# Patient Record
Sex: MALE | Race: WHITE | NOT HISPANIC OR LATINO | Employment: FULL TIME | URBAN - METROPOLITAN AREA
[De-identification: names, ages, dates, MRNs, and addresses within clinical notes are randomized per-mention and may not be internally consistent; named-entity substitution may affect disease eponyms.]

---

## 2020-12-22 ENCOUNTER — HOSPITAL ENCOUNTER (EMERGENCY)
Facility: OTHER | Age: 35
Discharge: HOME | End: 2020-12-22
Attending: EMERGENCY MEDICINE
Payer: MEDICAID

## 2020-12-22 VITALS
SYSTOLIC BLOOD PRESSURE: 112 MMHG | HEIGHT: 71 IN | RESPIRATION RATE: 16 BRPM | WEIGHT: 185 LBS | DIASTOLIC BLOOD PRESSURE: 77 MMHG | HEART RATE: 109 BPM | TEMPERATURE: 98 F | BODY MASS INDEX: 25.9 KG/M2 | OXYGEN SATURATION: 96 %

## 2020-12-22 DIAGNOSIS — F10.920 ALCOHOLIC INTOXICATION WITHOUT COMPLICATION: Primary | ICD-10-CM

## 2020-12-22 LAB — GLUCOSE SERPL-MCNC: 206 MG/DL (ref 70–110)

## 2020-12-22 PROCEDURE — 99282 EMERGENCY DEPT VISIT SF MDM: CPT

## 2020-12-22 PROCEDURE — 82962 GLUCOSE BLOOD TEST: CPT

## 2020-12-23 LAB — POCT GLUCOSE: 206 MG/DL (ref 70–110)

## 2020-12-23 NOTE — ED PROVIDER NOTES
"I, Daisy Marshall, scribed for, and in the presence of, Misty La MD. I performed the scribed service and the documentation accurately describes the services I performed. I attest to the accuracy of the note.     CHIEF COMPLAINT  Chief Complaint   Patient presents with    Alcohol Intoxication     pt came to the ed tonight s/p trying to break into the wrong hotel. pt admits to ETOH tonight. pt chose hospital over group home. no complaints       HPI  Sampson Cook is a 35 y.o. male who presents due to alcohol intoxication. Patient admits to drinking "a lot" of bourbon tonight. States [facility] brought him in because he was doing "stupid stuff." Patient reports taking daily medications, but states he does not remember the names of them. He admits to smoking cigarettes, denies use of illicit drugs. He denies any known allergies. He denies any complaints at this time. This is the extent of the patient's complaints at this time.       PAST MEDICAL HISTORY  No past medical history on file.    CURRENT MEDICATIONS    No current facility-administered medications for this encounter.   No current outpatient medications on file.    ALLERGIES    Review of patient's allergies indicates:  Not on File    SURGICAL HISTORY    No past surgical history on file.    SOCIAL HISTORY    Social History     Socioeconomic History    Marital status: Single     Spouse name: Not on file    Number of children: Not on file    Years of education: Not on file    Highest education level: Not on file   Occupational History    Not on file   Social Needs    Financial resource strain: Not on file    Food insecurity     Worry: Not on file     Inability: Not on file    Transportation needs     Medical: Not on file     Non-medical: Not on file   Tobacco Use    Smoking status: Not on file   Substance and Sexual Activity    Alcohol use: Not on file    Drug use: Not on file    Sexual activity: Not on file   Lifestyle    Physical activity     Days per " "week: Not on file     Minutes per session: Not on file    Stress: Not on file   Relationships    Social connections     Talks on phone: Not on file     Gets together: Not on file     Attends Muslim service: Not on file     Active member of club or organization: Not on file     Attends meetings of clubs or organizations: Not on file     Relationship status: Not on file   Other Topics Concern    Not on file   Social History Narrative    Not on file       FAMILY HISTORY    No family history on file.    REVIEW OF SYSTEMS   Constitutional:  No fever or weakness.   Eyes:  No redness, pain, or discharge.   HENT:  No ear pain, no sudden onset headache, no throat pain.  Respiratory:  No wheezing, cough, or shortness of breath.   Cardiovascular:  No chest pain or palpitations.  GI:  No abdominal pain, nausea, vomiting, or diarrhea.   : No dysuria or discharge.  Musculoskeletal:  No injury; full range of motion.   Skin:  No rash, abscess, or laceration.  Psychiatric: No suicidal ideations, homicidal ideations, auditory or visual hallucinations  Neurologic:  No focal weakness or sensory changes.   All Systems otherwise negative except as noted in the Review of Systems and History of Present Illness.    PHYSICAL EXAM    VITAL SIGNS: /77   Pulse 109   Temp 98 °F (36.7 °C)   Resp 16   Ht 5' 11" (1.803 m)   Wt 83.9 kg (185 lb)   SpO2 96%   BMI 25.80 kg/m²    Constitutional:  No acute distress.  Well developed, well nourished, alert & oriented x 3, non-toxic appearance. Appears clinically intoxication. Slurred speech.   HENT:  Normocephalic, atraumatic.  Normal ears, nose, and throat. Dry mucousa.  Eyes:  PERRL, EOMI, conjunctiva normal.  Neck: Normal range of motion, no tenderness, supple.  Respiratory:  Nonlabored breathing with normal breath sounds; no respiratory distress.  Cardiovascular: Tachycardic rate, regular rhythm.  GI:  Soft, nontender, no rebound or guarding.  Musculoskeletal: Normal ROM, no " tenderness, injury, edema.  Integument:  Warm, dry skin without infection. Bruises on bilateral legs that appear to be old. No deformity noted. Legs warm and dry.  Neurologic:  Normal motor, sensation with no focal deficit.  Psychiatric:  Affect normal, Judgment normal, Mood normal. No SI, HI and not gravely disabled.    LABS  Pertinent labs reviewed. (See chart for details)   Labs Reviewed   POCT GLUCOSE MONITORING CONTINUOUS         EKG    No results found for this or any previous visit.      RADIOLOGY    No orders to display       PROCEDURES    Procedures    Medications - No data to display    ED COURSE & MEDICAL DECISION MAKING      Pertinent & Imaging studies reviewed. (See chart for details)    Differential Diagnosis: Intoxication without evidence of trauma. Appears dry, otherwise well appearing. Will continue IV fluids, observe until clinically sober and then discharge.      ED Course as of Dec 22 2205   Tue Dec 22, 2020   2049 Patient asking for food, appears intoxicated, but appears sober enough to be eating. Giving IV fluids now and will PO challenge    [MG]      ED Course User Index  [MG] Misty La MD         There are no discharge medications for this patient.      There are no discharge medications for this patient.        OVERALL IMPRESSION:     34 yo M who was brought to the ER instead of to long-term, for intoxication and thought to be breaking into a hotel.  He has no signs of trauma he was observed if clinically in given fluids for being dry.  He was tolerating p.o. up and walking, and was discharged in stable condition    FINAL DIAGNOSIS  1. Alcoholic intoxication without complication        DISPOSITION  Patient discharged in stable condition.     I discussed with patient and/or family/caretaker that this evaluation in the ED does not suggest any emergent or life threatening condition medical condition requiring admission or immediate intervention beyond what was provided in the ED.   Regardless, an unremarkable evaluation in the ED does not preclude the development or presence of a serious or life threatening condition. As such, patient was instructed to return immediately for any worsening or change in current symptoms.       Critical care time spent with this patient (not including billable procedures) was 0 minutes.    Misty La MD  Emergency Medicine  [facility]  12/22/2020 7:57 PM    I have reviewed the notes, assessments, and/or procedures performed by Daisy vázquez and agree with documentation of this patient    Please pardon typos or dictation errors, as this note was transcribed using [employer] direct dictation software.           Misty La MD  12/22/20 1029

## 2020-12-23 NOTE — ED NOTES
Pt presents to the ED via ems for intoxication. Pt admits to drinking alcohol tonight, and then tried to break in to a hotel, pt chose to come to the hospital rather than go to residential. Pt currently having a conversation with himself. GER

## 2021-12-08 ENCOUNTER — HOSPITAL ENCOUNTER (INPATIENT)
Facility: HOSPITAL | Age: 36
LOS: 4 days | Discharge: LEFT BEFORE DISCHARGE | DRG: 894 | End: 2021-12-12
Attending: EMERGENCY MEDICINE | Admitting: STUDENT IN AN ORGANIZED HEALTH CARE EDUCATION/TRAINING PROGRAM
Payer: MEDICAID

## 2021-12-08 DIAGNOSIS — Z91.199 MEDICAL NON-COMPLIANCE: ICD-10-CM

## 2021-12-08 DIAGNOSIS — F10.939 ALCOHOL WITHDRAWAL SYNDROME WITH COMPLICATION: ICD-10-CM

## 2021-12-08 DIAGNOSIS — R74.8 ELEVATED CPK: ICD-10-CM

## 2021-12-08 DIAGNOSIS — R07.9 CHEST PAIN AT REST: ICD-10-CM

## 2021-12-08 DIAGNOSIS — Z72.0 TOBACCO USE: ICD-10-CM

## 2021-12-08 DIAGNOSIS — R00.0 TACHYCARDIA: ICD-10-CM

## 2021-12-08 DIAGNOSIS — F10.20 ALCOHOL USE DISORDER, SEVERE, DEPENDENCE: Chronic | ICD-10-CM

## 2021-12-08 DIAGNOSIS — R07.9 CHEST PAIN: ICD-10-CM

## 2021-12-08 DIAGNOSIS — F10.939: ICD-10-CM

## 2021-12-08 DIAGNOSIS — F10.229 ALCOHOL DEPENDENCE WITH INTOXICATION WITH COMPLICATION: Primary | ICD-10-CM

## 2021-12-08 DIAGNOSIS — R07.9 CHEST PAIN, RULE OUT ACUTE MYOCARDIAL INFARCTION: ICD-10-CM

## 2021-12-08 DIAGNOSIS — E11.65 TYPE 2 DIABETES MELLITUS WITH HYPERGLYCEMIA, WITHOUT LONG-TERM CURRENT USE OF INSULIN: ICD-10-CM

## 2021-12-08 DIAGNOSIS — R79.89 TROPONIN I ABOVE REFERENCE RANGE: ICD-10-CM

## 2021-12-08 LAB
ALBUMIN SERPL BCP-MCNC: 3.7 G/DL (ref 3.5–5.2)
ALP SERPL-CCNC: 53 U/L (ref 55–135)
ALT SERPL W/O P-5'-P-CCNC: 31 U/L (ref 10–44)
AMPHET+METHAMPHET UR QL: NEGATIVE
ANION GAP SERPL CALC-SCNC: 20 MMOL/L (ref 8–16)
APAP SERPL-MCNC: <3 UG/ML (ref 10–20)
AST SERPL-CCNC: 42 U/L (ref 10–40)
B-OH-BUTYR BLD STRIP-SCNC: 0.5 MMOL/L (ref 0–0.5)
BACTERIA #/AREA URNS AUTO: NORMAL /HPF
BARBITURATES UR QL SCN>200 NG/ML: NEGATIVE
BASOPHILS # BLD AUTO: 0.13 K/UL (ref 0–0.2)
BASOPHILS NFR BLD: 2.5 % (ref 0–1.9)
BENZODIAZ UR QL SCN>200 NG/ML: NEGATIVE
BILIRUB SERPL-MCNC: 0.4 MG/DL (ref 0.1–1)
BILIRUB UR QL STRIP: NEGATIVE
BUN SERPL-MCNC: 14 MG/DL (ref 6–20)
BUN SERPL-MCNC: 15 MG/DL (ref 6–30)
BZE UR QL SCN: NEGATIVE
CALCIUM SERPL-MCNC: 9.1 MG/DL (ref 8.7–10.5)
CANNABINOIDS UR QL SCN: NEGATIVE
CHLORIDE SERPL-SCNC: 100 MMOL/L (ref 95–110)
CHLORIDE SERPL-SCNC: 104 MMOL/L (ref 95–110)
CK SERPL-CCNC: 391 U/L (ref 20–200)
CLARITY UR REFRACT.AUTO: CLEAR
CO2 SERPL-SCNC: 17 MMOL/L (ref 23–29)
COLOR UR AUTO: YELLOW
CREAT SERPL-MCNC: 0.9 MG/DL (ref 0.5–1.4)
CREAT SERPL-MCNC: 1.1 MG/DL (ref 0.5–1.4)
CREAT UR-MCNC: 93 MG/DL (ref 23–375)
CTP QC/QA: YES
DIFFERENTIAL METHOD: ABNORMAL
EOSINOPHIL # BLD AUTO: 0.1 K/UL (ref 0–0.5)
EOSINOPHIL NFR BLD: 2.1 % (ref 0–8)
ERYTHROCYTE [DISTWIDTH] IN BLOOD BY AUTOMATED COUNT: 13 % (ref 11.5–14.5)
EST. GFR  (AFRICAN AMERICAN): >60 ML/MIN/1.73 M^2
EST. GFR  (NON AFRICAN AMERICAN): >60 ML/MIN/1.73 M^2
ESTIMATED AVG GLUCOSE: 143 MG/DL (ref 68–131)
ETHANOL SERPL-MCNC: 303 MG/DL
GLUCOSE SERPL-MCNC: 277 MG/DL (ref 70–110)
GLUCOSE SERPL-MCNC: 278 MG/DL (ref 70–110)
GLUCOSE UR QL STRIP: ABNORMAL
HBA1C MFR BLD: 6.6 % (ref 4–5.6)
HCT VFR BLD AUTO: 47.6 % (ref 40–54)
HCT VFR BLD CALC: 50 %PCV (ref 36–54)
HGB BLD-MCNC: 16 G/DL (ref 14–18)
HGB UR QL STRIP: ABNORMAL
IMM GRANULOCYTES # BLD AUTO: 0.01 K/UL (ref 0–0.04)
IMM GRANULOCYTES NFR BLD AUTO: 0.2 % (ref 0–0.5)
KETONES UR QL STRIP: ABNORMAL
LEUKOCYTE ESTERASE UR QL STRIP: NEGATIVE
LIPASE SERPL-CCNC: 59 U/L (ref 4–60)
LYMPHOCYTES # BLD AUTO: 1.8 K/UL (ref 1–4.8)
LYMPHOCYTES NFR BLD: 35 % (ref 18–48)
MAGNESIUM SERPL-MCNC: 1.9 MG/DL (ref 1.6–2.6)
MCH RBC QN AUTO: 29.7 PG (ref 27–31)
MCHC RBC AUTO-ENTMCNC: 33.6 G/DL (ref 32–36)
MCV RBC AUTO: 89 FL (ref 82–98)
METHADONE UR QL SCN>300 NG/ML: NEGATIVE
MICROSCOPIC COMMENT: NORMAL
MONOCYTES # BLD AUTO: 0.4 K/UL (ref 0.3–1)
MONOCYTES NFR BLD: 7.6 % (ref 4–15)
NEUTROPHILS # BLD AUTO: 2.8 K/UL (ref 1.8–7.7)
NEUTROPHILS NFR BLD: 52.6 % (ref 38–73)
NITRITE UR QL STRIP: NEGATIVE
NRBC BLD-RTO: 0 /100 WBC
OPIATES UR QL SCN: NEGATIVE
PCP UR QL SCN>25 NG/ML: NEGATIVE
PH UR STRIP: 5 [PH] (ref 5–8)
PLATELET # BLD AUTO: 347 K/UL (ref 150–450)
PMV BLD AUTO: 9.9 FL (ref 9.2–12.9)
POC IONIZED CALCIUM: 0.98 MMOL/L (ref 1.06–1.42)
POC TCO2 (MEASURED): 22 MMOL/L (ref 23–29)
POTASSIUM BLD-SCNC: 4 MMOL/L (ref 3.5–5.1)
POTASSIUM SERPL-SCNC: 4.1 MMOL/L (ref 3.5–5.1)
PROT SERPL-MCNC: 7.2 G/DL (ref 6–8.4)
PROT UR QL STRIP: NEGATIVE
RBC # BLD AUTO: 5.38 M/UL (ref 4.6–6.2)
RBC #/AREA URNS AUTO: 2 /HPF (ref 0–4)
SAMPLE: ABNORMAL
SARS-COV-2 RDRP RESP QL NAA+PROBE: NEGATIVE
SODIUM BLD-SCNC: 139 MMOL/L (ref 136–145)
SODIUM SERPL-SCNC: 137 MMOL/L (ref 136–145)
SP GR UR STRIP: 1.02 (ref 1–1.03)
SQUAMOUS #/AREA URNS AUTO: 2 /HPF
TOXICOLOGY INFORMATION: NORMAL
TSH SERPL DL<=0.005 MIU/L-ACNC: 0.47 UIU/ML (ref 0.4–4)
URN SPEC COLLECT METH UR: ABNORMAL
WBC # BLD AUTO: 5.23 K/UL (ref 3.9–12.7)
WBC #/AREA URNS AUTO: 1 /HPF (ref 0–5)
YEAST UR QL AUTO: NORMAL

## 2021-12-08 PROCEDURE — 82550 ASSAY OF CK (CPK): CPT | Performed by: PHYSICIAN ASSISTANT

## 2021-12-08 PROCEDURE — 80053 COMPREHEN METABOLIC PANEL: CPT | Performed by: PHYSICIAN ASSISTANT

## 2021-12-08 PROCEDURE — 25000003 PHARM REV CODE 250: Performed by: PHYSICIAN ASSISTANT

## 2021-12-08 PROCEDURE — 93005 ELECTROCARDIOGRAM TRACING: CPT

## 2021-12-08 PROCEDURE — G0378 HOSPITAL OBSERVATION PER HR: HCPCS

## 2021-12-08 PROCEDURE — 99285 EMERGENCY DEPT VISIT HI MDM: CPT | Mod: 25

## 2021-12-08 PROCEDURE — 80143 DRUG ASSAY ACETAMINOPHEN: CPT | Performed by: PHYSICIAN ASSISTANT

## 2021-12-08 PROCEDURE — 93010 EKG 12-LEAD: ICD-10-PCS | Mod: ,,, | Performed by: INTERNAL MEDICINE

## 2021-12-08 PROCEDURE — 86803 HEPATITIS C AB TEST: CPT | Performed by: EMERGENCY MEDICINE

## 2021-12-08 PROCEDURE — 99285 EMERGENCY DEPT VISIT HI MDM: CPT | Mod: 25,CS,, | Performed by: PHYSICIAN ASSISTANT

## 2021-12-08 PROCEDURE — 99406 PR TOBACCO USE CESSATION INTERMEDIATE 3-10 MINUTES: ICD-10-PCS | Mod: ,,, | Performed by: PHYSICIAN ASSISTANT

## 2021-12-08 PROCEDURE — 87389 HIV-1 AG W/HIV-1&-2 AB AG IA: CPT | Performed by: EMERGENCY MEDICINE

## 2021-12-08 PROCEDURE — 12000002 HC ACUTE/MED SURGE SEMI-PRIVATE ROOM

## 2021-12-08 PROCEDURE — 99220 PR INITIAL OBSERVATION CARE,LEVL III: CPT | Mod: ,,, | Performed by: PHYSICIAN ASSISTANT

## 2021-12-08 PROCEDURE — U0002 COVID-19 LAB TEST NON-CDC: HCPCS | Performed by: PHYSICIAN ASSISTANT

## 2021-12-08 PROCEDURE — 83690 ASSAY OF LIPASE: CPT | Performed by: PHYSICIAN ASSISTANT

## 2021-12-08 PROCEDURE — 93010 ELECTROCARDIOGRAM REPORT: CPT | Mod: ,,, | Performed by: INTERNAL MEDICINE

## 2021-12-08 PROCEDURE — 99285 PR EMERGENCY DEPT VISIT,LEVEL V: ICD-10-PCS | Mod: 25,CS,, | Performed by: PHYSICIAN ASSISTANT

## 2021-12-08 PROCEDURE — 96360 HYDRATION IV INFUSION INIT: CPT

## 2021-12-08 PROCEDURE — S4991 NICOTINE PATCH NONLEGEND: HCPCS | Performed by: PHYSICIAN ASSISTANT

## 2021-12-08 PROCEDURE — 84443 ASSAY THYROID STIM HORMONE: CPT | Performed by: PHYSICIAN ASSISTANT

## 2021-12-08 PROCEDURE — 63600175 PHARM REV CODE 636 W HCPCS: Performed by: PHYSICIAN ASSISTANT

## 2021-12-08 PROCEDURE — 80307 DRUG TEST PRSMV CHEM ANLYZR: CPT | Performed by: PHYSICIAN ASSISTANT

## 2021-12-08 PROCEDURE — 81001 URINALYSIS AUTO W/SCOPE: CPT | Performed by: PHYSICIAN ASSISTANT

## 2021-12-08 PROCEDURE — 99220 PR INITIAL OBSERVATION CARE,LEVL III: ICD-10-PCS | Mod: ,,, | Performed by: PHYSICIAN ASSISTANT

## 2021-12-08 PROCEDURE — 82010 KETONE BODYS QUAN: CPT | Performed by: PHYSICIAN ASSISTANT

## 2021-12-08 PROCEDURE — 82077 ASSAY SPEC XCP UR&BREATH IA: CPT | Performed by: PHYSICIAN ASSISTANT

## 2021-12-08 PROCEDURE — 83036 HEMOGLOBIN GLYCOSYLATED A1C: CPT | Performed by: PHYSICIAN ASSISTANT

## 2021-12-08 PROCEDURE — 80047 BASIC METABLC PNL IONIZED CA: CPT

## 2021-12-08 PROCEDURE — 99406 BEHAV CHNG SMOKING 3-10 MIN: CPT | Mod: ,,, | Performed by: PHYSICIAN ASSISTANT

## 2021-12-08 PROCEDURE — 83735 ASSAY OF MAGNESIUM: CPT | Performed by: PHYSICIAN ASSISTANT

## 2021-12-08 PROCEDURE — 85025 COMPLETE CBC W/AUTO DIFF WBC: CPT | Performed by: PHYSICIAN ASSISTANT

## 2021-12-08 RX ORDER — IBUPROFEN 200 MG
1 TABLET ORAL
Status: DISCONTINUED | OUTPATIENT
Start: 2021-12-08 | End: 2021-12-08

## 2021-12-08 RX ORDER — IPRATROPIUM BROMIDE AND ALBUTEROL SULFATE 2.5; .5 MG/3ML; MG/3ML
3 SOLUTION RESPIRATORY (INHALATION) EVERY 4 HOURS PRN
Status: DISCONTINUED | OUTPATIENT
Start: 2021-12-08 | End: 2021-12-12 | Stop reason: HOSPADM

## 2021-12-08 RX ORDER — IBUPROFEN 200 MG
16 TABLET ORAL
Status: DISCONTINUED | OUTPATIENT
Start: 2021-12-08 | End: 2021-12-12 | Stop reason: HOSPADM

## 2021-12-08 RX ORDER — PROMETHAZINE HYDROCHLORIDE 25 MG/1
25 TABLET ORAL EVERY 6 HOURS PRN
Status: DISCONTINUED | OUTPATIENT
Start: 2021-12-08 | End: 2021-12-12 | Stop reason: HOSPADM

## 2021-12-08 RX ORDER — NITROGLYCERIN 0.4 MG/1
0.4 TABLET SUBLINGUAL EVERY 5 MIN PRN
Status: DISCONTINUED | OUTPATIENT
Start: 2021-12-09 | End: 2021-12-12 | Stop reason: HOSPADM

## 2021-12-08 RX ORDER — FOLIC ACID 1 MG/1
1000 TABLET ORAL DAILY
COMMUNITY
Start: 2021-10-30

## 2021-12-08 RX ORDER — B-COMPLEX WITH VITAMIN C
1 TABLET ORAL
Status: COMPLETED | OUTPATIENT
Start: 2021-12-08 | End: 2021-12-08

## 2021-12-08 RX ORDER — IBUPROFEN 200 MG
1 TABLET ORAL DAILY PRN
Status: DISCONTINUED | OUTPATIENT
Start: 2021-12-08 | End: 2021-12-08

## 2021-12-08 RX ORDER — METFORMIN HYDROCHLORIDE 500 MG/1
1000 TABLET ORAL 2 TIMES DAILY
COMMUNITY

## 2021-12-08 RX ORDER — ONDANSETRON 4 MG/1
4 TABLET, ORALLY DISINTEGRATING ORAL EVERY 6 HOURS PRN
Status: DISCONTINUED | OUTPATIENT
Start: 2021-12-08 | End: 2021-12-12 | Stop reason: HOSPADM

## 2021-12-08 RX ORDER — FOLIC ACID 1 MG/1
1 TABLET ORAL DAILY
Status: DISCONTINUED | OUTPATIENT
Start: 2021-12-09 | End: 2021-12-12 | Stop reason: HOSPADM

## 2021-12-08 RX ORDER — BISACODYL 10 MG
10 SUPPOSITORY, RECTAL RECTAL DAILY PRN
Status: DISCONTINUED | OUTPATIENT
Start: 2021-12-08 | End: 2021-12-12 | Stop reason: HOSPADM

## 2021-12-08 RX ORDER — IBUPROFEN 200 MG
24 TABLET ORAL
Status: DISCONTINUED | OUTPATIENT
Start: 2021-12-08 | End: 2021-12-12 | Stop reason: HOSPADM

## 2021-12-08 RX ORDER — ESCITALOPRAM OXALATE 10 MG/1
10 TABLET ORAL DAILY
COMMUNITY
Start: 2021-07-29 | End: 2022-02-04

## 2021-12-08 RX ORDER — INSULIN ASPART 100 [IU]/ML
0-5 INJECTION, SOLUTION INTRAVENOUS; SUBCUTANEOUS
Status: DISCONTINUED | OUTPATIENT
Start: 2021-12-08 | End: 2021-12-12 | Stop reason: HOSPADM

## 2021-12-08 RX ORDER — POLYETHYLENE GLYCOL 3350 17 G/17G
17 POWDER, FOR SOLUTION ORAL DAILY PRN
Status: DISCONTINUED | OUTPATIENT
Start: 2021-12-08 | End: 2021-12-12 | Stop reason: HOSPADM

## 2021-12-08 RX ORDER — ACETAMINOPHEN 325 MG/1
650 TABLET ORAL EVERY 4 HOURS PRN
Status: DISCONTINUED | OUTPATIENT
Start: 2021-12-08 | End: 2021-12-12 | Stop reason: HOSPADM

## 2021-12-08 RX ORDER — DIAZEPAM 5 MG/1
10 TABLET ORAL EVERY 8 HOURS
Status: DISCONTINUED | OUTPATIENT
Start: 2021-12-08 | End: 2021-12-09

## 2021-12-08 RX ORDER — THIAMINE HCL 100 MG
100 TABLET ORAL
Status: COMPLETED | OUTPATIENT
Start: 2021-12-08 | End: 2021-12-08

## 2021-12-08 RX ORDER — TALC
6 POWDER (GRAM) TOPICAL NIGHTLY PRN
Status: DISCONTINUED | OUTPATIENT
Start: 2021-12-08 | End: 2021-12-12 | Stop reason: HOSPADM

## 2021-12-08 RX ORDER — FLUTICASONE PROPIONATE 50 MCG
SPRAY, SUSPENSION (ML) NASAL
COMMUNITY
Start: 2021-10-07 | End: 2022-02-04

## 2021-12-08 RX ORDER — LORAZEPAM 1 MG/1
2 TABLET ORAL EVERY 4 HOURS PRN
Status: DISCONTINUED | OUTPATIENT
Start: 2021-12-08 | End: 2021-12-12 | Stop reason: HOSPADM

## 2021-12-08 RX ORDER — GLUCAGON 1 MG
1 KIT INJECTION
Status: DISCONTINUED | OUTPATIENT
Start: 2021-12-08 | End: 2021-12-12 | Stop reason: HOSPADM

## 2021-12-08 RX ORDER — LORAZEPAM 0.5 MG/1
0.5 TABLET ORAL
Status: COMPLETED | OUTPATIENT
Start: 2021-12-08 | End: 2021-12-08

## 2021-12-08 RX ORDER — SODIUM CHLORIDE 0.9 % (FLUSH) 0.9 %
10 SYRINGE (ML) INJECTION
Status: DISCONTINUED | OUTPATIENT
Start: 2021-12-08 | End: 2021-12-12 | Stop reason: HOSPADM

## 2021-12-08 RX ORDER — THIAMINE HCL 100 MG
100 TABLET ORAL DAILY
Status: DISCONTINUED | OUTPATIENT
Start: 2021-12-09 | End: 2021-12-12 | Stop reason: HOSPADM

## 2021-12-08 RX ORDER — CETIRIZINE HYDROCHLORIDE 10 MG/1
10 TABLET ORAL DAILY
COMMUNITY
Start: 2021-10-07 | End: 2022-02-04

## 2021-12-08 RX ORDER — FOLIC ACID 1 MG/1
1 TABLET ORAL
Status: COMPLETED | OUTPATIENT
Start: 2021-12-08 | End: 2021-12-08

## 2021-12-08 RX ORDER — THIAMINE HCL 50 MG
50 TABLET ORAL DAILY
COMMUNITY
Start: 2021-10-30 | End: 2022-10-30

## 2021-12-08 RX ADMIN — LORAZEPAM 0.5 MG: 0.5 TABLET ORAL at 10:12

## 2021-12-08 RX ADMIN — FOLIC ACID 1 MG: 1 TABLET ORAL at 10:12

## 2021-12-08 RX ADMIN — ACETAMINOPHEN 650 MG: 325 TABLET ORAL at 11:12

## 2021-12-08 RX ADMIN — Medication 1 TABLET: at 11:12

## 2021-12-08 RX ADMIN — NICOTINE 1 PATCH: 21 PATCH, EXTENDED RELEASE TRANSDERMAL at 06:12

## 2021-12-08 RX ADMIN — Medication 100 MG: at 06:12

## 2021-12-08 RX ADMIN — SODIUM CHLORIDE, SODIUM LACTATE, POTASSIUM CHLORIDE, AND CALCIUM CHLORIDE 1000 ML: .6; .31; .03; .02 INJECTION, SOLUTION INTRAVENOUS at 10:12

## 2021-12-08 RX ADMIN — SODIUM CHLORIDE 1000 ML: 0.9 INJECTION, SOLUTION INTRAVENOUS at 06:12

## 2021-12-08 RX ADMIN — NITROGLYCERIN 0.4 MG: 0.4 TABLET, ORALLY DISINTEGRATING SUBLINGUAL at 11:12

## 2021-12-08 RX ADMIN — DIAZEPAM 10 MG: 5 TABLET ORAL at 10:12

## 2021-12-09 PROBLEM — F10.939 ALCOHOL WITHDRAWAL SYNDROME WITH COMPLICATION: Status: ACTIVE | Noted: 2021-12-09

## 2021-12-09 PROBLEM — R07.9 CHEST PAIN, RULE OUT ACUTE MYOCARDIAL INFARCTION: Status: ACTIVE | Noted: 2021-12-09

## 2021-12-09 PROBLEM — F10.20 ALCOHOL USE DISORDER, SEVERE, DEPENDENCE: Chronic | Status: ACTIVE | Noted: 2021-12-08

## 2021-12-09 LAB
ALBUMIN SERPL BCP-MCNC: 3.5 G/DL (ref 3.5–5.2)
ALP SERPL-CCNC: 48 U/L (ref 55–135)
ALT SERPL W/O P-5'-P-CCNC: 27 U/L (ref 10–44)
ANION GAP SERPL CALC-SCNC: 13 MMOL/L (ref 8–16)
ASCENDING AORTA: 3.63 CM
AST SERPL-CCNC: 30 U/L (ref 10–40)
AV INDEX (PROSTH): 0.87
AV MEAN GRADIENT: 6 MMHG
AV PEAK GRADIENT: 11 MMHG
AV VALVE AREA: 3.02 CM2
AV VELOCITY RATIO: 0.84
BASOPHILS # BLD AUTO: 0.08 K/UL (ref 0–0.2)
BASOPHILS NFR BLD: 1.2 % (ref 0–1.9)
BILIRUB SERPL-MCNC: 1.5 MG/DL (ref 0.1–1)
BSA FOR ECHO PROCEDURE: 2.13 M2
BUN SERPL-MCNC: 10 MG/DL (ref 6–20)
CALCIUM SERPL-MCNC: 9.3 MG/DL (ref 8.7–10.5)
CHLORIDE SERPL-SCNC: 104 MMOL/L (ref 95–110)
CHOLEST SERPL-MCNC: 292 MG/DL (ref 120–199)
CHOLEST/HDLC SERPL: 4.2 {RATIO} (ref 2–5)
CO2 SERPL-SCNC: 23 MMOL/L (ref 23–29)
CREAT SERPL-MCNC: 0.7 MG/DL (ref 0.5–1.4)
CV ECHO LV RWT: 0.41 CM
DIFFERENTIAL METHOD: NORMAL
DOP CALC AO PEAK VEL: 1.64 M/S
DOP CALC AO VTI: 27.87 CM
DOP CALC LVOT AREA: 3.5 CM2
DOP CALC LVOT DIAMETER: 2.11 CM
DOP CALC LVOT PEAK VEL: 1.37 M/S
DOP CALC LVOT STROKE VOLUME: 84.3 CM3
DOP CALCLVOT PEAK VEL VTI: 24.12 CM
E WAVE DECELERATION TIME: 262.67 MSEC
E/A RATIO: 0.78
E/E' RATIO: 7.11 M/S
ECHO LV POSTERIOR WALL: 1.09 CM (ref 0.6–1.1)
EJECTION FRACTION: 60 %
EOSINOPHIL # BLD AUTO: 0.3 K/UL (ref 0–0.5)
EOSINOPHIL NFR BLD: 4.1 % (ref 0–8)
ERYTHROCYTE [DISTWIDTH] IN BLOOD BY AUTOMATED COUNT: 12.9 % (ref 11.5–14.5)
EST. GFR  (AFRICAN AMERICAN): >60 ML/MIN/1.73 M^2
EST. GFR  (NON AFRICAN AMERICAN): >60 ML/MIN/1.73 M^2
ESTIMATED AVG GLUCOSE: 146 MG/DL (ref 68–131)
FOLATE SERPL-MCNC: 18.4 NG/ML (ref 4–24)
FRACTIONAL SHORTENING: 40 % (ref 28–44)
GLUCOSE SERPL-MCNC: 101 MG/DL (ref 70–110)
HBA1C MFR BLD: 6.7 % (ref 4–5.6)
HCT VFR BLD AUTO: 44.3 % (ref 40–54)
HCV AB SERPL QL IA: NEGATIVE
HDLC SERPL-MCNC: 69 MG/DL (ref 40–75)
HDLC SERPL: 23.6 % (ref 20–50)
HGB BLD-MCNC: 14.9 G/DL (ref 14–18)
IMM GRANULOCYTES # BLD AUTO: 0.02 K/UL (ref 0–0.04)
IMM GRANULOCYTES NFR BLD AUTO: 0.3 % (ref 0–0.5)
INR PPP: 0.9 (ref 0.8–1.2)
INTERVENTRICULAR SEPTUM: 0.85 CM (ref 0.6–1.1)
LA MAJOR: 6.61 CM
LA MINOR: 6.36 CM
LA WIDTH: 3.52 CM
LDLC SERPL CALC-MCNC: 181.8 MG/DL (ref 63–159)
LEFT ATRIUM SIZE: 3.35 CM
LEFT ATRIUM VOLUME INDEX MOD: 25.9 ML/M2
LEFT ATRIUM VOLUME INDEX: 30.8 ML/M2
LEFT ATRIUM VOLUME MOD: 54.68 CM3
LEFT ATRIUM VOLUME: 64.98 CM3
LEFT INTERNAL DIMENSION IN SYSTOLE: 3.23 CM (ref 2.1–4)
LEFT VENTRICLE DIASTOLIC VOLUME INDEX: 65.66 ML/M2
LEFT VENTRICLE DIASTOLIC VOLUME: 138.54 ML
LEFT VENTRICLE MASS INDEX: 93 G/M2
LEFT VENTRICLE SYSTOLIC VOLUME INDEX: 19.8 ML/M2
LEFT VENTRICLE SYSTOLIC VOLUME: 41.86 ML
LEFT VENTRICULAR INTERNAL DIMENSION IN DIASTOLE: 5.35 CM (ref 3.5–6)
LEFT VENTRICULAR MASS: 195.53 G
LV LATERAL E/E' RATIO: 6.4 M/S
LV SEPTAL E/E' RATIO: 8 M/S
LYMPHOCYTES # BLD AUTO: 2.3 K/UL (ref 1–4.8)
LYMPHOCYTES NFR BLD: 35.4 % (ref 18–48)
MAGNESIUM SERPL-MCNC: 1.9 MG/DL (ref 1.6–2.6)
MCH RBC QN AUTO: 29.6 PG (ref 27–31)
MCHC RBC AUTO-ENTMCNC: 33.6 G/DL (ref 32–36)
MCV RBC AUTO: 88 FL (ref 82–98)
MONOCYTES # BLD AUTO: 0.6 K/UL (ref 0.3–1)
MONOCYTES NFR BLD: 9.3 % (ref 4–15)
MV PEAK A VEL: 0.82 M/S
MV PEAK E VEL: 0.64 M/S
MV STENOSIS PRESSURE HALF TIME: 76.18 MS
MV VALVE AREA P 1/2 METHOD: 2.89 CM2
NEUTROPHILS # BLD AUTO: 3.3 K/UL (ref 1.8–7.7)
NEUTROPHILS NFR BLD: 49.7 % (ref 38–73)
NONHDLC SERPL-MCNC: 223 MG/DL
NRBC BLD-RTO: 0 /100 WBC
PHOSPHATE SERPL-MCNC: 3.7 MG/DL (ref 2.7–4.5)
PLATELET # BLD AUTO: 265 K/UL (ref 150–450)
PMV BLD AUTO: 10 FL (ref 9.2–12.9)
POCT GLUCOSE: 119 MG/DL (ref 70–110)
POCT GLUCOSE: 129 MG/DL (ref 70–110)
POCT GLUCOSE: 168 MG/DL (ref 70–110)
POTASSIUM SERPL-SCNC: 3.8 MMOL/L (ref 3.5–5.1)
PROCALCITONIN SERPL IA-MCNC: 0.02 NG/ML
PROT SERPL-MCNC: 6.5 G/DL (ref 6–8.4)
PROTHROMBIN TIME: 10.4 SEC (ref 9–12.5)
PULM VEIN S/D RATIO: 1.79
PV PEAK D VEL: 0.43 M/S
PV PEAK S VEL: 0.77 M/S
RA MAJOR: 5.46 CM
RA PRESSURE: 3 MMHG
RA WIDTH: 3.43 CM
RBC # BLD AUTO: 5.04 M/UL (ref 4.6–6.2)
RIGHT VENTRICULAR END-DIASTOLIC DIMENSION: 2.39 CM
RV TISSUE DOPPLER FREE WALL SYSTOLIC VELOCITY 1 (APICAL 4 CHAMBER VIEW): 15.46 CM/S
SINUS: 3.59 CM
SODIUM SERPL-SCNC: 140 MMOL/L (ref 136–145)
STJ: 3.21 CM
TDI LATERAL: 0.1 M/S
TDI SEPTAL: 0.08 M/S
TDI: 0.09 M/S
TRICUSPID ANNULAR PLANE SYSTOLIC EXCURSION: 1.49 CM
TRIGL SERPL-MCNC: 206 MG/DL (ref 30–150)
TROPONIN I SERPL DL<=0.01 NG/ML-MCNC: 0.08 NG/ML (ref 0–0.03)
TROPONIN I SERPL DL<=0.01 NG/ML-MCNC: 0.08 NG/ML (ref 0–0.03)
TROPONIN I SERPL DL<=0.01 NG/ML-MCNC: 0.09 NG/ML (ref 0–0.03)
VIT B12 SERPL-MCNC: 516 PG/ML (ref 210–950)
WBC # BLD AUTO: 6.59 K/UL (ref 3.9–12.7)

## 2021-12-09 PROCEDURE — 84484 ASSAY OF TROPONIN QUANT: CPT | Performed by: PHYSICIAN ASSISTANT

## 2021-12-09 PROCEDURE — 83036 HEMOGLOBIN GLYCOSYLATED A1C: CPT | Performed by: PHYSICIAN ASSISTANT

## 2021-12-09 PROCEDURE — 25000242 PHARM REV CODE 250 ALT 637 W/ HCPCS: Performed by: PHYSICIAN ASSISTANT

## 2021-12-09 PROCEDURE — 93010 ELECTROCARDIOGRAM REPORT: CPT | Mod: ,,, | Performed by: INTERNAL MEDICINE

## 2021-12-09 PROCEDURE — 82746 ASSAY OF FOLIC ACID SERUM: CPT | Performed by: PHYSICIAN ASSISTANT

## 2021-12-09 PROCEDURE — 94760 N-INVAS EAR/PLS OXIMETRY 1: CPT

## 2021-12-09 PROCEDURE — 80053 COMPREHEN METABOLIC PANEL: CPT | Performed by: PHYSICIAN ASSISTANT

## 2021-12-09 PROCEDURE — 63600175 PHARM REV CODE 636 W HCPCS: Performed by: INTERNAL MEDICINE

## 2021-12-09 PROCEDURE — 85025 COMPLETE CBC W/AUTO DIFF WBC: CPT | Performed by: PHYSICIAN ASSISTANT

## 2021-12-09 PROCEDURE — 84100 ASSAY OF PHOSPHORUS: CPT | Performed by: PHYSICIAN ASSISTANT

## 2021-12-09 PROCEDURE — 93005 ELECTROCARDIOGRAM TRACING: CPT

## 2021-12-09 PROCEDURE — 99223 1ST HOSP IP/OBS HIGH 75: CPT | Mod: AF,HB,, | Performed by: PSYCHIATRY & NEUROLOGY

## 2021-12-09 PROCEDURE — 99223 PR INITIAL HOSPITAL CARE,LEVL III: ICD-10-PCS | Mod: AF,HB,, | Performed by: PSYCHIATRY & NEUROLOGY

## 2021-12-09 PROCEDURE — 85610 PROTHROMBIN TIME: CPT | Performed by: PHYSICIAN ASSISTANT

## 2021-12-09 PROCEDURE — 25000003 PHARM REV CODE 250: Performed by: PHYSICIAN ASSISTANT

## 2021-12-09 PROCEDURE — 93010 EKG 12-LEAD: ICD-10-PCS | Mod: ,,, | Performed by: INTERNAL MEDICINE

## 2021-12-09 PROCEDURE — 84145 PROCALCITONIN (PCT): CPT | Performed by: PHYSICIAN ASSISTANT

## 2021-12-09 PROCEDURE — 63600175 PHARM REV CODE 636 W HCPCS: Performed by: PHYSICIAN ASSISTANT

## 2021-12-09 PROCEDURE — 80061 LIPID PANEL: CPT | Performed by: PHYSICIAN ASSISTANT

## 2021-12-09 PROCEDURE — 36415 COLL VENOUS BLD VENIPUNCTURE: CPT | Performed by: PHYSICIAN ASSISTANT

## 2021-12-09 PROCEDURE — 99226 PR SUBSEQUENT OBSERVATION CARE,LEVEL III: CPT | Mod: ,,, | Performed by: STUDENT IN AN ORGANIZED HEALTH CARE EDUCATION/TRAINING PROGRAM

## 2021-12-09 PROCEDURE — 11000001 HC ACUTE MED/SURG PRIVATE ROOM

## 2021-12-09 PROCEDURE — 99226 PR SUBSEQUENT OBSERVATION CARE,LEVEL III: ICD-10-PCS | Mod: ,,, | Performed by: STUDENT IN AN ORGANIZED HEALTH CARE EDUCATION/TRAINING PROGRAM

## 2021-12-09 PROCEDURE — 84425 ASSAY OF VITAMIN B-1: CPT | Performed by: PHYSICIAN ASSISTANT

## 2021-12-09 PROCEDURE — 84484 ASSAY OF TROPONIN QUANT: CPT | Mod: 91 | Performed by: PHYSICIAN ASSISTANT

## 2021-12-09 PROCEDURE — 82607 VITAMIN B-12: CPT | Performed by: PHYSICIAN ASSISTANT

## 2021-12-09 PROCEDURE — 36415 COLL VENOUS BLD VENIPUNCTURE: CPT | Performed by: STUDENT IN AN ORGANIZED HEALTH CARE EDUCATION/TRAINING PROGRAM

## 2021-12-09 PROCEDURE — 83735 ASSAY OF MAGNESIUM: CPT | Performed by: PHYSICIAN ASSISTANT

## 2021-12-09 PROCEDURE — 25000003 PHARM REV CODE 250: Performed by: STUDENT IN AN ORGANIZED HEALTH CARE EDUCATION/TRAINING PROGRAM

## 2021-12-09 PROCEDURE — 84484 ASSAY OF TROPONIN QUANT: CPT | Mod: 91 | Performed by: STUDENT IN AN ORGANIZED HEALTH CARE EDUCATION/TRAINING PROGRAM

## 2021-12-09 RX ORDER — CLOPIDOGREL BISULFATE 75 MG/1
75 TABLET ORAL DAILY
Status: DISCONTINUED | OUTPATIENT
Start: 2021-12-09 | End: 2021-12-09

## 2021-12-09 RX ORDER — LORAZEPAM 2 MG/ML
2 INJECTION INTRAMUSCULAR EVERY 4 HOURS PRN
Status: DISCONTINUED | OUTPATIENT
Start: 2021-12-09 | End: 2021-12-12 | Stop reason: HOSPADM

## 2021-12-09 RX ORDER — ASPIRIN 325 MG
325 TABLET ORAL ONCE
Status: COMPLETED | OUTPATIENT
Start: 2021-12-09 | End: 2021-12-09

## 2021-12-09 RX ORDER — ASPIRIN 81 MG/1
81 TABLET ORAL DAILY
Status: DISCONTINUED | OUTPATIENT
Start: 2021-12-10 | End: 2021-12-12 | Stop reason: HOSPADM

## 2021-12-09 RX ORDER — ASPIRIN 325 MG
325 TABLET ORAL DAILY
Status: DISCONTINUED | OUTPATIENT
Start: 2021-12-09 | End: 2021-12-09

## 2021-12-09 RX ORDER — SODIUM CHLORIDE, SODIUM LACTATE, POTASSIUM CHLORIDE, CALCIUM CHLORIDE 600; 310; 30; 20 MG/100ML; MG/100ML; MG/100ML; MG/100ML
INJECTION, SOLUTION INTRAVENOUS CONTINUOUS
Status: ACTIVE | OUTPATIENT
Start: 2021-12-09 | End: 2021-12-09

## 2021-12-09 RX ORDER — DIAZEPAM 5 MG/1
10 TABLET ORAL EVERY 6 HOURS
Status: DISCONTINUED | OUTPATIENT
Start: 2021-12-09 | End: 2021-12-12 | Stop reason: HOSPADM

## 2021-12-09 RX ORDER — ATORVASTATIN CALCIUM 20 MG/1
40 TABLET, FILM COATED ORAL DAILY
Status: DISCONTINUED | OUTPATIENT
Start: 2021-12-09 | End: 2021-12-12 | Stop reason: HOSPADM

## 2021-12-09 RX ADMIN — DIAZEPAM 10 MG: 5 TABLET ORAL at 08:12

## 2021-12-09 RX ADMIN — FOLIC ACID 1 MG: 1 TABLET ORAL at 09:12

## 2021-12-09 RX ADMIN — DIAZEPAM 10 MG: 5 TABLET ORAL at 01:12

## 2021-12-09 RX ADMIN — ACETAMINOPHEN 650 MG: 325 TABLET ORAL at 11:12

## 2021-12-09 RX ADMIN — MELATONIN TAB 3 MG 6 MG: 3 TAB at 09:12

## 2021-12-09 RX ADMIN — MULTIPLE VITAMINS W/ MINERALS TAB 1 TABLET: TAB at 09:12

## 2021-12-09 RX ADMIN — LORAZEPAM 2 MG: 1 TABLET ORAL at 09:12

## 2021-12-09 RX ADMIN — MELATONIN TAB 3 MG 6 MG: 3 TAB at 01:12

## 2021-12-09 RX ADMIN — LORAZEPAM 2 MG: 1 TABLET ORAL at 01:12

## 2021-12-09 RX ADMIN — Medication 100 MG: at 09:12

## 2021-12-09 RX ADMIN — ATORVASTATIN CALCIUM 40 MG: 20 TABLET, FILM COATED ORAL at 01:12

## 2021-12-09 RX ADMIN — LORAZEPAM 2 MG: 2 INJECTION INTRAMUSCULAR; INTRAVENOUS at 11:12

## 2021-12-09 RX ADMIN — LORAZEPAM 2 MG: 2 INJECTION INTRAMUSCULAR; INTRAVENOUS at 03:12

## 2021-12-09 RX ADMIN — SODIUM CHLORIDE, SODIUM LACTATE, POTASSIUM CHLORIDE, AND CALCIUM CHLORIDE: .6; .31; .03; .02 INJECTION, SOLUTION INTRAVENOUS at 01:12

## 2021-12-09 RX ADMIN — ASPIRIN 325 MG ORAL TABLET 325 MG: 325 PILL ORAL at 01:12

## 2021-12-09 RX ADMIN — DIAZEPAM 10 MG: 5 TABLET ORAL at 06:12

## 2021-12-10 LAB
ALBUMIN SERPL BCP-MCNC: 3.5 G/DL (ref 3.5–5.2)
ALP SERPL-CCNC: 49 U/L (ref 55–135)
ALT SERPL W/O P-5'-P-CCNC: 25 U/L (ref 10–44)
ANION GAP SERPL CALC-SCNC: 14 MMOL/L (ref 8–16)
APTT BLDCRRT: 26 SEC (ref 21–32)
AST SERPL-CCNC: 28 U/L (ref 10–40)
BASOPHILS # BLD AUTO: 0.06 K/UL (ref 0–0.2)
BASOPHILS # BLD AUTO: 0.06 K/UL (ref 0–0.2)
BASOPHILS NFR BLD: 0.8 % (ref 0–1.9)
BASOPHILS NFR BLD: 0.9 % (ref 0–1.9)
BILIRUB SERPL-MCNC: 1 MG/DL (ref 0.1–1)
BUN SERPL-MCNC: 10 MG/DL (ref 6–20)
CALCIUM SERPL-MCNC: 9.3 MG/DL (ref 8.7–10.5)
CHLORIDE SERPL-SCNC: 102 MMOL/L (ref 95–110)
CO2 SERPL-SCNC: 20 MMOL/L (ref 23–29)
CREAT SERPL-MCNC: 0.7 MG/DL (ref 0.5–1.4)
DIFFERENTIAL METHOD: ABNORMAL
DIFFERENTIAL METHOD: NORMAL
EOSINOPHIL # BLD AUTO: 0.5 K/UL (ref 0–0.5)
EOSINOPHIL # BLD AUTO: 0.6 K/UL (ref 0–0.5)
EOSINOPHIL NFR BLD: 7.5 % (ref 0–8)
EOSINOPHIL NFR BLD: 7.7 % (ref 0–8)
ERYTHROCYTE [DISTWIDTH] IN BLOOD BY AUTOMATED COUNT: 12.2 % (ref 11.5–14.5)
ERYTHROCYTE [DISTWIDTH] IN BLOOD BY AUTOMATED COUNT: 12.3 % (ref 11.5–14.5)
EST. GFR  (AFRICAN AMERICAN): >60 ML/MIN/1.73 M^2
EST. GFR  (NON AFRICAN AMERICAN): >60 ML/MIN/1.73 M^2
GLUCOSE SERPL-MCNC: 100 MG/DL (ref 70–110)
HCT VFR BLD AUTO: 45.5 % (ref 40–54)
HCT VFR BLD AUTO: 47.1 % (ref 40–54)
HGB BLD-MCNC: 15.6 G/DL (ref 14–18)
HGB BLD-MCNC: 15.9 G/DL (ref 14–18)
IMM GRANULOCYTES # BLD AUTO: 0.01 K/UL (ref 0–0.04)
IMM GRANULOCYTES # BLD AUTO: 0.02 K/UL (ref 0–0.04)
IMM GRANULOCYTES NFR BLD AUTO: 0.2 % (ref 0–0.5)
IMM GRANULOCYTES NFR BLD AUTO: 0.3 % (ref 0–0.5)
INR PPP: 0.9 (ref 0.8–1.2)
LYMPHOCYTES # BLD AUTO: 1.6 K/UL (ref 1–4.8)
LYMPHOCYTES # BLD AUTO: 1.7 K/UL (ref 1–4.8)
LYMPHOCYTES NFR BLD: 20.5 % (ref 18–48)
LYMPHOCYTES NFR BLD: 25.7 % (ref 18–48)
MAGNESIUM SERPL-MCNC: 2.1 MG/DL (ref 1.6–2.6)
MCH RBC QN AUTO: 29.6 PG (ref 27–31)
MCH RBC QN AUTO: 29.6 PG (ref 27–31)
MCHC RBC AUTO-ENTMCNC: 33.8 G/DL (ref 32–36)
MCHC RBC AUTO-ENTMCNC: 34.3 G/DL (ref 32–36)
MCV RBC AUTO: 86 FL (ref 82–98)
MCV RBC AUTO: 88 FL (ref 82–98)
MONOCYTES # BLD AUTO: 0.6 K/UL (ref 0.3–1)
MONOCYTES # BLD AUTO: 0.7 K/UL (ref 0.3–1)
MONOCYTES NFR BLD: 10.3 % (ref 4–15)
MONOCYTES NFR BLD: 8.2 % (ref 4–15)
NEUTROPHILS # BLD AUTO: 3.6 K/UL (ref 1.8–7.7)
NEUTROPHILS # BLD AUTO: 4.9 K/UL (ref 1.8–7.7)
NEUTROPHILS NFR BLD: 55.4 % (ref 38–73)
NEUTROPHILS NFR BLD: 62.5 % (ref 38–73)
NRBC BLD-RTO: 0 /100 WBC
NRBC BLD-RTO: 0 /100 WBC
PHOSPHATE SERPL-MCNC: 3.5 MG/DL (ref 2.7–4.5)
PLATELET # BLD AUTO: 243 K/UL (ref 150–450)
PLATELET # BLD AUTO: 264 K/UL (ref 150–450)
PMV BLD AUTO: 10.6 FL (ref 9.2–12.9)
PMV BLD AUTO: 9.8 FL (ref 9.2–12.9)
POCT GLUCOSE: 117 MG/DL (ref 70–110)
POCT GLUCOSE: 129 MG/DL (ref 70–110)
POTASSIUM SERPL-SCNC: 3.6 MMOL/L (ref 3.5–5.1)
PROT SERPL-MCNC: 6.8 G/DL (ref 6–8.4)
PROTHROMBIN TIME: 10.4 SEC (ref 9–12.5)
RBC # BLD AUTO: 5.27 M/UL (ref 4.6–6.2)
RBC # BLD AUTO: 5.38 M/UL (ref 4.6–6.2)
SODIUM SERPL-SCNC: 136 MMOL/L (ref 136–145)
TROPONIN I SERPL DL<=0.01 NG/ML-MCNC: 0.09 NG/ML (ref 0–0.03)
TROPONIN I SERPL DL<=0.01 NG/ML-MCNC: 0.09 NG/ML (ref 0–0.03)
WBC # BLD AUTO: 6.51 K/UL (ref 3.9–12.7)
WBC # BLD AUTO: 7.76 K/UL (ref 3.9–12.7)

## 2021-12-10 PROCEDURE — 36415 COLL VENOUS BLD VENIPUNCTURE: CPT | Performed by: PHYSICIAN ASSISTANT

## 2021-12-10 PROCEDURE — 93010 ELECTROCARDIOGRAM REPORT: CPT | Mod: ,,, | Performed by: INTERNAL MEDICINE

## 2021-12-10 PROCEDURE — 85730 THROMBOPLASTIN TIME PARTIAL: CPT | Performed by: STUDENT IN AN ORGANIZED HEALTH CARE EDUCATION/TRAINING PROGRAM

## 2021-12-10 PROCEDURE — 93010 EKG 12-LEAD: ICD-10-PCS | Mod: ,,, | Performed by: INTERNAL MEDICINE

## 2021-12-10 PROCEDURE — 85025 COMPLETE CBC W/AUTO DIFF WBC: CPT | Mod: 91 | Performed by: STUDENT IN AN ORGANIZED HEALTH CARE EDUCATION/TRAINING PROGRAM

## 2021-12-10 PROCEDURE — 25000003 PHARM REV CODE 250: Performed by: PHYSICIAN ASSISTANT

## 2021-12-10 PROCEDURE — 93005 ELECTROCARDIOGRAM TRACING: CPT

## 2021-12-10 PROCEDURE — 25000242 PHARM REV CODE 250 ALT 637 W/ HCPCS: Performed by: PHYSICIAN ASSISTANT

## 2021-12-10 PROCEDURE — 99232 SBSQ HOSP IP/OBS MODERATE 35: CPT | Mod: AF,HB,, | Performed by: PSYCHIATRY & NEUROLOGY

## 2021-12-10 PROCEDURE — 85025 COMPLETE CBC W/AUTO DIFF WBC: CPT | Performed by: PHYSICIAN ASSISTANT

## 2021-12-10 PROCEDURE — 99226 PR SUBSEQUENT OBSERVATION CARE,LEVEL III: CPT | Mod: ,,, | Performed by: STUDENT IN AN ORGANIZED HEALTH CARE EDUCATION/TRAINING PROGRAM

## 2021-12-10 PROCEDURE — 99226 PR SUBSEQUENT OBSERVATION CARE,LEVEL III: ICD-10-PCS | Mod: ,,, | Performed by: STUDENT IN AN ORGANIZED HEALTH CARE EDUCATION/TRAINING PROGRAM

## 2021-12-10 PROCEDURE — 36415 COLL VENOUS BLD VENIPUNCTURE: CPT | Performed by: STUDENT IN AN ORGANIZED HEALTH CARE EDUCATION/TRAINING PROGRAM

## 2021-12-10 PROCEDURE — 80053 COMPREHEN METABOLIC PANEL: CPT | Performed by: PHYSICIAN ASSISTANT

## 2021-12-10 PROCEDURE — 11000001 HC ACUTE MED/SURG PRIVATE ROOM

## 2021-12-10 PROCEDURE — 85610 PROTHROMBIN TIME: CPT | Performed by: STUDENT IN AN ORGANIZED HEALTH CARE EDUCATION/TRAINING PROGRAM

## 2021-12-10 PROCEDURE — 63600175 PHARM REV CODE 636 W HCPCS: Performed by: INTERNAL MEDICINE

## 2021-12-10 PROCEDURE — 84484 ASSAY OF TROPONIN QUANT: CPT | Mod: 91 | Performed by: STUDENT IN AN ORGANIZED HEALTH CARE EDUCATION/TRAINING PROGRAM

## 2021-12-10 PROCEDURE — 83735 ASSAY OF MAGNESIUM: CPT | Performed by: PHYSICIAN ASSISTANT

## 2021-12-10 PROCEDURE — 94760 N-INVAS EAR/PLS OXIMETRY 1: CPT

## 2021-12-10 PROCEDURE — 25000003 PHARM REV CODE 250: Performed by: STUDENT IN AN ORGANIZED HEALTH CARE EDUCATION/TRAINING PROGRAM

## 2021-12-10 PROCEDURE — 84100 ASSAY OF PHOSPHORUS: CPT | Performed by: PHYSICIAN ASSISTANT

## 2021-12-10 PROCEDURE — 99232 PR SUBSEQUENT HOSPITAL CARE,LEVL II: ICD-10-PCS | Mod: AF,HB,, | Performed by: PSYCHIATRY & NEUROLOGY

## 2021-12-10 RX ORDER — CLOPIDOGREL 300 MG/1
600 TABLET, FILM COATED ORAL ONCE
Status: DISCONTINUED | OUTPATIENT
Start: 2021-12-10 | End: 2021-12-10

## 2021-12-10 RX ORDER — CLOPIDOGREL BISULFATE 75 MG/1
75 TABLET ORAL DAILY
Status: DISCONTINUED | OUTPATIENT
Start: 2021-12-11 | End: 2021-12-10

## 2021-12-10 RX ORDER — HEPARIN SODIUM,PORCINE/D5W 25000/250
0-40 INTRAVENOUS SOLUTION INTRAVENOUS CONTINUOUS
Status: DISCONTINUED | OUTPATIENT
Start: 2021-12-10 | End: 2021-12-10

## 2021-12-10 RX ORDER — HYDRALAZINE HYDROCHLORIDE 25 MG/1
25 TABLET, FILM COATED ORAL EVERY 8 HOURS
Status: DISCONTINUED | OUTPATIENT
Start: 2021-12-10 | End: 2021-12-12 | Stop reason: HOSPADM

## 2021-12-10 RX ORDER — NITROGLYCERIN 0.4 MG/1
0.4 TABLET SUBLINGUAL EVERY 5 MIN PRN
Status: CANCELLED | OUTPATIENT
Start: 2021-12-10

## 2021-12-10 RX ADMIN — ASPIRIN 81 MG: 81 TABLET, COATED ORAL at 08:12

## 2021-12-10 RX ADMIN — MULTIPLE VITAMINS W/ MINERALS TAB 1 TABLET: TAB at 08:12

## 2021-12-10 RX ADMIN — LORAZEPAM 2 MG: 1 TABLET ORAL at 08:12

## 2021-12-10 RX ADMIN — HYDRALAZINE HYDROCHLORIDE 25 MG: 25 TABLET ORAL at 09:12

## 2021-12-10 RX ADMIN — HYDRALAZINE HYDROCHLORIDE 25 MG: 25 TABLET ORAL at 01:12

## 2021-12-10 RX ADMIN — LORAZEPAM 2 MG: 1 TABLET ORAL at 03:12

## 2021-12-10 RX ADMIN — DIAZEPAM 10 MG: 5 TABLET ORAL at 11:12

## 2021-12-10 RX ADMIN — Medication 100 MG: at 08:12

## 2021-12-10 RX ADMIN — FOLIC ACID 1 MG: 1 TABLET ORAL at 08:12

## 2021-12-10 RX ADMIN — ATORVASTATIN CALCIUM 40 MG: 20 TABLET, FILM COATED ORAL at 08:12

## 2021-12-10 RX ADMIN — DIAZEPAM 10 MG: 5 TABLET ORAL at 01:12

## 2021-12-10 RX ADMIN — DIAZEPAM 10 MG: 5 TABLET ORAL at 05:12

## 2021-12-10 RX ADMIN — LORAZEPAM 2 MG: 2 INJECTION INTRAMUSCULAR; INTRAVENOUS at 11:12

## 2021-12-10 RX ADMIN — NITROGLYCERIN 0.4 MG: 0.4 TABLET, ORALLY DISINTEGRATING SUBLINGUAL at 08:12

## 2021-12-10 RX ADMIN — DIAZEPAM 10 MG: 5 TABLET ORAL at 06:12

## 2021-12-10 RX ADMIN — LORAZEPAM 2 MG: 1 TABLET ORAL at 04:12

## 2021-12-11 LAB
ALBUMIN SERPL BCP-MCNC: 3.6 G/DL (ref 3.5–5.2)
ALP SERPL-CCNC: 47 U/L (ref 55–135)
ALT SERPL W/O P-5'-P-CCNC: 35 U/L (ref 10–44)
ANION GAP SERPL CALC-SCNC: 11 MMOL/L (ref 8–16)
AST SERPL-CCNC: 39 U/L (ref 10–40)
BILIRUB SERPL-MCNC: 0.6 MG/DL (ref 0.1–1)
BUN SERPL-MCNC: 15 MG/DL (ref 6–20)
CALCIUM SERPL-MCNC: 9.2 MG/DL (ref 8.7–10.5)
CHLORIDE SERPL-SCNC: 101 MMOL/L (ref 95–110)
CO2 SERPL-SCNC: 20 MMOL/L (ref 23–29)
CREAT SERPL-MCNC: 0.8 MG/DL (ref 0.5–1.4)
EST. GFR  (AFRICAN AMERICAN): >60 ML/MIN/1.73 M^2
EST. GFR  (NON AFRICAN AMERICAN): >60 ML/MIN/1.73 M^2
GLUCOSE SERPL-MCNC: 307 MG/DL (ref 70–110)
POCT GLUCOSE: 135 MG/DL (ref 70–110)
POCT GLUCOSE: 147 MG/DL (ref 70–110)
POCT GLUCOSE: 154 MG/DL (ref 70–110)
POCT GLUCOSE: 196 MG/DL (ref 70–110)
POTASSIUM SERPL-SCNC: 3.9 MMOL/L (ref 3.5–5.1)
PROT SERPL-MCNC: 6.4 G/DL (ref 6–8.4)
SODIUM SERPL-SCNC: 132 MMOL/L (ref 136–145)

## 2021-12-11 PROCEDURE — 94760 N-INVAS EAR/PLS OXIMETRY 1: CPT

## 2021-12-11 PROCEDURE — 25000003 PHARM REV CODE 250: Performed by: STUDENT IN AN ORGANIZED HEALTH CARE EDUCATION/TRAINING PROGRAM

## 2021-12-11 PROCEDURE — 80053 COMPREHEN METABOLIC PANEL: CPT | Performed by: PHYSICIAN ASSISTANT

## 2021-12-11 PROCEDURE — 63600175 PHARM REV CODE 636 W HCPCS: Performed by: INTERNAL MEDICINE

## 2021-12-11 PROCEDURE — 99225 PR SUBSEQUENT OBSERVATION CARE,LEVEL II: CPT | Mod: ,,, | Performed by: STUDENT IN AN ORGANIZED HEALTH CARE EDUCATION/TRAINING PROGRAM

## 2021-12-11 PROCEDURE — 11000001 HC ACUTE MED/SURG PRIVATE ROOM

## 2021-12-11 PROCEDURE — 36415 COLL VENOUS BLD VENIPUNCTURE: CPT | Performed by: PHYSICIAN ASSISTANT

## 2021-12-11 PROCEDURE — 25000003 PHARM REV CODE 250: Performed by: PHYSICIAN ASSISTANT

## 2021-12-11 PROCEDURE — 99225 PR SUBSEQUENT OBSERVATION CARE,LEVEL II: ICD-10-PCS | Mod: ,,, | Performed by: STUDENT IN AN ORGANIZED HEALTH CARE EDUCATION/TRAINING PROGRAM

## 2021-12-11 RX ADMIN — LORAZEPAM 2 MG: 1 TABLET ORAL at 06:12

## 2021-12-11 RX ADMIN — HYDRALAZINE HYDROCHLORIDE 25 MG: 25 TABLET ORAL at 05:12

## 2021-12-11 RX ADMIN — LORAZEPAM 2 MG: 2 INJECTION INTRAMUSCULAR; INTRAVENOUS at 08:12

## 2021-12-11 RX ADMIN — MULTIPLE VITAMINS W/ MINERALS TAB 1 TABLET: TAB at 08:12

## 2021-12-11 RX ADMIN — DIAZEPAM 10 MG: 5 TABLET ORAL at 05:12

## 2021-12-11 RX ADMIN — HYDRALAZINE HYDROCHLORIDE 25 MG: 25 TABLET ORAL at 02:12

## 2021-12-11 RX ADMIN — ATORVASTATIN CALCIUM 40 MG: 20 TABLET, FILM COATED ORAL at 08:12

## 2021-12-11 RX ADMIN — DIAZEPAM 10 MG: 5 TABLET ORAL at 11:12

## 2021-12-11 RX ADMIN — FOLIC ACID 1 MG: 1 TABLET ORAL at 08:12

## 2021-12-11 RX ADMIN — LORAZEPAM 2 MG: 1 TABLET ORAL at 01:12

## 2021-12-11 RX ADMIN — DIAZEPAM 10 MG: 5 TABLET ORAL at 10:12

## 2021-12-11 RX ADMIN — LORAZEPAM 2 MG: 1 TABLET ORAL at 10:12

## 2021-12-11 RX ADMIN — ASPIRIN 81 MG: 81 TABLET, COATED ORAL at 08:12

## 2021-12-11 RX ADMIN — Medication 100 MG: at 08:12

## 2021-12-11 RX ADMIN — HYDRALAZINE HYDROCHLORIDE 25 MG: 25 TABLET ORAL at 10:12

## 2021-12-12 VITALS
BODY MASS INDEX: 27.22 KG/M2 | HEART RATE: 92 BPM | HEIGHT: 71 IN | WEIGHT: 194.44 LBS | RESPIRATION RATE: 16 BRPM | SYSTOLIC BLOOD PRESSURE: 136 MMHG | DIASTOLIC BLOOD PRESSURE: 101 MMHG | OXYGEN SATURATION: 98 % | TEMPERATURE: 98 F

## 2021-12-12 LAB — POCT GLUCOSE: 157 MG/DL (ref 70–110)

## 2021-12-12 PROCEDURE — 99239 HOSP IP/OBS DSCHRG MGMT >30: CPT | Mod: 95,,, | Performed by: INTERNAL MEDICINE

## 2021-12-12 PROCEDURE — 99239 PR HOSPITAL DISCHARGE DAY,>30 MIN: ICD-10-PCS | Mod: 95,,, | Performed by: INTERNAL MEDICINE

## 2021-12-12 PROCEDURE — 25000003 PHARM REV CODE 250: Performed by: STUDENT IN AN ORGANIZED HEALTH CARE EDUCATION/TRAINING PROGRAM

## 2021-12-12 PROCEDURE — 25000003 PHARM REV CODE 250: Performed by: PHYSICIAN ASSISTANT

## 2021-12-12 RX ADMIN — HYDRALAZINE HYDROCHLORIDE 25 MG: 25 TABLET ORAL at 05:12

## 2021-12-12 RX ADMIN — DIAZEPAM 10 MG: 5 TABLET ORAL at 05:12

## 2021-12-12 RX ADMIN — MELATONIN TAB 3 MG 6 MG: 3 TAB at 12:12

## 2021-12-14 LAB — HIV 1+2 AB+HIV1 P24 AG SERPL QL IA: NEGATIVE

## 2021-12-15 LAB — VIT B1 BLD-MCNC: 100 UG/L (ref 38–122)

## 2022-02-03 ENCOUNTER — HOSPITAL ENCOUNTER (INPATIENT)
Facility: HOSPITAL | Age: 37
LOS: 4 days | Discharge: HOME | DRG: 897 | End: 2022-02-07
Attending: EMERGENCY MEDICINE | Admitting: INTERNAL MEDICINE
Payer: MEDICAID

## 2022-02-03 DIAGNOSIS — F10.932 ALCOHOL WITHDRAWAL SYNDROME WITH PERCEPTUAL DISTURBANCE: Primary | ICD-10-CM

## 2022-02-03 DIAGNOSIS — R07.9 CHEST PAIN: ICD-10-CM

## 2022-02-03 LAB
ALBUMIN SERPL BCP-MCNC: 3.8 G/DL (ref 3.5–5.2)
ALP SERPL-CCNC: 40 U/L (ref 55–135)
ALT SERPL W/O P-5'-P-CCNC: 36 U/L (ref 10–44)
AMPHET+METHAMPHET UR QL: NEGATIVE
ANION GAP SERPL CALC-SCNC: 14 MMOL/L (ref 8–16)
APAP SERPL-MCNC: <3 UG/ML (ref 10–20)
AST SERPL-CCNC: 39 U/L (ref 10–40)
BACTERIA #/AREA URNS AUTO: NORMAL /HPF
BARBITURATES UR QL SCN>200 NG/ML: NEGATIVE
BENZODIAZ UR QL SCN>200 NG/ML: NEGATIVE
BILIRUB SERPL-MCNC: 0.3 MG/DL (ref 0.1–1)
BILIRUB UR QL STRIP: NEGATIVE
BUN SERPL-MCNC: 10 MG/DL (ref 6–20)
BZE UR QL SCN: NEGATIVE
CALCIUM SERPL-MCNC: 8.9 MG/DL (ref 8.7–10.5)
CANNABINOIDS UR QL SCN: NEGATIVE
CHLORIDE SERPL-SCNC: 100 MMOL/L (ref 95–110)
CLARITY UR REFRACT.AUTO: CLEAR
CO2 SERPL-SCNC: 25 MMOL/L (ref 23–29)
COLOR UR AUTO: YELLOW
CREAT SERPL-MCNC: 0.8 MG/DL (ref 0.5–1.4)
CREAT UR-MCNC: 55 MG/DL (ref 23–375)
CTP QC/QA: YES
EST. GFR  (AFRICAN AMERICAN): >60 ML/MIN/1.73 M^2
EST. GFR  (NON AFRICAN AMERICAN): >60 ML/MIN/1.73 M^2
ETHANOL SERPL-MCNC: 263 MG/DL
GLUCOSE SERPL-MCNC: 283 MG/DL (ref 70–110)
GLUCOSE UR QL STRIP: ABNORMAL
HGB UR QL STRIP: NEGATIVE
KETONES UR QL STRIP: ABNORMAL
LEUKOCYTE ESTERASE UR QL STRIP: NEGATIVE
METHADONE UR QL SCN>300 NG/ML: NEGATIVE
MICROSCOPIC COMMENT: NORMAL
NITRITE UR QL STRIP: NEGATIVE
OPIATES UR QL SCN: NEGATIVE
PCP UR QL SCN>25 NG/ML: NEGATIVE
PH UR STRIP: 5 [PH] (ref 5–8)
POTASSIUM SERPL-SCNC: 3.8 MMOL/L (ref 3.5–5.1)
PROT SERPL-MCNC: 6.7 G/DL (ref 6–8.4)
PROT UR QL STRIP: NEGATIVE
RBC #/AREA URNS AUTO: 1 /HPF (ref 0–4)
SARS-COV-2 RDRP RESP QL NAA+PROBE: NEGATIVE
SODIUM SERPL-SCNC: 139 MMOL/L (ref 136–145)
SP GR UR STRIP: >=1.03 (ref 1–1.03)
SQUAMOUS #/AREA URNS AUTO: 1 /HPF
TOXICOLOGY INFORMATION: NORMAL
URN SPEC COLLECT METH UR: ABNORMAL
WBC #/AREA URNS AUTO: 1 /HPF (ref 0–5)
YEAST UR QL AUTO: NORMAL

## 2022-02-03 PROCEDURE — 99291 CRITICAL CARE FIRST HOUR: CPT | Mod: CS,,, | Performed by: EMERGENCY MEDICINE

## 2022-02-03 PROCEDURE — S4991 NICOTINE PATCH NONLEGEND: HCPCS

## 2022-02-03 PROCEDURE — 63600175 PHARM REV CODE 636 W HCPCS

## 2022-02-03 PROCEDURE — 81001 URINALYSIS AUTO W/SCOPE: CPT | Performed by: EMERGENCY MEDICINE

## 2022-02-03 PROCEDURE — U0002 COVID-19 LAB TEST NON-CDC: HCPCS | Performed by: EMERGENCY MEDICINE

## 2022-02-03 PROCEDURE — 25000003 PHARM REV CODE 250

## 2022-02-03 PROCEDURE — 80053 COMPREHEN METABOLIC PANEL: CPT | Performed by: EMERGENCY MEDICINE

## 2022-02-03 PROCEDURE — 96374 THER/PROPH/DIAG INJ IV PUSH: CPT

## 2022-02-03 PROCEDURE — 80143 DRUG ASSAY ACETAMINOPHEN: CPT | Performed by: EMERGENCY MEDICINE

## 2022-02-03 PROCEDURE — 12000002 HC ACUTE/MED SURGE SEMI-PRIVATE ROOM

## 2022-02-03 PROCEDURE — 99291 PR CRITICAL CARE, E/M 30-74 MINUTES: ICD-10-PCS | Mod: CS,,, | Performed by: EMERGENCY MEDICINE

## 2022-02-03 PROCEDURE — 82077 ASSAY SPEC XCP UR&BREATH IA: CPT | Performed by: EMERGENCY MEDICINE

## 2022-02-03 PROCEDURE — 99285 EMERGENCY DEPT VISIT HI MDM: CPT | Mod: 25

## 2022-02-03 PROCEDURE — 80307 DRUG TEST PRSMV CHEM ANLYZR: CPT | Performed by: EMERGENCY MEDICINE

## 2022-02-03 RX ORDER — DIAZEPAM 10 MG/2ML
10 INJECTION INTRAMUSCULAR
Status: COMPLETED | OUTPATIENT
Start: 2022-02-04 | End: 2022-02-04

## 2022-02-03 RX ORDER — FOLIC ACID 1 MG/1
1 TABLET ORAL ONCE
Status: COMPLETED | OUTPATIENT
Start: 2022-02-04 | End: 2022-02-04

## 2022-02-03 RX ORDER — DIAZEPAM 10 MG/2ML
10 INJECTION INTRAMUSCULAR
Status: COMPLETED | OUTPATIENT
Start: 2022-02-03 | End: 2022-02-03

## 2022-02-03 RX ORDER — IBUPROFEN 200 MG
1 TABLET ORAL
Status: COMPLETED | OUTPATIENT
Start: 2022-02-03 | End: 2022-02-04

## 2022-02-03 RX ORDER — THIAMINE HCL 100 MG
100 TABLET ORAL ONCE
Status: COMPLETED | OUTPATIENT
Start: 2022-02-04 | End: 2022-02-03

## 2022-02-03 RX ADMIN — DIAZEPAM 10 MG: 5 INJECTION, SOLUTION INTRAMUSCULAR; INTRAVENOUS at 10:02

## 2022-02-03 RX ADMIN — NICOTINE 1 PATCH: 21 PATCH TRANSDERMAL at 11:02

## 2022-02-03 RX ADMIN — Medication 100 MG: at 11:02

## 2022-02-03 NOTE — Clinical Note
Diagnosis: Alcohol withdrawal syndrome with perceptual disturbance [[id number]]   Future Attending Provider: JUNE GOMEZ [[id number]]   Admitting Provider:: JUNE GOMEZ [[id number]]

## 2022-02-04 PROBLEM — E11.65 TYPE 2 DIABETES MELLITUS WITH HYPERGLYCEMIA, WITHOUT LONG-TERM CURRENT USE OF INSULIN: Status: ACTIVE | Noted: 2022-02-04

## 2022-02-04 LAB
ALBUMIN SERPL BCP-MCNC: 3.6 G/DL (ref 3.5–5.2)
ALP SERPL-CCNC: 37 U/L (ref 55–135)
ALT SERPL W/O P-5'-P-CCNC: 34 U/L (ref 10–44)
ANION GAP SERPL CALC-SCNC: 8 MMOL/L (ref 8–16)
AST SERPL-CCNC: 37 U/L (ref 10–40)
BASOPHILS # BLD AUTO: 0.05 K/UL (ref 0–0.2)
BASOPHILS # BLD AUTO: 0.06 K/UL (ref 0–0.2)
BASOPHILS NFR BLD: 0.8 % (ref 0–1.9)
BASOPHILS NFR BLD: 1.1 % (ref 0–1.9)
BILIRUB SERPL-MCNC: 1.2 MG/DL (ref 0.1–1)
BUN SERPL-MCNC: 8 MG/DL (ref 6–20)
CALCIUM SERPL-MCNC: 8.9 MG/DL (ref 8.7–10.5)
CHLORIDE SERPL-SCNC: 101 MMOL/L (ref 95–110)
CO2 SERPL-SCNC: 27 MMOL/L (ref 23–29)
CREAT SERPL-MCNC: 0.6 MG/DL (ref 0.5–1.4)
DIFFERENTIAL METHOD: ABNORMAL
DIFFERENTIAL METHOD: ABNORMAL
EOSINOPHIL # BLD AUTO: 0.1 K/UL (ref 0–0.5)
EOSINOPHIL # BLD AUTO: 0.1 K/UL (ref 0–0.5)
EOSINOPHIL NFR BLD: 1.3 % (ref 0–8)
EOSINOPHIL NFR BLD: 2.4 % (ref 0–8)
ERYTHROCYTE [DISTWIDTH] IN BLOOD BY AUTOMATED COUNT: 12.9 % (ref 11.5–14.5)
ERYTHROCYTE [DISTWIDTH] IN BLOOD BY AUTOMATED COUNT: 16.8 % (ref 11.5–14.5)
EST. GFR  (AFRICAN AMERICAN): >60 ML/MIN/1.73 M^2
EST. GFR  (NON AFRICAN AMERICAN): >60 ML/MIN/1.73 M^2
ESTIMATED AVG GLUCOSE: 157 MG/DL (ref 68–131)
GLUCOSE SERPL-MCNC: 158 MG/DL (ref 70–110)
GLUCOSE SERPL-MCNC: 175 MG/DL (ref 70–110)
HBA1C MFR BLD: 7.1 % (ref 4–5.6)
HCT VFR BLD AUTO: 39.2 % (ref 40–54)
HCT VFR BLD AUTO: 41.4 % (ref 40–54)
HGB BLD-MCNC: 13.4 G/DL (ref 14–18)
HGB BLD-MCNC: 14 G/DL (ref 14–18)
IMM GRANULOCYTES # BLD AUTO: 0.01 K/UL (ref 0–0.04)
IMM GRANULOCYTES # BLD AUTO: 0.02 K/UL (ref 0–0.04)
IMM GRANULOCYTES NFR BLD AUTO: 0.2 % (ref 0–0.5)
IMM GRANULOCYTES NFR BLD AUTO: 0.3 % (ref 0–0.5)
LYMPHOCYTES # BLD AUTO: 1.5 K/UL (ref 1–4.8)
LYMPHOCYTES # BLD AUTO: 2.8 K/UL (ref 1–4.8)
LYMPHOCYTES NFR BLD: 23.6 % (ref 18–48)
LYMPHOCYTES NFR BLD: 50.5 % (ref 18–48)
MAGNESIUM SERPL-MCNC: 1.7 MG/DL (ref 1.6–2.6)
MCH RBC QN AUTO: 28.5 PG (ref 27–31)
MCH RBC QN AUTO: 29.2 PG (ref 27–31)
MCHC RBC AUTO-ENTMCNC: 33.8 G/DL (ref 32–36)
MCHC RBC AUTO-ENTMCNC: 34.2 G/DL (ref 32–36)
MCV RBC AUTO: 83 FL (ref 82–98)
MCV RBC AUTO: 86 FL (ref 82–98)
MONOCYTES # BLD AUTO: 0.4 K/UL (ref 0.3–1)
MONOCYTES # BLD AUTO: 0.5 K/UL (ref 0.3–1)
MONOCYTES NFR BLD: 7.1 % (ref 4–15)
MONOCYTES NFR BLD: 7.4 % (ref 4–15)
NEUTROPHILS # BLD AUTO: 2.1 K/UL (ref 1.8–7.7)
NEUTROPHILS # BLD AUTO: 4.3 K/UL (ref 1.8–7.7)
NEUTROPHILS NFR BLD: 38.4 % (ref 38–73)
NEUTROPHILS NFR BLD: 66.9 % (ref 38–73)
NRBC BLD-RTO: 0 /100 WBC
NRBC BLD-RTO: 0 /100 WBC
PHOSPHATE SERPL-MCNC: 3.1 MG/DL (ref 2.7–4.5)
PLATELET # BLD AUTO: 131 K/UL (ref 150–450)
PLATELET # BLD AUTO: 98 K/UL (ref 150–450)
PMV BLD AUTO: 10.4 FL (ref 9.2–12.9)
PMV BLD AUTO: 13.6 FL (ref 9.2–12.9)
POCT GLUCOSE: 141 MG/DL (ref 70–110)
POCT GLUCOSE: 163 MG/DL (ref 70–110)
POCT GLUCOSE: 175 MG/DL (ref 70–110)
POCT GLUCOSE: 259 MG/DL (ref 70–110)
POTASSIUM SERPL-SCNC: 3.7 MMOL/L (ref 3.5–5.1)
PROT SERPL-MCNC: 6.1 G/DL (ref 6–8.4)
RBC # BLD AUTO: 4.71 M/UL (ref 4.6–6.2)
RBC # BLD AUTO: 4.79 M/UL (ref 4.6–6.2)
SODIUM SERPL-SCNC: 136 MMOL/L (ref 136–145)
WBC # BLD AUTO: 5.51 K/UL (ref 3.9–12.7)
WBC # BLD AUTO: 6.36 K/UL (ref 3.9–12.7)

## 2022-02-04 PROCEDURE — 82962 GLUCOSE BLOOD TEST: CPT

## 2022-02-04 PROCEDURE — 85025 COMPLETE CBC W/AUTO DIFF WBC: CPT | Mod: 91 | Performed by: INTERNAL MEDICINE

## 2022-02-04 PROCEDURE — 25000003 PHARM REV CODE 250: Performed by: HOSPITALIST

## 2022-02-04 PROCEDURE — 63600175 PHARM REV CODE 636 W HCPCS

## 2022-02-04 PROCEDURE — 83735 ASSAY OF MAGNESIUM: CPT | Performed by: INTERNAL MEDICINE

## 2022-02-04 PROCEDURE — 25000003 PHARM REV CODE 250

## 2022-02-04 PROCEDURE — 11000001 HC ACUTE MED/SURG PRIVATE ROOM

## 2022-02-04 PROCEDURE — 96376 TX/PRO/DX INJ SAME DRUG ADON: CPT

## 2022-02-04 PROCEDURE — 83036 HEMOGLOBIN GLYCOSYLATED A1C: CPT | Performed by: INTERNAL MEDICINE

## 2022-02-04 PROCEDURE — 80053 COMPREHEN METABOLIC PANEL: CPT | Performed by: INTERNAL MEDICINE

## 2022-02-04 PROCEDURE — 84100 ASSAY OF PHOSPHORUS: CPT | Performed by: INTERNAL MEDICINE

## 2022-02-04 PROCEDURE — C9399 UNCLASSIFIED DRUGS OR BIOLOG: HCPCS | Performed by: HOSPITALIST

## 2022-02-04 PROCEDURE — 25000003 PHARM REV CODE 250: Performed by: INTERNAL MEDICINE

## 2022-02-04 PROCEDURE — 99220 PR INITIAL OBSERVATION CARE,LEVL III: ICD-10-PCS | Mod: ,,, | Performed by: HOSPITALIST

## 2022-02-04 PROCEDURE — 99220 PR INITIAL OBSERVATION CARE,LEVL III: CPT | Mod: ,,, | Performed by: HOSPITALIST

## 2022-02-04 PROCEDURE — 63600175 PHARM REV CODE 636 W HCPCS: Performed by: HOSPITALIST

## 2022-02-04 PROCEDURE — 96372 THER/PROPH/DIAG INJ SC/IM: CPT | Performed by: HOSPITALIST

## 2022-02-04 PROCEDURE — 85025 COMPLETE CBC W/AUTO DIFF WBC: CPT

## 2022-02-04 RX ORDER — CETIRIZINE HYDROCHLORIDE 10 MG/1
10 TABLET ORAL DAILY
Status: DISCONTINUED | OUTPATIENT
Start: 2022-02-04 | End: 2022-02-07 | Stop reason: HOSPADM

## 2022-02-04 RX ORDER — LORAZEPAM 2 MG/ML
1 INJECTION INTRAMUSCULAR EVERY 4 HOURS PRN
Status: DISCONTINUED | OUTPATIENT
Start: 2022-02-04 | End: 2022-02-07 | Stop reason: HOSPADM

## 2022-02-04 RX ORDER — SODIUM CHLORIDE 0.9 % (FLUSH) 0.9 %
10 SYRINGE (ML) INJECTION EVERY 12 HOURS PRN
Status: DISCONTINUED | OUTPATIENT
Start: 2022-02-04 | End: 2022-02-07 | Stop reason: HOSPADM

## 2022-02-04 RX ORDER — IBUPROFEN 200 MG
16 TABLET ORAL
Status: DISCONTINUED | OUTPATIENT
Start: 2022-02-04 | End: 2022-02-07 | Stop reason: HOSPADM

## 2022-02-04 RX ORDER — TALC
6 POWDER (GRAM) TOPICAL NIGHTLY PRN
Status: DISCONTINUED | OUTPATIENT
Start: 2022-02-04 | End: 2022-02-07 | Stop reason: HOSPADM

## 2022-02-04 RX ORDER — ESCITALOPRAM OXALATE 10 MG/1
10 TABLET ORAL DAILY
Status: DISCONTINUED | OUTPATIENT
Start: 2022-02-04 | End: 2022-02-07 | Stop reason: HOSPADM

## 2022-02-04 RX ORDER — NALOXONE HCL 0.4 MG/ML
0.02 VIAL (ML) INJECTION
Status: DISCONTINUED | OUTPATIENT
Start: 2022-02-04 | End: 2022-02-07 | Stop reason: HOSPADM

## 2022-02-04 RX ORDER — INSULIN ASPART 100 [IU]/ML
0-5 INJECTION, SOLUTION INTRAVENOUS; SUBCUTANEOUS
Status: DISCONTINUED | OUTPATIENT
Start: 2022-02-04 | End: 2022-02-07 | Stop reason: HOSPADM

## 2022-02-04 RX ORDER — ACETAMINOPHEN 325 MG/1
650 TABLET ORAL EVERY 6 HOURS PRN
Status: DISCONTINUED | OUTPATIENT
Start: 2022-02-04 | End: 2022-02-07 | Stop reason: HOSPADM

## 2022-02-04 RX ORDER — IBUPROFEN 200 MG
600 TABLET ORAL EVERY 6 HOURS PRN
COMMUNITY
End: 2022-03-28 | Stop reason: CLARIF

## 2022-02-04 RX ORDER — LORAZEPAM 2 MG/ML
1 INJECTION INTRAMUSCULAR EVERY 4 HOURS PRN
Status: DISCONTINUED | OUTPATIENT
Start: 2022-02-04 | End: 2022-02-04

## 2022-02-04 RX ORDER — GLUCAGON 1 MG
1 KIT INJECTION
Status: DISCONTINUED | OUTPATIENT
Start: 2022-02-04 | End: 2022-02-07 | Stop reason: HOSPADM

## 2022-02-04 RX ORDER — DIAZEPAM 5 MG/1
10 TABLET ORAL 3 TIMES DAILY
Status: DISCONTINUED | OUTPATIENT
Start: 2022-02-04 | End: 2022-02-06

## 2022-02-04 RX ORDER — LORAZEPAM 2 MG/ML
2 INJECTION INTRAMUSCULAR EVERY 4 HOURS PRN
Status: DISCONTINUED | OUTPATIENT
Start: 2022-02-04 | End: 2022-02-07 | Stop reason: HOSPADM

## 2022-02-04 RX ORDER — THIAMINE HCL 100 MG
100 TABLET ORAL DAILY
Status: DISCONTINUED | OUTPATIENT
Start: 2022-02-04 | End: 2022-02-07 | Stop reason: HOSPADM

## 2022-02-04 RX ORDER — ONDANSETRON 2 MG/ML
4 INJECTION INTRAMUSCULAR; INTRAVENOUS EVERY 8 HOURS PRN
Status: DISCONTINUED | OUTPATIENT
Start: 2022-02-04 | End: 2022-02-07 | Stop reason: HOSPADM

## 2022-02-04 RX ORDER — FOLIC ACID 1 MG/1
1 TABLET ORAL DAILY
Status: DISCONTINUED | OUTPATIENT
Start: 2022-02-04 | End: 2022-02-07 | Stop reason: HOSPADM

## 2022-02-04 RX ORDER — MAG HYDROX/ALUMINUM HYD/SIMETH 200-200-20
30 SUSPENSION, ORAL (FINAL DOSE FORM) ORAL 4 TIMES DAILY PRN
Status: DISCONTINUED | OUTPATIENT
Start: 2022-02-04 | End: 2022-02-07 | Stop reason: HOSPADM

## 2022-02-04 RX ORDER — FLUTICASONE PROPIONATE 50 MCG
1 SPRAY, SUSPENSION (ML) NASAL DAILY
Status: DISCONTINUED | OUTPATIENT
Start: 2022-02-04 | End: 2022-02-07 | Stop reason: HOSPADM

## 2022-02-04 RX ORDER — IBUPROFEN 200 MG
24 TABLET ORAL
Status: DISCONTINUED | OUTPATIENT
Start: 2022-02-04 | End: 2022-02-07 | Stop reason: HOSPADM

## 2022-02-04 RX ADMIN — ACETAMINOPHEN 650 MG: 325 TABLET ORAL at 03:02

## 2022-02-04 RX ADMIN — INSULIN DETEMIR 15 UNITS: 100 INJECTION, SOLUTION SUBCUTANEOUS at 09:02

## 2022-02-04 RX ADMIN — ESCITALOPRAM OXALATE 10 MG: 5 TABLET, FILM COATED ORAL at 09:02

## 2022-02-04 RX ADMIN — DIAZEPAM 10 MG: 10 INJECTION, SOLUTION INTRAMUSCULAR; INTRAVENOUS at 12:02

## 2022-02-04 RX ADMIN — INSULIN ASPART 3 UNITS: 100 INJECTION, SOLUTION INTRAVENOUS; SUBCUTANEOUS at 05:02

## 2022-02-04 RX ADMIN — Medication 6 MG: at 08:02

## 2022-02-04 RX ADMIN — ACETAMINOPHEN 650 MG: 325 TABLET ORAL at 08:02

## 2022-02-04 RX ADMIN — CETIRIZINE HYDROCHLORIDE 10 MG: 10 TABLET, FILM COATED ORAL at 09:02

## 2022-02-04 RX ADMIN — DIAZEPAM 10 MG: 5 TABLET ORAL at 09:02

## 2022-02-04 RX ADMIN — DIAZEPAM 10 MG: 5 TABLET ORAL at 03:02

## 2022-02-04 RX ADMIN — DIAZEPAM 10 MG: 5 TABLET ORAL at 08:02

## 2022-02-04 RX ADMIN — SODIUM CHLORIDE 1000 ML: 0.9 INJECTION, SOLUTION INTRAVENOUS at 03:02

## 2022-02-04 RX ADMIN — THERA TABS 1 TABLET: TAB at 03:02

## 2022-02-04 RX ADMIN — FOLIC ACID 1 MG: 1 TABLET ORAL at 09:02

## 2022-02-04 RX ADMIN — LORAZEPAM 1 MG: 2 INJECTION INTRAMUSCULAR; INTRAVENOUS at 07:02

## 2022-02-04 RX ADMIN — LORAZEPAM 1 MG: 2 INJECTION INTRAMUSCULAR; INTRAVENOUS at 03:02

## 2022-02-04 RX ADMIN — FOLIC ACID 1 MG: 1 TABLET ORAL at 12:02

## 2022-02-04 RX ADMIN — INSULIN DETEMIR 15 UNITS: 100 INJECTION, SOLUTION SUBCUTANEOUS at 03:02

## 2022-02-04 RX ADMIN — THERA TABS 1 TABLET: TAB at 12:02

## 2022-02-04 RX ADMIN — Medication 100 MG: at 09:02

## 2022-02-04 RX ADMIN — ONDANSETRON 4 MG: 2 INJECTION INTRAMUSCULAR; INTRAVENOUS at 03:02

## 2022-02-04 NOTE — ASSESSMENT & PLAN NOTE
-glucose elevated to 280s  -not in DKA  -last A1C 6.7 in December   -hold metformin   -NS 1L bolus for hyperglycemia   -will start on levemir 15 HS and LSSI   -adjust insulin based on CBG and PO intake

## 2022-02-04 NOTE — H&P
"[facility] - Emergency Dept  [facility] Medicine  History & Physical    Patient Name: Sampson Cook  MRN: [id number]  Patient Class: OP- Observation  Admission Date: 2/3/2022  Attending Physician: James Marmolejo MD   Primary Care Provider: LUZ Noyola         Patient information was obtained from patient and ER records.     Subjective:     Principal Problem:Alcohol withdrawal syndrome with complication    Chief Complaint:   Chief Complaint   Patient presents with    Alcohol Intoxication     Here for detox, +ETOH, last drink within the hours, has had "2 bottle and a couple beers" denies SI/ HI, also states paranoid hallucinating hearing voices, states "i'm very drunk"        HPI: Mr. Cook is a 36 year old male with PMHx of T2DM, tobabacco use, and alcohol abuse who presented to ED requesting alcohol detox. Patient states he has a long history of alcoholism and drinks about 2 fifth of hard liquor daily. He reports waking up in the morning needing to drink to avoid tremors. Patient reports his last drink about 3 pm yesterday when he decided to quit and seek detox. His roommate drove him to hospital. While in ED patient developed tremors and tachycardia consistent with his prior episodes of alcohol withdrawal. Patient notes he recently got fired from his job as  due to drinking problem and he is tired of drinking. He was admitted to hospital in December for withdrawal and eloped 2 days later due to anxiety per patient. He failed rehab in the past. Patient denies seizure, SI/HI. He endorses smoking cigarettes 1 PPD, but denies IVDU or other illicit drug use.     ED course: patient with tremors and tachycardic HR ~120. He received valium 10 mg IV x. 2.       Past Medical History:   Diagnosis Date    Alcoholism     Diabetes mellitus     Tobacco use        No past surgical history on file.    Review of patient's allergies indicates:  No Known Allergies    No current facility-administered " medications on file prior to encounter.     Current Outpatient Medications on File Prior to Encounter   Medication Sig    cetirizine (ZYRTEC) 10 MG tablet Take 10 mg by mouth once daily.    EScitalopram oxalate (LEXAPRO) 10 MG tablet Take 10 mg by mouth once daily.    fluticasone propionate (FLONASE) 50 mcg/actuation nasal spray by Each Nostril route.    folic acid (FOLVITE) 1 MG tablet Take 1,000 mcg by mouth once daily.    metformin HCl (METFORMIN ORAL) Take by mouth.    thiamine (VITAMIN B-1) 50 MG tablet Take 50 mg by mouth.     Family History    None       Tobacco Use    Smoking status: Current Every Day Smoker    Smokeless tobacco: Never Used   Substance and Sexual Activity    Alcohol use: Yes    Drug use: Not Currently    Sexual activity: Not on file     Review of Systems   Constitutional: Negative for activity change, appetite change, chills, diaphoresis, fatigue, fever and unexpected weight change.   HENT: Negative for congestion, dental problem, drooling, ear discharge, ear pain, facial swelling, hearing loss, mouth sores, nosebleeds, postnasal drip, rhinorrhea, sinus pressure, sneezing, sore throat, tinnitus, trouble swallowing and voice change.    Eyes: Negative for photophobia, pain, discharge, redness, itching and visual disturbance.   Respiratory: Negative for apnea, cough, choking, chest tightness, shortness of breath, wheezing and stridor.    Cardiovascular: Negative for chest pain, palpitations and leg swelling.   Gastrointestinal: Negative for abdominal distention, abdominal pain, anal bleeding, blood in stool, constipation, diarrhea, nausea, rectal pain and vomiting.   Endocrine: Negative for cold intolerance, heat intolerance, polydipsia, polyphagia and polyuria.   Genitourinary: Negative for decreased urine volume, difficulty urinating, dysuria, enuresis, flank pain, frequency, genital sores, hematuria, penile discharge, penile pain, penile swelling, scrotal swelling, testicular pain  and urgency.   Musculoskeletal: Negative for arthralgias, back pain, gait problem, joint swelling, myalgias, neck pain and neck stiffness.   Skin: Negative for color change, pallor, rash and wound.   Allergic/Immunologic: Negative for environmental allergies, food allergies and immunocompromised state.   Neurological: Positive for tremors. Negative for dizziness, seizures, syncope, facial asymmetry, speech difficulty, weakness, light-headedness, numbness and headaches.   Hematological: Negative for adenopathy. Does not bruise/bleed easily.   Psychiatric/Behavioral: Negative for agitation, behavioral problems, confusion, decreased concentration, dysphoric mood, hallucinations, self-injury, sleep disturbance and suicidal ideas. The patient is nervous/anxious. The patient is not hyperactive.      Objective:     Vital Signs (Most Recent):  Temp: 98.3 °F (36.8 °C) (02/03/22 1831)  Pulse: (!) 112 (02/03/22 2143)  Resp: 18 (02/03/22 1831)  BP: (!) 141/85 (02/03/22 2143)  SpO2: 96 % (02/03/22 2143) Vital Signs (24h Range):  Temp:  [98.3 °F (36.8 °C)] 98.3 °F (36.8 °C)  Pulse:  [112-123] 112  Resp:  [18] 18  SpO2:  [95 %-96 %] 96 %  BP: (132-141)/(85-90) 141/85        There is no height or weight on file to calculate BMI.    Physical Exam  Constitutional:       General: He is not in acute distress.     Appearance: He is well-developed and well-nourished. He is not diaphoretic.   HENT:      Head: Normocephalic and atraumatic.      Nose: Nose normal.      Mouth/Throat:      Mouth: Oropharynx is clear and moist.      Pharynx: No oropharyngeal exudate.   Eyes:      General: No scleral icterus.     Extraocular Movements: EOM normal.      Conjunctiva/sclera: Conjunctivae normal.      Pupils: Pupils are equal, round, and reactive to light.   Neck:      Thyroid: No thyromegaly.      Vascular: No JVD.      Trachea: No tracheal deviation.   Cardiovascular:      Rate and Rhythm: Normal rate and regular rhythm.      Pulses: Intact  distal pulses.      Heart sounds: Normal heart sounds. No murmur heard.      Pulmonary:      Effort: Pulmonary effort is normal. No respiratory distress.      Breath sounds: Normal breath sounds. No wheezing or rales.   Chest:      Chest wall: No tenderness.   Abdominal:      General: Bowel sounds are normal. There is no distension.      Palpations: Abdomen is soft. There is no mass.      Tenderness: There is no abdominal tenderness. There is no guarding or rebound.   Musculoskeletal:         General: No tenderness or edema. Normal range of motion.      Cervical back: Normal range of motion and neck supple.   Lymphadenopathy:      Cervical: No cervical adenopathy.   Skin:     General: Skin is warm and dry.      Findings: No erythema or rash.   Neurological:      Mental Status: He is alert and oriented to person, place, and time.      Cranial Nerves: No cranial nerve deficit.      Motor: No abnormal muscle tone.      Coordination: Coordination normal.      Deep Tendon Reflexes: Reflexes are normal and symmetric. Reflexes normal.      Comments: Mild tremors of upper extremities    Psychiatric:         Mood and Affect: Mood and affect normal.         Thought Content: Thought content normal.         Judgment: Judgment normal.           CRANIAL NERVES     CN III, IV, VI   Pupils are equal, round, and reactive to light.  Extraocular motions are normal.        Significant Labs:   Recent Results (from the past 24 hour(s))   Urinalysis, Reflex to Urine Culture Urine, Clean Catch    Collection Time: 02/03/22 10:03 PM    Specimen: Urine   Result Value Ref Range    Specimen UA Urine, Clean Catch     Color, UA Yellow Yellow, Straw, Noelle    Appearance, UA Clear Clear    pH, UA 5.0 5.0 - 8.0    Specific Gravity, UA >=1.030 (A) 1.005 - 1.030    Protein, UA Negative Negative    Glucose, UA 3+ (A) Negative    Ketones, UA 1+ (A) Negative    Bilirubin (UA) Negative Negative    Occult Blood UA Negative Negative    Nitrite, UA Negative  Negative    Leukocytes, UA Negative Negative   Drug screen panel, emergency    Collection Time: 02/03/22 10:03 PM   Result Value Ref Range    Benzodiazepines Negative Negative    Methadone metabolites Negative Negative    Cocaine (Metab.) Negative Negative    Opiate Scrn, Ur Negative Negative    Barbiturate Screen, Ur Negative Negative    Amphetamine Screen, Ur Negative Negative    THC Negative Negative    Phencyclidine Negative Negative    Creatinine, Urine 55.0 23.0 - 375.0 mg/dL    Toxicology Information SEE COMMENT    Urinalysis Microscopic    Collection Time: 02/03/22 10:03 PM   Result Value Ref Range    RBC, UA 1 0 - 4 /hpf    WBC, UA 1 0 - 5 /hpf    Bacteria None None-Occ /hpf    Yeast, UA None None    Squam Epithel, UA 1 /hpf    Microscopic Comment SEE COMMENT    Ethanol    Collection Time: 02/03/22 10:27 PM   Result Value Ref Range    Alcohol, Serum 263 (H) <10 mg/dL   Acetaminophen level    Collection Time: 02/03/22 10:27 PM   Result Value Ref Range    Acetaminophen (Tylenol), Serum <3.0 (L) 10.0 - 20.0 ug/mL   Comprehensive metabolic panel    Collection Time: 02/03/22 10:27 PM   Result Value Ref Range    Sodium 139 136 - 145 mmol/L    Potassium 3.8 3.5 - 5.1 mmol/L    Chloride 100 95 - 110 mmol/L    CO2 25 23 - 29 mmol/L    Glucose 283 (H) 70 - 110 mg/dL    BUN 10 6 - 20 mg/dL    Creatinine 0.8 0.5 - 1.4 mg/dL    Calcium 8.9 8.7 - 10.5 mg/dL    Total Protein 6.7 6.0 - 8.4 g/dL    Albumin 3.8 3.5 - 5.2 g/dL    Total Bilirubin 0.3 0.1 - 1.0 mg/dL    Alkaline Phosphatase 40 (L) 55 - 135 U/L    AST 39 10 - 40 U/L    ALT 36 10 - 44 U/L    Anion Gap 14 8 - 16 mmol/L    eGFR if African American >60.0 >60 mL/min/1.73 m^2    eGFR if non African American >60.0 >60 mL/min/1.73 m^2   POCT COVID-19 Rapid Screening    Collection Time: 02/03/22 10:42 PM   Result Value Ref Range    POC Rapid COVID Negative Negative     Acceptable Yes    CBC auto differential    Collection Time: 02/04/22 12:12 AM   Result  Value Ref Range    WBC 5.51 3.90 - 12.70 K/uL    RBC 4.79 4.60 - 6.20 M/uL    Hemoglobin 14.0 14.0 - 18.0 g/dL    Hematocrit 41.4 40.0 - 54.0 %    MCV 86 82 - 98 fL    MCH 29.2 27.0 - 31.0 pg    MCHC 33.8 32.0 - 36.0 g/dL    RDW 16.8 (H) 11.5 - 14.5 %    Platelets 131 (L) 150 - 450 K/uL    MPV 13.6 (H) 9.2 - 12.9 fL    Immature Granulocytes 0.2 0.0 - 0.5 %    Gran # (ANC) 2.1 1.8 - 7.7 K/uL    Immature Grans (Abs) 0.01 0.00 - 0.04 K/uL    Lymph # 2.8 1.0 - 4.8 K/uL    Mono # 0.4 0.3 - 1.0 K/uL    Eos # 0.1 0.0 - 0.5 K/uL    Baso # 0.06 0.00 - 0.20 K/uL    nRBC 0 0 /100 WBC    Gran % 38.4 38.0 - 73.0 %    Lymph % 50.5 (H) 18.0 - 48.0 %    Mono % 7.4 4.0 - 15.0 %    Eosinophil % 2.4 0.0 - 8.0 %    Basophil % 1.1 0.0 - 1.9 %    Differential Method Automated          Significant Imaging: I have reviewed all pertinent imaging results/findings within the past 24 hours.    Assessment/Plan:     * Alcohol withdrawal syndrome with complication  -presented with tremors and anxiety consistent with alcohol withdrawal   -recurrent as patient was admitted to hospital in December and eloped prior completion of detox   -drinks 2 fifth of whisky daily and last drinks earlier today   -ETOH level elevated 263 and UDS negative   -denies withdrawal seizure   -received valium 10 mg IV x 2 in ED for tachycardia and tremors related to ETOH withdrawal   -will start on valium 10 mg PO tid with taper as patient is high risk for withdrawal   -ativan IV 1 mg prn CIWA > 8  -thiamine and folate daily         Alcohol use disorder, severe, dependence  -long history of alcoholism   -seen by addiction psychiatry during last admission   -counseled complete cessation   -outpatient detox if patient is ready       Tobacco use  -counseled about health benefit of cessation       Type 2 diabetes mellitus, without long-term current use of insulin  -glucose elevated to 280s  -not in DKA  -last A1C 6.7 in December   -hold metformin   -NS 1L bolus for  hyperglycemia   -will start on levemir 15 HS and LSSI   -adjust insulin based on CBG and PO intake       VTE Risk Mitigation (From admission, onward)         Ordered     IP VTE LOW RISK PATIENT  Once         02/04/22 0152     Place sequential compression device  Until discontinued         02/04/22 0152                   Edinson Nolen DO  Department of Hospital Medicine   [facility] - Emergency Dept

## 2022-02-04 NOTE — ED NOTES
"The patient is sitting in a chair attired in [facility] provided scrubs w/ fair grooming & hygiene. His affect & mood is congruent, pleasant & engaging. He denies S/HI, A/VH. He states, " I'm a bad alcoholic. He becomes tearful as he speak of his alcoholism. He denies blackouts stating, " not recently." He denies withdrawal seizures. He is able to hold his arms outstretched w/ fingers parted w/o any noted tremors. He is maintained on DVC for safety. He is provided w/ a pitcher of ice & water & 3 servings of OJ, a Turkey sandwich & 3 packs of Zachery crackers The patient is tolerating his snacks well.  "

## 2022-02-04 NOTE — ED NOTES
Pt changed out into provided hospital clothing, belongings stored, and pt is in a good cooperative mood.

## 2022-02-04 NOTE — ED NOTES
Provided w/ 2 servings of Corn Flakes w/ milk, tolerating well. DVC maintained. Offering no complaints.

## 2022-02-04 NOTE — ASSESSMENT & PLAN NOTE
-presented with tremors and anxiety consistent with alcohol withdrawal   -recurrent as patient was admitted to hospital in December and eloped prior completion of detox   -drinks 2 fifth of whisky daily and last drinks earlier today   -ETOH level elevated 263 and UDS negative   -denies withdrawal seizure   -received valium 10 mg IV x 2 in ED for tachycardia and tremors related to ETOH withdrawal   -will start on valium 10 mg PO tid with taper as patient is high risk for withdrawal   -ativan IV 1 mg prn CIWA > 8  -thiamine and folate daily

## 2022-02-04 NOTE — PHARMACY MED REC
"Admission Medication History     The home medication history was taken by Allison Reis.    You may go to "Admission" then "Reconcile Home Medications" tabs to review and/or act upon these items.      The home medication list has been updated by the Pharmacy department.    Please read ALL comments highlighted in yellow.    Please address this information as you see fit.     Feel free to contact us if you have any questions or require assistance.      The medications listed below were removed from the home medication list. Please reorder if appropriate:  Patient reports no longer taking the following medication(s):   CETIRIZINE 10 MG   ESCITALOPRAM 10 MG   FLUTICASONE NASAL SPRAY      Medications listed below were obtained from: Patient/family  Current Outpatient Medications on File Prior to Encounter   Medication Sig    folic acid (FOLVITE) 1 MG tablet Take 1,000 mcg by mouth once daily.    ibuprofen (ADVIL,MOTRIN) 200 MG tablet Take 600 mg by mouth every 6 (six) hours as needed for Pain.    metFORMIN (GLUCOPHAGE) 500 MG tablet Take 1,000 mg by mouth 2 (two) times a day.    thiamine (VITAMIN B-1) 50 MG tablet Take 50 mg by mouth once daily.               Allison Reis  EXT [id number]                    .          "

## 2022-02-04 NOTE — SUBJECTIVE & OBJECTIVE
Past Medical History:   Diagnosis Date    Alcoholism     Diabetes mellitus     Tobacco use        No past surgical history on file.    Review of patient's allergies indicates:  No Known Allergies    No current facility-administered medications on file prior to encounter.     Current Outpatient Medications on File Prior to Encounter   Medication Sig    cetirizine (ZYRTEC) 10 MG tablet Take 10 mg by mouth once daily.    EScitalopram oxalate (LEXAPRO) 10 MG tablet Take 10 mg by mouth once daily.    fluticasone propionate (FLONASE) 50 mcg/actuation nasal spray by Each Nostril route.    folic acid (FOLVITE) 1 MG tablet Take 1,000 mcg by mouth once daily.    metformin HCl (METFORMIN ORAL) Take by mouth.    thiamine (VITAMIN B-1) 50 MG tablet Take 50 mg by mouth.     Family History    None       Tobacco Use    Smoking status: Current Every Day Smoker    Smokeless tobacco: Never Used   Substance and Sexual Activity    Alcohol use: Yes    Drug use: Not Currently    Sexual activity: Not on file     Review of Systems   Constitutional: Negative for activity change, appetite change, chills, diaphoresis, fatigue, fever and unexpected weight change.   HENT: Negative for congestion, dental problem, drooling, ear discharge, ear pain, facial swelling, hearing loss, mouth sores, nosebleeds, postnasal drip, rhinorrhea, sinus pressure, sneezing, sore throat, tinnitus, trouble swallowing and voice change.    Eyes: Negative for photophobia, pain, discharge, redness, itching and visual disturbance.   Respiratory: Negative for apnea, cough, choking, chest tightness, shortness of breath, wheezing and stridor.    Cardiovascular: Negative for chest pain, palpitations and leg swelling.   Gastrointestinal: Negative for abdominal distention, abdominal pain, anal bleeding, blood in stool, constipation, diarrhea, nausea, rectal pain and vomiting.   Endocrine: Negative for cold intolerance, heat intolerance, polydipsia, polyphagia and  polyuria.   Genitourinary: Negative for decreased urine volume, difficulty urinating, dysuria, enuresis, flank pain, frequency, genital sores, hematuria, penile discharge, penile pain, penile swelling, scrotal swelling, testicular pain and urgency.   Musculoskeletal: Negative for arthralgias, back pain, gait problem, joint swelling, myalgias, neck pain and neck stiffness.   Skin: Negative for color change, pallor, rash and wound.   Allergic/Immunologic: Negative for environmental allergies, food allergies and immunocompromised state.   Neurological: Positive for tremors. Negative for dizziness, seizures, syncope, facial asymmetry, speech difficulty, weakness, light-headedness, numbness and headaches.   Hematological: Negative for adenopathy. Does not bruise/bleed easily.   Psychiatric/Behavioral: Negative for agitation, behavioral problems, confusion, decreased concentration, dysphoric mood, hallucinations, self-injury, sleep disturbance and suicidal ideas. The patient is nervous/anxious. The patient is not hyperactive.      Objective:     Vital Signs (Most Recent):  Temp: 98.3 °F (36.8 °C) (02/03/22 1831)  Pulse: (!) 112 (02/03/22 2143)  Resp: 18 (02/03/22 1831)  BP: (!) 141/85 (02/03/22 2143)  SpO2: 96 % (02/03/22 2143) Vital Signs (24h Range):  Temp:  [98.3 °F (36.8 °C)] 98.3 °F (36.8 °C)  Pulse:  [112-123] 112  Resp:  [18] 18  SpO2:  [95 %-96 %] 96 %  BP: (132-141)/(85-90) 141/85        There is no height or weight on file to calculate BMI.    Physical Exam  Constitutional:       General: He is not in acute distress.     Appearance: He is well-developed and well-nourished. He is not diaphoretic.   HENT:      Head: Normocephalic and atraumatic.      Nose: Nose normal.      Mouth/Throat:      Mouth: Oropharynx is clear and moist.      Pharynx: No oropharyngeal exudate.   Eyes:      General: No scleral icterus.     Extraocular Movements: EOM normal.      Conjunctiva/sclera: Conjunctivae normal.      Pupils: Pupils  are equal, round, and reactive to light.   Neck:      Thyroid: No thyromegaly.      Vascular: No JVD.      Trachea: No tracheal deviation.   Cardiovascular:      Rate and Rhythm: Normal rate and regular rhythm.      Pulses: Intact distal pulses.      Heart sounds: Normal heart sounds. No murmur heard.      Pulmonary:      Effort: Pulmonary effort is normal. No respiratory distress.      Breath sounds: Normal breath sounds. No wheezing or rales.   Chest:      Chest wall: No tenderness.   Abdominal:      General: Bowel sounds are normal. There is no distension.      Palpations: Abdomen is soft. There is no mass.      Tenderness: There is no abdominal tenderness. There is no guarding or rebound.   Musculoskeletal:         General: No tenderness or edema. Normal range of motion.      Cervical back: Normal range of motion and neck supple.   Lymphadenopathy:      Cervical: No cervical adenopathy.   Skin:     General: Skin is warm and dry.      Findings: No erythema or rash.   Neurological:      Mental Status: He is alert and oriented to person, place, and time.      Cranial Nerves: No cranial nerve deficit.      Motor: No abnormal muscle tone.      Coordination: Coordination normal.      Deep Tendon Reflexes: Reflexes are normal and symmetric. Reflexes normal.      Comments: Mild tremors of upper extremities    Psychiatric:         Mood and Affect: Mood and affect normal.         Thought Content: Thought content normal.         Judgment: Judgment normal.           CRANIAL NERVES     CN III, IV, VI   Pupils are equal, round, and reactive to light.  Extraocular motions are normal.        Significant Labs:   Recent Results (from the past 24 hour(s))   Urinalysis, Reflex to Urine Culture Urine, Clean Catch    Collection Time: 02/03/22 10:03 PM    Specimen: Urine   Result Value Ref Range    Specimen UA Urine, Clean Catch     Color, UA Yellow Yellow, Straw, Noelle    Appearance, UA Clear Clear    pH, UA 5.0 5.0 - 8.0    Specific  Gravity, UA >=1.030 (A) 1.005 - 1.030    Protein, UA Negative Negative    Glucose, UA 3+ (A) Negative    Ketones, UA 1+ (A) Negative    Bilirubin (UA) Negative Negative    Occult Blood UA Negative Negative    Nitrite, UA Negative Negative    Leukocytes, UA Negative Negative   Drug screen panel, emergency    Collection Time: 02/03/22 10:03 PM   Result Value Ref Range    Benzodiazepines Negative Negative    Methadone metabolites Negative Negative    Cocaine (Metab.) Negative Negative    Opiate Scrn, Ur Negative Negative    Barbiturate Screen, Ur Negative Negative    Amphetamine Screen, Ur Negative Negative    THC Negative Negative    Phencyclidine Negative Negative    Creatinine, Urine 55.0 23.0 - 375.0 mg/dL    Toxicology Information SEE COMMENT    Urinalysis Microscopic    Collection Time: 02/03/22 10:03 PM   Result Value Ref Range    RBC, UA 1 0 - 4 /hpf    WBC, UA 1 0 - 5 /hpf    Bacteria None None-Occ /hpf    Yeast, UA None None    Squam Epithel, UA 1 /hpf    Microscopic Comment SEE COMMENT    Ethanol    Collection Time: 02/03/22 10:27 PM   Result Value Ref Range    Alcohol, Serum 263 (H) <10 mg/dL   Acetaminophen level    Collection Time: 02/03/22 10:27 PM   Result Value Ref Range    Acetaminophen (Tylenol), Serum <3.0 (L) 10.0 - 20.0 ug/mL   Comprehensive metabolic panel    Collection Time: 02/03/22 10:27 PM   Result Value Ref Range    Sodium 139 136 - 145 mmol/L    Potassium 3.8 3.5 - 5.1 mmol/L    Chloride 100 95 - 110 mmol/L    CO2 25 23 - 29 mmol/L    Glucose 283 (H) 70 - 110 mg/dL    BUN 10 6 - 20 mg/dL    Creatinine 0.8 0.5 - 1.4 mg/dL    Calcium 8.9 8.7 - 10.5 mg/dL    Total Protein 6.7 6.0 - 8.4 g/dL    Albumin 3.8 3.5 - 5.2 g/dL    Total Bilirubin 0.3 0.1 - 1.0 mg/dL    Alkaline Phosphatase 40 (L) 55 - 135 U/L    AST 39 10 - 40 U/L    ALT 36 10 - 44 U/L    Anion Gap 14 8 - 16 mmol/L    eGFR if African American >60.0 >60 mL/min/1.73 m^2    eGFR if non African American >60.0 >60 mL/min/1.73 m^2   POCT  COVID-19 Rapid Screening    Collection Time: 02/03/22 10:42 PM   Result Value Ref Range    POC Rapid COVID Negative Negative     Acceptable Yes    CBC auto differential    Collection Time: 02/04/22 12:12 AM   Result Value Ref Range    WBC 5.51 3.90 - 12.70 K/uL    RBC 4.79 4.60 - 6.20 M/uL    Hemoglobin 14.0 14.0 - 18.0 g/dL    Hematocrit 41.4 40.0 - 54.0 %    MCV 86 82 - 98 fL    MCH 29.2 27.0 - 31.0 pg    MCHC 33.8 32.0 - 36.0 g/dL    RDW 16.8 (H) 11.5 - 14.5 %    Platelets 131 (L) 150 - 450 K/uL    MPV 13.6 (H) 9.2 - 12.9 fL    Immature Granulocytes 0.2 0.0 - 0.5 %    Gran # (ANC) 2.1 1.8 - 7.7 K/uL    Immature Grans (Abs) 0.01 0.00 - 0.04 K/uL    Lymph # 2.8 1.0 - 4.8 K/uL    Mono # 0.4 0.3 - 1.0 K/uL    Eos # 0.1 0.0 - 0.5 K/uL    Baso # 0.06 0.00 - 0.20 K/uL    nRBC 0 0 /100 WBC    Gran % 38.4 38.0 - 73.0 %    Lymph % 50.5 (H) 18.0 - 48.0 %    Mono % 7.4 4.0 - 15.0 %    Eosinophil % 2.4 0.0 - 8.0 %    Basophil % 1.1 0.0 - 1.9 %    Differential Method Automated          Significant Imaging: I have reviewed all pertinent imaging results/findings within the past 24 hours.

## 2022-02-04 NOTE — ASSESSMENT & PLAN NOTE
-long history of alcoholism   -seen by addiction psychiatry during last admission   -counseled complete cessation   -outpatient detox if patient is ready

## 2022-02-04 NOTE — ED NOTES
Saline lock #20 g to (R) hand. The patient is anxious in regards to blood work, requires mild - moderate encouragement, supportive milieu maintained. Site cleaned & placement secured. DVC maintained.

## 2022-02-04 NOTE — ED PROVIDER NOTES
"Encounter Date: 2/3/2022       History     Chief Complaint   Patient presents with    Alcohol Intoxication     Here for detox, +ETOH, last drink within the hours, has had "2 bottle and a couple beers" denies SI/ HI, also states paranoid hallucinating hearing voices, states "i'm very drunk"     35 y/o M PMHx of T2DM, tobabacco use, and alcohol abuse, previously admitted to hospital medicine for alcohol withdrawal and eloped from hospital 12/12/2021. Patient states he has history of tremors if without alcohol, but states he has never had a withdrawal seizure. Motivated to quit drinking, but has unsuccessfully tried to self-taper off alcohol. Patient states he drinks up to "2 fifths" (approx 1.5 L) of whiskey daily on average. Unable to recall last drink today, but believes it was some time this afternoon. States he begins to have bad withdrawal symptoms within 6-8 hours in the past. Currently able to tolerate PO intake (water and sandwich) without issue. Denies SI/HI nausea/vomiting, seizures, constipation/diarrhea, abdominal pain, chest pain, shortness of breath, agitation, diaphoresis. Patient on presentation is mild-moderately tremulous, hypertensive, tachycardic, but not diaphoretic.         Review of patient's allergies indicates:  No Known Allergies  Past Medical History:   Diagnosis Date    Alcoholism     Diabetes mellitus     Tobacco use      No past surgical history on file.  No family history on file.  Social History     Tobacco Use    Smoking status: Current Every Day Smoker    Smokeless tobacco: Never Used   Substance Use Topics    Alcohol use: Yes    Drug use: Not Currently     Review of Systems   Constitutional: Negative for chills, diaphoresis and fever.   HENT: Negative for sneezing, sore throat and trouble swallowing.    Respiratory: Negative for cough and shortness of breath.    Cardiovascular: Negative for chest pain and palpitations.   Gastrointestinal: Negative for constipation, diarrhea, " nausea and vomiting.   Genitourinary: Negative for dysuria and urgency.   Musculoskeletal: Negative for arthralgias and myalgias.   Skin: Negative for rash and wound.   Neurological: Positive for tremors. Negative for seizures, syncope, speech difficulty, weakness and headaches.   Psychiatric/Behavioral: Positive for hallucinations. Negative for agitation, confusion and suicidal ideas.       Physical Exam     Initial Vitals [02/03/22 1831]   BP Pulse Resp Temp SpO2   (!) 132/90 (!) 123 18 98.3 °F (36.8 °C) 95 %      MAP       --         Physical Exam    Constitutional: He appears well-developed. He is not diaphoretic. No distress.   HENT:   Head: Normocephalic and atraumatic.   Eyes: Conjunctivae and EOM are normal.   Neck: No JVD present.   Normal range of motion.  Cardiovascular: Regular rhythm, normal heart sounds and intact distal pulses. Tachycardia present.  Exam reveals no gallop and no friction rub.    Pulmonary/Chest: Breath sounds normal. No respiratory distress. He has no wheezes. He has no rales.   Abdominal: Abdomen is soft. Bowel sounds are normal. He exhibits distension. He exhibits no mass. There is abdominal tenderness (LLQ/LUQ TTP, mild). There is no rebound and no guarding.   Musculoskeletal:         General: Normal range of motion.      Cervical back: Normal range of motion.     Neurological: He is alert and oriented to person, place, and time. No cranial nerve deficit or sensory deficit.   Tongue fasciculations, bilateral hand tremors   Skin: Skin is warm and dry. Capillary refill takes less than 2 seconds.   Psychiatric: He has a normal mood and affect. His behavior is normal. Judgment and thought content normal.         ED Course   Procedures  Labs Reviewed   URINALYSIS, REFLEX TO URINE CULTURE - Abnormal; Notable for the following components:       Result Value    Specific Gravity, UA >=1.030 (*)     Glucose, UA 3+ (*)     Ketones, UA 1+ (*)     All other components within normal limits     Narrative:     Specimen Source->Urine   DRUG SCREEN PANEL, URINE EMERGENCY    Narrative:     Specimen Source->Urine   URINALYSIS MICROSCOPIC    Narrative:     Specimen Source->Urine   ALCOHOL,MEDICAL (ETHANOL)   ACETAMINOPHEN LEVEL   COMPREHENSIVE METABOLIC PANEL   CBC W/ AUTO DIFFERENTIAL   SARS-COV-2 RDRP GENE    Narrative:     This test utilizes isothermal nucleic acid amplification   technology to detect the SARS-CoV-2 RdRp nucleic acid segment.   The analytical sensitivity (limit of detection) is 125 genome   equivalents/mL.   A POSITIVE result implies infection with the SARS-CoV-2 virus;   the patient is presumed to be contagious.     A NEGATIVE result means that SARS-CoV-2 nucleic acids are not   present above the limit of detection. A NEGATIVE result should be   treated as presumptive. It does not rule out the possibility of   COVID-19 and should not be the sole basis for treatment decisions.   If COVID-19 is strongly suspected based on clinical and exposure   history, re-testing using an alternate molecular assay should be   considered.   This test is only for use under the Food and Drug   Administration s Emergency Use Authorization (EUA).   Commercial kits are provided by [employer].   Performance characteristics of the EUA have been independently   verified by [facility] Department of   Pathology and Laboratory Medicine.   _________________________________________________________________   The authorized Fact Sheet for Healthcare Providers and the authorized Fact   Sheet for Patients of the ID NOW COVID-19 are available on the FDA   website:     https://www.fda.gov/media/389270/download  https://www.fda.gov/media/727342/download                Imaging Results    None          Medications   multivitamin tablet (has no administration in time range)   folic acid tablet 1 mg (has no administration in time range)   thiamine tablet 100 mg (has no administration in time range)   diazePAM  injection 10 mg (10 mg Intravenous Given 2/3/22 2230)     Medical Decision Making:   Initial Assessment:   37 y/o M with PMHx polysubstance abuse (marijuana, cocaine) and alcohol dependence disorder presenting to [facility] ED acutely intoxicated from alcohol, experiencing tremors, tachycardic, visual/auditory hallucinations. Given previous history of alcohol use, withdrawal, and previous admissions with similar presentation, likely recurrence of alcohol withdrawal. PO repletion of fluids and nutrition, multivitamin, thiamine, folate supplementation provided. IV diazepam 10 given PRN as patient began to progress with increasing agitation, anxiety, tremors, diaphoresis while in ED.    Dispo admission to hospital medicine for obs.  Differential Diagnosis:   Acute alcohol withdrawal, DT, hepatic encephalopathy, uremic encephalopathy, chronic pancreatitis  Clinical Tests:   Lab Tests: Ordered  ED Management:  CBC, CMP, ETOH level, Tylenol level, drug screen  PO repletion (fluids/food)  Multivitamin, folate, thiamine repletion  Diazepam 10 IV x 2             ED Course as of 02/03/22 2357   Thu Feb 03, 2022   2345 CBC auto differential [ZC]      ED Course User Index  [ZC] Neelima Younger MD             Clinical Impression:   Final diagnoses:  [F10.232] Alcohol withdrawal syndrome with perceptual disturbance (Primary)                 Neelima Younger MD  Resident  02/04/22 0059

## 2022-02-04 NOTE — HPI
Mr. Cook is a 36 year old male with PMHx of T2DM, tobabacco use, and alcohol abuse who presented to ED requesting alcohol detox. Patient states he has a long history of alcoholism and drinks about 2 fifth of hard liquor daily. He reports waking up in the morning needing to drink to avoid tremors. Patient reports his last drink about 3 pm yesterday when he decided to quit and seek detox. His roommate drove him to hospital. While in ED patient developed tremors and tachycardia consistent with his prior episodes of alcohol withdrawal. Patient notes he recently got fired from his job as  due to drinking problem and he is tired of drinking. He was admitted to hospital in December for withdrawal and eloped 2 days later due to anxiety per patient. He failed rehab in the past. Patient denies seizure, SI/HI. He endorses smoking cigarettes 1 PPD, but denies IVDU or other illicit drug use.     ED course: patient with tremors and tachycardic HR ~120. He received valium 10 mg IV x. 2.

## 2022-02-04 NOTE — ED NOTES
"The patient is anxious, he is anticipating "shaking in about an hour, or 30 or 45 mins." He is also concerned that if he's discharged, his roommate will either be asleep or won't be home & he doesn't have his key. He was cooperative w/ the COVID swab procedure & providing a urine sample. He is provided a supportive environment. DVC is maintained.  "

## 2022-02-05 LAB
POCT GLUCOSE: 176 MG/DL (ref 70–110)
POCT GLUCOSE: 221 MG/DL (ref 70–110)
POCT GLUCOSE: 222 MG/DL (ref 70–110)

## 2022-02-05 PROCEDURE — 63600175 PHARM REV CODE 636 W HCPCS: Performed by: INTERNAL MEDICINE

## 2022-02-05 PROCEDURE — C9399 UNCLASSIFIED DRUGS OR BIOLOG: HCPCS | Performed by: HOSPITALIST

## 2022-02-05 PROCEDURE — 11000001 HC ACUTE MED/SURG PRIVATE ROOM

## 2022-02-05 PROCEDURE — 99233 SBSQ HOSP IP/OBS HIGH 50: CPT | Mod: ,,, | Performed by: INTERNAL MEDICINE

## 2022-02-05 PROCEDURE — 25000003 PHARM REV CODE 250: Performed by: INTERNAL MEDICINE

## 2022-02-05 PROCEDURE — 99233 PR SUBSEQUENT HOSPITAL CARE,LEVL III: ICD-10-PCS | Mod: ,,, | Performed by: INTERNAL MEDICINE

## 2022-02-05 PROCEDURE — 63600175 PHARM REV CODE 636 W HCPCS: Performed by: HOSPITALIST

## 2022-02-05 PROCEDURE — 25000003 PHARM REV CODE 250: Performed by: HOSPITALIST

## 2022-02-05 RX ADMIN — FOLIC ACID 1 MG: 1 TABLET ORAL at 08:02

## 2022-02-05 RX ADMIN — DIAZEPAM 10 MG: 5 TABLET ORAL at 03:02

## 2022-02-05 RX ADMIN — LORAZEPAM 1 MG: 2 INJECTION INTRAMUSCULAR; INTRAVENOUS at 01:02

## 2022-02-05 RX ADMIN — DIAZEPAM 10 MG: 5 TABLET ORAL at 09:02

## 2022-02-05 RX ADMIN — INSULIN DETEMIR 15 UNITS: 100 INJECTION, SOLUTION SUBCUTANEOUS at 09:02

## 2022-02-05 RX ADMIN — DIAZEPAM 10 MG: 5 TABLET ORAL at 08:02

## 2022-02-05 RX ADMIN — Medication 100 MG: at 08:02

## 2022-02-05 RX ADMIN — ESCITALOPRAM OXALATE 10 MG: 5 TABLET, FILM COATED ORAL at 08:02

## 2022-02-05 RX ADMIN — THERA TABS 1 TABLET: TAB at 08:02

## 2022-02-05 RX ADMIN — INSULIN ASPART 1 UNITS: 100 INJECTION, SOLUTION INTRAVENOUS; SUBCUTANEOUS at 09:02

## 2022-02-05 RX ADMIN — Medication 6 MG: at 09:02

## 2022-02-05 NOTE — PLAN OF CARE
Problem: Adult Inpatient Plan of Care  Goal: Plan of Care Review  Outcome: Ongoing, Progressing  Flowsheets (Taken 2/5/2022 1653)  Plan of Care Reviewed With: patient  Goal: Absence of Hospital-Acquired Illness or Injury  Intervention: Identify and Manage Fall Risk  Flowsheets (Taken 2/5/2022 1653)  Safety Promotion/Fall Prevention:   assistive device/personal item within reach   side rails raised x 2   Fall Risk reviewed with patient/family   medications reviewed   lighting adjusted   Patient in bed resting quietly, easily aroused to verbal stimuli, mild tremors noted when patient hold arms out, CIWA is 6, no signs of distress noted, voice no concerns at present.

## 2022-02-05 NOTE — PLAN OF CARE
Problem: Adult Inpatient Plan of Care  Goal: Plan of Care Review  Outcome: Ongoing, Progressing  Goal: Patient-Specific Goal (Individualized)  Outcome: Ongoing, Progressing  Goal: Absence of Hospital-Acquired Illness or Injury  Outcome: Ongoing, Progressing  Goal: Optimal Comfort and Wellbeing  Outcome: Ongoing, Progressing     Problem: Diabetes Comorbidity  Goal: Blood Glucose Level Within Targeted Range  2/5/2022 0628 by Stanislaw Mcgraw RN  Outcome: Ongoing, Progressing  2/5/2022 0627 by Stanislaw Mcgraw RN  Outcome: Ongoing, Progressing     Problem: Alcohol Withdrawal  Goal: Alcohol Withdrawal Symptom Control  Outcome: Ongoing, Progressing     Problem: Substance Misuse (Alcohol Withdrawal)  Goal: Readiness for Change Identified  Outcome: Ongoing, Progressing

## 2022-02-05 NOTE — NURSING
Received patient from ed, patient aox4, no distress noted. Safety measures implemented, call light and personal items within reach. Patient educated to call for assistance, patient verbalized understanding

## 2022-02-05 NOTE — ASSESSMENT & PLAN NOTE
-presented with tremors and anxiety consistent with alcohol withdrawal   -recurrent as patient was admitted to hospital in December and eloped prior completion of detox   -drinks 2 fifth of whisky daily and last drinks earlier on admission date  -ETOH level elevated 263 and UDS negative   -denies withdrawal seizure   -received valium 10 mg IV x 2 in ED for tachycardia and tremors related to ETOH withdrawal   -will start on valium 10 mg PO tid with taper as patient is high risk for withdrawal   -ativan IV 1 mg prn CIWA > 8-15 and 2mg IV for >15  -thiamine and folate daily

## 2022-02-05 NOTE — SUBJECTIVE & OBJECTIVE
Interval History: No acute events overnight. HDS and afebrile. No new complaints this AM. Feels like he is improving gradually. Tremors also better. Remains on valium taper , plan to start taper tomorrow. Had a long conversation about his drinking habits and what he can do to improve. He doesn't seem depressed but no so this he has been bored and this has been his habit for the past 15 years.     Review of Systems   Constitutional: Negative for activity change, appetite change, chills, diaphoresis, fatigue, fever and unexpected weight change.   HENT: Negative for congestion, dental problem, drooling, ear discharge, ear pain, facial swelling, hearing loss, mouth sores, nosebleeds, postnasal drip, rhinorrhea, sinus pressure, sneezing, sore throat, tinnitus, trouble swallowing and voice change.    Eyes: Negative for photophobia, pain, discharge, redness, itching and visual disturbance.   Respiratory: Negative for apnea, cough, choking, chest tightness, shortness of breath, wheezing and stridor.    Cardiovascular: Negative for chest pain, palpitations and leg swelling.   Gastrointestinal: Negative for abdominal distention, abdominal pain, anal bleeding, blood in stool, constipation, diarrhea, nausea, rectal pain and vomiting.   Endocrine: Negative for cold intolerance, heat intolerance, polydipsia, polyphagia and polyuria.   Genitourinary: Negative for decreased urine volume, difficulty urinating, dysuria, enuresis, flank pain, frequency, genital sores, hematuria, penile discharge, penile pain, penile swelling, scrotal swelling, testicular pain and urgency.   Musculoskeletal: Negative for arthralgias, back pain, gait problem, joint swelling, myalgias, neck pain and neck stiffness.   Skin: Negative for color change, pallor, rash and wound.   Allergic/Immunologic: Negative for environmental allergies, food allergies and immunocompromised state.   Neurological: Positive for tremors. Negative for dizziness, seizures,  syncope, facial asymmetry, speech difficulty, weakness, light-headedness, numbness and headaches.   Hematological: Negative for adenopathy. Does not bruise/bleed easily.   Psychiatric/Behavioral: Negative for agitation, behavioral problems, confusion, decreased concentration, dysphoric mood, hallucinations, self-injury, sleep disturbance and suicidal ideas. The patient is nervous/anxious. The patient is not hyperactive.        Objective:     Vital Signs (Most Recent):  Temp: 97 °F (36.1 °C) (02/05/22 0742)  Pulse: 95 (02/05/22 0742)  Resp: 16 (02/05/22 0742)  BP: (!) 154/91 (02/05/22 0742)  SpO2: (!) 91 % (02/05/22 0742) Vital Signs (24h Range):  Temp:  [96.1 °F (35.6 °C)-98.8 °F (37.1 °C)] 97 °F (36.1 °C)  Pulse:  [] 95  Resp:  [16-18] 16  SpO2:  [91 %-99 %] 91 %  BP: (141-167)/() 154/91     Weight: 86.2 kg (190 lb)  Body mass index is 26.5 kg/m².    Intake/Output Summary (Last 24 hours) at 2/5/2022 1219  Last data filed at 2/5/2022 0700  Gross per 24 hour   Intake 800 ml   Output 700 ml   Net 100 ml      Physical Exam  Constitutional:       General: He is not in acute distress.     Appearance: He is well-developed and well-nourished. He is not diaphoretic.   HENT:      Head: Normocephalic and atraumatic.      Nose: Nose normal.      Mouth/Throat:      Mouth: Oropharynx is clear and moist.      Pharynx: No oropharyngeal exudate.   Eyes:      General: No scleral icterus.     Extraocular Movements: EOM normal.      Conjunctiva/sclera: Conjunctivae normal.      Pupils: Pupils are equal, round, and reactive to light.   Neck:      Thyroid: No thyromegaly.      Vascular: No JVD.      Trachea: No tracheal deviation.   Cardiovascular:      Rate and Rhythm: Normal rate and regular rhythm.      Pulses: Intact distal pulses.      Heart sounds: Normal heart sounds. No murmur heard.  Pulmonary:      Effort: Pulmonary effort is normal. No respiratory distress.      Breath sounds: Normal breath sounds. No wheezing or  rales.   Chest:      Chest wall: No tenderness.   Abdominal:      General: Bowel sounds are normal. There is no distension.      Palpations: Abdomen is soft. There is no mass.      Tenderness: There is no abdominal tenderness. There is no guarding or rebound.   Musculoskeletal:         General: No tenderness or edema. Normal range of motion.      Cervical back: Normal range of motion and neck supple.   Lymphadenopathy:      Cervical: No cervical adenopathy.   Skin:     General: Skin is warm and dry.      Findings: No erythema or rash.   Neurological:      Mental Status: He is alert and oriented to person, place, and time.      Cranial Nerves: No cranial nerve deficit.      Motor: No abnormal muscle tone.      Coordination: Coordination normal.      Deep Tendon Reflexes: Reflexes are normal and symmetric. Reflexes normal.      Comments: Mild tremors of upper extremities (improving)    Psychiatric:         Mood and Affect: Mood and affect normal.         Thought Content: Thought content normal.         Judgment: Judgment normal.          Significant Labs: All pertinent labs within the past 24 hours have been reviewed.    Significant Imaging: I have reviewed all pertinent imaging results/findings within the past 24 hours.

## 2022-02-05 NOTE — HOSPITAL COURSE
Patient admitted for alcohol intoxication and withdrawal. Patient was started on CIWA with PRN and scheduled benzo. Currently on valium taper. Finished taper and he is overall improving. Feels much better now and is stable / ready for discharge. PCP in one week. Alcohol cessation highly endorsed.

## 2022-02-05 NOTE — PROGRESS NOTES
[facility] Medicine  Progress Note    Patient Name: Sampson Cook  MRN: [id number]  Patient Class: IP- Inpatient   Admission Date: 2/3/2022  Length of Stay: 1 days  Attending Physician: James Marmolejo MD  Primary Care Provider: LUZ Noyola        Subjective:     Principal Problem:Alcohol withdrawal syndrome with complication        HPI:  Mr. Cook is a 36 year old male with PMHx of T2DM, tobabacco use, and alcohol abuse who presented to ED requesting alcohol detox. Patient states he has a long history of alcoholism and drinks about 2 fifth of hard liquor daily. He reports waking up in the morning needing to drink to avoid tremors. Patient reports his last drink about 3 pm yesterday when he decided to quit and seek detox. His roommate drove him to hospital. While in ED patient developed tremors and tachycardia consistent with his prior episodes of alcohol withdrawal. Patient notes he recently got fired from his job as  due to drinking problem and he is tired of drinking. He was admitted to hospital in December for withdrawal and eloped 2 days later due to anxiety per patient. He failed rehab in the past. Patient denies seizure, SI/HI. He endorses smoking cigarettes 1 PPD, but denies IVDU or other illicit drug use.     ED course: patient with tremors and tachycardic HR ~120. He received valium 10 mg IV x. 2.       Overview/Hospital Course:  Patient admitted for alcohol intoxication and withdrawal. Patient was started on CIWA with PRN and scheduled benzo. Currently on valium taper. Overall stable.      Interval History: No acute events overnight. HDS and afebrile. No new complaints this AM. Feels like he is improving gradually. Tremors also better. Remains on valium taper , plan to start taper tomorrow. Had a long conversation about his drinking habits and what he can do to improve. He doesn't seem depressed but no so this he has been bored and this has been his habit for  the past 15 years.     Review of Systems   Constitutional: Negative for activity change, appetite change, chills, diaphoresis, fatigue, fever and unexpected weight change.   HENT: Negative for congestion, dental problem, drooling, ear discharge, ear pain, facial swelling, hearing loss, mouth sores, nosebleeds, postnasal drip, rhinorrhea, sinus pressure, sneezing, sore throat, tinnitus, trouble swallowing and voice change.    Eyes: Negative for photophobia, pain, discharge, redness, itching and visual disturbance.   Respiratory: Negative for apnea, cough, choking, chest tightness, shortness of breath, wheezing and stridor.    Cardiovascular: Negative for chest pain, palpitations and leg swelling.   Gastrointestinal: Negative for abdominal distention, abdominal pain, anal bleeding, blood in stool, constipation, diarrhea, nausea, rectal pain and vomiting.   Endocrine: Negative for cold intolerance, heat intolerance, polydipsia, polyphagia and polyuria.   Genitourinary: Negative for decreased urine volume, difficulty urinating, dysuria, enuresis, flank pain, frequency, genital sores, hematuria, penile discharge, penile pain, penile swelling, scrotal swelling, testicular pain and urgency.   Musculoskeletal: Negative for arthralgias, back pain, gait problem, joint swelling, myalgias, neck pain and neck stiffness.   Skin: Negative for color change, pallor, rash and wound.   Allergic/Immunologic: Negative for environmental allergies, food allergies and immunocompromised state.   Neurological: Positive for tremors. Negative for dizziness, seizures, syncope, facial asymmetry, speech difficulty, weakness, light-headedness, numbness and headaches.   Hematological: Negative for adenopathy. Does not bruise/bleed easily.   Psychiatric/Behavioral: Negative for agitation, behavioral problems, confusion, decreased concentration, dysphoric mood, hallucinations, self-injury, sleep disturbance and suicidal ideas. The patient is  nervous/anxious. The patient is not hyperactive.        Objective:     Vital Signs (Most Recent):  Temp: 97 °F (36.1 °C) (02/05/22 0742)  Pulse: 95 (02/05/22 0742)  Resp: 16 (02/05/22 0742)  BP: (!) 154/91 (02/05/22 0742)  SpO2: (!) 91 % (02/05/22 0742) Vital Signs (24h Range):  Temp:  [96.1 °F (35.6 °C)-98.8 °F (37.1 °C)] 97 °F (36.1 °C)  Pulse:  [] 95  Resp:  [16-18] 16  SpO2:  [91 %-99 %] 91 %  BP: (141-167)/() 154/91     Weight: 86.2 kg (190 lb)  Body mass index is 26.5 kg/m².    Intake/Output Summary (Last 24 hours) at 2/5/2022 1219  Last data filed at 2/5/2022 0700  Gross per 24 hour   Intake 800 ml   Output 700 ml   Net 100 ml      Physical Exam  Constitutional:       General: He is not in acute distress.     Appearance: He is well-developed and well-nourished. He is not diaphoretic.   HENT:      Head: Normocephalic and atraumatic.      Nose: Nose normal.      Mouth/Throat:      Mouth: Oropharynx is clear and moist.      Pharynx: No oropharyngeal exudate.   Eyes:      General: No scleral icterus.     Extraocular Movements: EOM normal.      Conjunctiva/sclera: Conjunctivae normal.      Pupils: Pupils are equal, round, and reactive to light.   Neck:      Thyroid: No thyromegaly.      Vascular: No JVD.      Trachea: No tracheal deviation.   Cardiovascular:      Rate and Rhythm: Normal rate and regular rhythm.      Pulses: Intact distal pulses.      Heart sounds: Normal heart sounds. No murmur heard.  Pulmonary:      Effort: Pulmonary effort is normal. No respiratory distress.      Breath sounds: Normal breath sounds. No wheezing or rales.   Chest:      Chest wall: No tenderness.   Abdominal:      General: Bowel sounds are normal. There is no distension.      Palpations: Abdomen is soft. There is no mass.      Tenderness: There is no abdominal tenderness. There is no guarding or rebound.   Musculoskeletal:         General: No tenderness or edema. Normal range of motion.      Cervical back: Normal  range of motion and neck supple.   Lymphadenopathy:      Cervical: No cervical adenopathy.   Skin:     General: Skin is warm and dry.      Findings: No erythema or rash.   Neurological:      Mental Status: He is alert and oriented to person, place, and time.      Cranial Nerves: No cranial nerve deficit.      Motor: No abnormal muscle tone.      Coordination: Coordination normal.      Deep Tendon Reflexes: Reflexes are normal and symmetric. Reflexes normal.      Comments: Mild tremors of upper extremities (improving)    Psychiatric:         Mood and Affect: Mood and affect normal.         Thought Content: Thought content normal.         Judgment: Judgment normal.          Significant Labs: All pertinent labs within the past 24 hours have been reviewed.    Significant Imaging: I have reviewed all pertinent imaging results/findings within the past 24 hours.      Assessment/Plan:      * Alcohol withdrawal syndrome with complication  -presented with tremors and anxiety consistent with alcohol withdrawal   -recurrent as patient was admitted to hospital in December and eloped prior completion of detox   -drinks 2 fifth of whisky daily and last drinks earlier on admission date  -ETOH level elevated 263 and UDS negative   -denies withdrawal seizure   -received valium 10 mg IV x 2 in ED for tachycardia and tremors related to ETOH withdrawal   -will start on valium 10 mg PO tid with taper as patient is high risk for withdrawal   -ativan IV 1 mg prn CIWA > 8-15 and 2mg IV for >15  -thiamine and folate daily         Type 2 diabetes mellitus with hyperglycemia, without long-term current use of insulin  - appears to be on metformin at home   - needing insulin inpatient , continue for now  - accuchecks, LDSSI and hypoglycemia protocol  - Ha1c 7.1      Tobacco use  -counseled about health benefit of cessation       Alcohol use disorder, severe, dependence  -long history of alcoholism   -seen by addiction psychiatry during last  admission   -counseled complete cessation   -outpatient detox if patient is ready         VTE Risk Mitigation (From admission, onward)         Ordered     IP VTE LOW RISK PATIENT  Once         02/04/22 0152     Place sequential compression device  Until discontinued         02/04/22 0152                Discharge Planning   MARCIE: 2/7/2022     Code Status: Full Code   Is the patient medically ready for discharge?: No    Reason for patient still in hospital (select all that apply): Patient trending condition             James Marmolejo MD  Department of Hospital Medicine   [facility]

## 2022-02-05 NOTE — ASSESSMENT & PLAN NOTE
- appears to be on metformin at home   - needing insulin inpatient , continue for now  - accuchecks, LDSSI and hypoglycemia protocol  - Ha1c 7.1

## 2022-02-06 LAB
ALBUMIN SERPL BCP-MCNC: 3.7 G/DL (ref 3.5–5.2)
ALP SERPL-CCNC: 44 U/L (ref 55–135)
ALT SERPL W/O P-5'-P-CCNC: 49 U/L (ref 10–44)
ANION GAP SERPL CALC-SCNC: 12 MMOL/L (ref 8–16)
AST SERPL-CCNC: 43 U/L (ref 10–40)
BASOPHILS # BLD AUTO: 0.04 K/UL (ref 0–0.2)
BASOPHILS NFR BLD: 0.7 % (ref 0–1.9)
BILIRUB SERPL-MCNC: 0.7 MG/DL (ref 0.1–1)
BUN SERPL-MCNC: 18 MG/DL (ref 6–20)
CALCIUM SERPL-MCNC: 9.5 MG/DL (ref 8.7–10.5)
CHLORIDE SERPL-SCNC: 102 MMOL/L (ref 95–110)
CO2 SERPL-SCNC: 21 MMOL/L (ref 23–29)
CREAT SERPL-MCNC: 0.7 MG/DL (ref 0.5–1.4)
DIFFERENTIAL METHOD: ABNORMAL
EOSINOPHIL # BLD AUTO: 0.4 K/UL (ref 0–0.5)
EOSINOPHIL NFR BLD: 6.3 % (ref 0–8)
ERYTHROCYTE [DISTWIDTH] IN BLOOD BY AUTOMATED COUNT: 13.1 % (ref 11.5–14.5)
EST. GFR  (AFRICAN AMERICAN): >60 ML/MIN/1.73 M^2
EST. GFR  (NON AFRICAN AMERICAN): >60 ML/MIN/1.73 M^2
GLUCOSE SERPL-MCNC: 143 MG/DL (ref 70–110)
HCT VFR BLD AUTO: 45.6 % (ref 40–54)
HGB BLD-MCNC: 15.5 G/DL (ref 14–18)
IMM GRANULOCYTES # BLD AUTO: 0.03 K/UL (ref 0–0.04)
IMM GRANULOCYTES NFR BLD AUTO: 0.5 % (ref 0–0.5)
LYMPHOCYTES # BLD AUTO: 1.9 K/UL (ref 1–4.8)
LYMPHOCYTES NFR BLD: 31.2 % (ref 18–48)
MAGNESIUM SERPL-MCNC: 2.1 MG/DL (ref 1.6–2.6)
MCH RBC QN AUTO: 28.9 PG (ref 27–31)
MCHC RBC AUTO-ENTMCNC: 34 G/DL (ref 32–36)
MCV RBC AUTO: 85 FL (ref 82–98)
MONOCYTES # BLD AUTO: 0.7 K/UL (ref 0.3–1)
MONOCYTES NFR BLD: 10.7 % (ref 4–15)
NEUTROPHILS # BLD AUTO: 3.1 K/UL (ref 1.8–7.7)
NEUTROPHILS NFR BLD: 50.6 % (ref 38–73)
NRBC BLD-RTO: 0 /100 WBC
PHOSPHATE SERPL-MCNC: 4.6 MG/DL (ref 2.7–4.5)
PLATELET # BLD AUTO: 118 K/UL (ref 150–450)
PMV BLD AUTO: 10.4 FL (ref 9.2–12.9)
POCT GLUCOSE: 149 MG/DL (ref 70–110)
POCT GLUCOSE: 150 MG/DL (ref 70–110)
POCT GLUCOSE: 192 MG/DL (ref 70–110)
POCT GLUCOSE: 193 MG/DL (ref 70–110)
POTASSIUM SERPL-SCNC: 3.6 MMOL/L (ref 3.5–5.1)
PROT SERPL-MCNC: 6.7 G/DL (ref 6–8.4)
RBC # BLD AUTO: 5.36 M/UL (ref 4.6–6.2)
SODIUM SERPL-SCNC: 135 MMOL/L (ref 136–145)
WBC # BLD AUTO: 6.15 K/UL (ref 3.9–12.7)

## 2022-02-06 PROCEDURE — 25000003 PHARM REV CODE 250: Performed by: INTERNAL MEDICINE

## 2022-02-06 PROCEDURE — 25000003 PHARM REV CODE 250: Performed by: HOSPITALIST

## 2022-02-06 PROCEDURE — 83735 ASSAY OF MAGNESIUM: CPT | Performed by: INTERNAL MEDICINE

## 2022-02-06 PROCEDURE — 99233 PR SUBSEQUENT HOSPITAL CARE,LEVL III: ICD-10-PCS | Mod: ,,, | Performed by: INTERNAL MEDICINE

## 2022-02-06 PROCEDURE — 85025 COMPLETE CBC W/AUTO DIFF WBC: CPT | Performed by: INTERNAL MEDICINE

## 2022-02-06 PROCEDURE — 84100 ASSAY OF PHOSPHORUS: CPT | Performed by: INTERNAL MEDICINE

## 2022-02-06 PROCEDURE — 36415 COLL VENOUS BLD VENIPUNCTURE: CPT | Performed by: INTERNAL MEDICINE

## 2022-02-06 PROCEDURE — 11000001 HC ACUTE MED/SURG PRIVATE ROOM

## 2022-02-06 PROCEDURE — 99233 SBSQ HOSP IP/OBS HIGH 50: CPT | Mod: ,,, | Performed by: INTERNAL MEDICINE

## 2022-02-06 PROCEDURE — 80053 COMPREHEN METABOLIC PANEL: CPT | Performed by: INTERNAL MEDICINE

## 2022-02-06 RX ORDER — LISINOPRIL 10 MG/1
10 TABLET ORAL DAILY
Status: DISCONTINUED | OUTPATIENT
Start: 2022-02-06 | End: 2022-02-07 | Stop reason: HOSPADM

## 2022-02-06 RX ORDER — DIAZEPAM 5 MG/1
5 TABLET ORAL 3 TIMES DAILY
Status: DISCONTINUED | OUTPATIENT
Start: 2022-02-06 | End: 2022-02-07

## 2022-02-06 RX ADMIN — DIAZEPAM 10 MG: 5 TABLET ORAL at 08:02

## 2022-02-06 RX ADMIN — Medication 6 MG: at 09:02

## 2022-02-06 RX ADMIN — CETIRIZINE HYDROCHLORIDE 10 MG: 10 TABLET, FILM COATED ORAL at 08:02

## 2022-02-06 RX ADMIN — Medication 100 MG: at 08:02

## 2022-02-06 RX ADMIN — FOLIC ACID 1 MG: 1 TABLET ORAL at 08:02

## 2022-02-06 RX ADMIN — ESCITALOPRAM OXALATE 10 MG: 5 TABLET, FILM COATED ORAL at 08:02

## 2022-02-06 RX ADMIN — INSULIN DETEMIR 15 UNITS: 100 INJECTION, SOLUTION SUBCUTANEOUS at 09:02

## 2022-02-06 RX ADMIN — DIAZEPAM 5 MG: 5 TABLET ORAL at 03:02

## 2022-02-06 RX ADMIN — DIAZEPAM 5 MG: 5 TABLET ORAL at 08:02

## 2022-02-06 RX ADMIN — THERA TABS 1 TABLET: TAB at 08:02

## 2022-02-06 NOTE — SUBJECTIVE & OBJECTIVE
Interval History: No acute events overnight. HDS and afebrile. No new complaints this AM. Feels like he is improving gradually. Tremors improving. Remains on valium taper. Normal appetite now , eating well. Normal BM.    Review of Systems:   Constitutional: Negative for activity change, appetite change, chills, diaphoresis, fatigue, fever and unexpected weight change.   HENT: Negative for congestion, dental problem, drooling, ear discharge, ear pain, facial swelling, hearing loss, mouth sores, nosebleeds, postnasal drip, rhinorrhea, sinus pressure, sneezing, sore throat, tinnitus, trouble swallowing and voice change.    Eyes: Negative for photophobia, pain, discharge, redness, itching and visual disturbance.   Respiratory: Negative for apnea, cough, choking, chest tightness, shortness of breath, wheezing and stridor.    Cardiovascular: Negative for chest pain, palpitations and leg swelling.   Gastrointestinal: Negative for abdominal distention, abdominal pain, anal bleeding, blood in stool, constipation, diarrhea, nausea, rectal pain and vomiting.   Endocrine: Negative for cold intolerance, heat intolerance, polydipsia, polyphagia and polyuria.   Genitourinary: Negative for decreased urine volume, difficulty urinating, dysuria, enuresis, flank pain, frequency, genital sores, hematuria, penile discharge, penile pain, penile swelling, scrotal swelling, testicular pain and urgency.   Musculoskeletal: Negative for arthralgias, back pain, gait problem, joint swelling, myalgias, neck pain and neck stiffness.   Skin: Negative for color change, pallor, rash and wound.   Allergic/Immunologic: Negative for environmental allergies, food allergies and immunocompromised state.   Neurological: Positive for tremors. Negative for dizziness, seizures, syncope, facial asymmetry, speech difficulty, weakness, light-headedness, numbness and headaches.   Hematological: Negative for adenopathy. Does not bruise/bleed easily.    Psychiatric/Behavioral: Negative for agitation, behavioral problems, confusion, decreased concentration, dysphoric mood, hallucinations, self-injury, sleep disturbance and suicidal ideas. The patient is nervous/anxious. The patient is not hyperactive.        Objective:     Vital Signs (Most Recent):  Temp: 97.3 °F (36.3 °C) (02/06/22 1143)  Pulse: 86 (02/06/22 1143)  Resp: 20 (02/06/22 1143)  BP: (!) 138/96 (02/06/22 1143)  SpO2: 96 % (02/06/22 1143) Vital Signs (24h Range):  Temp:  [96.3 °F (35.7 °C)-98.2 °F (36.8 °C)] 97.3 °F (36.3 °C)  Pulse:  [] 86  Resp:  [15-20] 20  SpO2:  [94 %-97 %] 96 %  BP: (137-139)/() 138/96     Weight: 86.2 kg (190 lb)  Body mass index is 26.5 kg/m².  No intake or output data in the 24 hours ending 02/06/22 1159   Physical Exam  Constitutional:       General: He is not in acute distress.     Appearance: He is well-developed and well-nourished. He is not diaphoretic.   HENT:      Head: Normocephalic and atraumatic.      Nose: Nose normal.      Mouth/Throat:      Mouth: Oropharynx is clear and moist.      Pharynx: No oropharyngeal exudate.   Eyes:      General: No scleral icterus.     Extraocular Movements: EOM normal.      Conjunctiva/sclera: Conjunctivae normal.      Pupils: Pupils are equal, round, and reactive to light.   Neck:      Thyroid: No thyromegaly.      Vascular: No JVD.      Trachea: No tracheal deviation.   Cardiovascular:      Rate and Rhythm: Normal rate and regular rhythm.      Pulses: Intact distal pulses.      Heart sounds: Normal heart sounds. No murmur heard.  Pulmonary:      Effort: Pulmonary effort is normal. No respiratory distress.      Breath sounds: Normal breath sounds. No wheezing or rales.   Chest:      Chest wall: No tenderness.   Abdominal:      General: Bowel sounds are normal. There is no distension.      Palpations: Abdomen is soft. There is no mass.      Tenderness: There is no abdominal tenderness. There is no guarding or rebound.    Musculoskeletal:         General: No tenderness or edema. Normal range of motion.      Cervical back: Normal range of motion and neck supple.   Lymphadenopathy:      Cervical: No cervical adenopathy.   Skin:     General: Skin is warm and dry.      Findings: No erythema or rash.   Neurological:      Mental Status: He is alert and oriented to person, place, and time.      Cranial Nerves: No cranial nerve deficit.      Motor: No abnormal muscle tone.      Coordination: Coordination normal.      Deep Tendon Reflexes: Reflexes are normal and symmetric. Reflexes normal.      Comments: Improving tremors of upper extremities (mild)    Psychiatric:         Mood and Affect: Mood and affect normal.         Thought Content: Thought content normal.         Judgment: Judgment normal.          Significant Labs: All pertinent labs within the past 24 hours have been reviewed.    Significant Imaging: I have reviewed all pertinent imaging results/findings within the past 24 hours.

## 2022-02-06 NOTE — PROGRESS NOTES
[facility] Medicine  Progress Note    Patient Name: Sampson Cook  MRN: [id number]  Patient Class: IP- Inpatient   Admission Date: 2/3/2022  Length of Stay: 2 days  Attending Physician: James Marmolejo MD  Primary Care Provider: LUZ Noyola        Subjective:     Principal Problem:Alcohol withdrawal syndrome with complication        HPI:  Mr. Cook is a 36 year old male with PMHx of T2DM, tobabacco use, and alcohol abuse who presented to ED requesting alcohol detox. Patient states he has a long history of alcoholism and drinks about 2 fifth of hard liquor daily. He reports waking up in the morning needing to drink to avoid tremors. Patient reports his last drink about 3 pm yesterday when he decided to quit and seek detox. His roommate drove him to hospital. While in ED patient developed tremors and tachycardia consistent with his prior episodes of alcohol withdrawal. Patient notes he recently got fired from his job as  due to drinking problem and he is tired of drinking. He was admitted to hospital in December for withdrawal and eloped 2 days later due to anxiety per patient. He failed rehab in the past. Patient denies seizure, SI/HI. He endorses smoking cigarettes 1 PPD, but denies IVDU or other illicit drug use.     ED course: patient with tremors and tachycardic HR ~120. He received valium 10 mg IV x. 2.       Overview/Hospital Course:  Patient admitted for alcohol intoxication and withdrawal. Patient was started on CIWA with PRN and scheduled benzo. Currently on valium taper. Overall improving.      Interval History: No acute events overnight. HDS and afebrile. No new complaints this AM. Feels like he is improving gradually. Tremors improving. Remains on valium taper. Normal appetite now , eating well. Normal BM.    Review of Systems:   Constitutional: Negative for activity change, appetite change, chills, diaphoresis, fatigue, fever and unexpected weight change.    HENT: Negative for congestion, dental problem, drooling, ear discharge, ear pain, facial swelling, hearing loss, mouth sores, nosebleeds, postnasal drip, rhinorrhea, sinus pressure, sneezing, sore throat, tinnitus, trouble swallowing and voice change.    Eyes: Negative for photophobia, pain, discharge, redness, itching and visual disturbance.   Respiratory: Negative for apnea, cough, choking, chest tightness, shortness of breath, wheezing and stridor.    Cardiovascular: Negative for chest pain, palpitations and leg swelling.   Gastrointestinal: Negative for abdominal distention, abdominal pain, anal bleeding, blood in stool, constipation, diarrhea, nausea, rectal pain and vomiting.   Endocrine: Negative for cold intolerance, heat intolerance, polydipsia, polyphagia and polyuria.   Genitourinary: Negative for decreased urine volume, difficulty urinating, dysuria, enuresis, flank pain, frequency, genital sores, hematuria, penile discharge, penile pain, penile swelling, scrotal swelling, testicular pain and urgency.   Musculoskeletal: Negative for arthralgias, back pain, gait problem, joint swelling, myalgias, neck pain and neck stiffness.   Skin: Negative for color change, pallor, rash and wound.   Allergic/Immunologic: Negative for environmental allergies, food allergies and immunocompromised state.   Neurological: Positive for tremors. Negative for dizziness, seizures, syncope, facial asymmetry, speech difficulty, weakness, light-headedness, numbness and headaches.   Hematological: Negative for adenopathy. Does not bruise/bleed easily.   Psychiatric/Behavioral: Negative for agitation, behavioral problems, confusion, decreased concentration, dysphoric mood, hallucinations, self-injury, sleep disturbance and suicidal ideas. The patient is nervous/anxious. The patient is not hyperactive.        Objective:     Vital Signs (Most Recent):  Temp: 97.3 °F (36.3 °C) (02/06/22 1143)  Pulse: 86 (02/06/22 1143)  Resp: 20  (02/06/22 1143)  BP: (!) 138/96 (02/06/22 1143)  SpO2: 96 % (02/06/22 1143) Vital Signs (24h Range):  Temp:  [96.3 °F (35.7 °C)-98.2 °F (36.8 °C)] 97.3 °F (36.3 °C)  Pulse:  [] 86  Resp:  [15-20] 20  SpO2:  [94 %-97 %] 96 %  BP: (137-139)/() 138/96     Weight: 86.2 kg (190 lb)  Body mass index is 26.5 kg/m².  No intake or output data in the 24 hours ending 02/06/22 1159   Physical Exam  Constitutional:       General: He is not in acute distress.     Appearance: He is well-developed and well-nourished. He is not diaphoretic.   HENT:      Head: Normocephalic and atraumatic.      Nose: Nose normal.      Mouth/Throat:      Mouth: Oropharynx is clear and moist.      Pharynx: No oropharyngeal exudate.   Eyes:      General: No scleral icterus.     Extraocular Movements: EOM normal.      Conjunctiva/sclera: Conjunctivae normal.      Pupils: Pupils are equal, round, and reactive to light.   Neck:      Thyroid: No thyromegaly.      Vascular: No JVD.      Trachea: No tracheal deviation.   Cardiovascular:      Rate and Rhythm: Normal rate and regular rhythm.      Pulses: Intact distal pulses.      Heart sounds: Normal heart sounds. No murmur heard.  Pulmonary:      Effort: Pulmonary effort is normal. No respiratory distress.      Breath sounds: Normal breath sounds. No wheezing or rales.   Chest:      Chest wall: No tenderness.   Abdominal:      General: Bowel sounds are normal. There is no distension.      Palpations: Abdomen is soft. There is no mass.      Tenderness: There is no abdominal tenderness. There is no guarding or rebound.   Musculoskeletal:         General: No tenderness or edema. Normal range of motion.      Cervical back: Normal range of motion and neck supple.   Lymphadenopathy:      Cervical: No cervical adenopathy.   Skin:     General: Skin is warm and dry.      Findings: No erythema or rash.   Neurological:      Mental Status: He is alert and oriented to person, place, and time.      Cranial  Nerves: No cranial nerve deficit.      Motor: No abnormal muscle tone.      Coordination: Coordination normal.      Deep Tendon Reflexes: Reflexes are normal and symmetric. Reflexes normal.      Comments: Improving tremors of upper extremities (mild)    Psychiatric:         Mood and Affect: Mood and affect normal.         Thought Content: Thought content normal.         Judgment: Judgment normal.          Significant Labs: All pertinent labs within the past 24 hours have been reviewed.    Significant Imaging: I have reviewed all pertinent imaging results/findings within the past 24 hours.        Assessment/Plan:      * Alcohol withdrawal syndrome with complication  -presented with tremors and anxiety consistent with alcohol withdrawal   -recurrent as patient was admitted to hospital in December and eloped prior completion of detox   -drinks 2 fifth of whisky daily and last drinks earlier on admission date  -ETOH level elevated 263 and UDS negative   -denies withdrawal seizure   -received valium 10 mg IV x 2 in ED for tachycardia and tremors related to ETOH withdrawal   -will start on valium 10 mg PO tid with taper as patient is high risk for withdrawal , now on 5 mg TID  -ativan IV 1 mg prn CIWA > 8-15 and 2mg IV for >15  -thiamine and folate daily         Type 2 diabetes mellitus with hyperglycemia, without long-term current use of insulin  - appears to be on metformin at home   - needing insulin inpatient , continue for now  - accuchecks, LDSSI and hypoglycemia protocol  - Ha1c 7.1      Tobacco use  -counseled about health benefit of cessation       Alcohol use disorder, severe, dependence  -long history of alcoholism   -seen by addiction psychiatry during last admission   -counseled complete cessation   -outpatient detox if patient is ready         VTE Risk Mitigation (From admission, onward)         Ordered     IP VTE LOW RISK PATIENT  Once         02/04/22 0152     Place sequential compression device  Until  discontinued         02/04/22 0152                Discharge Planning   MARCIE: 2/7/2022     Code Status: Full Code   Is the patient medically ready for discharge?: No    Reason for patient still in hospital (select all that apply): Patient unstable           James Marmolejo MD  Department of Hospital Medicine   [facility]

## 2022-02-06 NOTE — ASSESSMENT & PLAN NOTE
-presented with tremors and anxiety consistent with alcohol withdrawal   -recurrent as patient was admitted to hospital in December and eloped prior completion of detox   -drinks 2 fifth of whisky daily and last drinks earlier on admission date  -ETOH level elevated 263 and UDS negative   -denies withdrawal seizure   -received valium 10 mg IV x 2 in ED for tachycardia and tremors related to ETOH withdrawal   -will start on valium 10 mg PO tid with taper as patient is high risk for withdrawal , now on 5 mg TID  -ativan IV 1 mg prn CIWA > 8-15 and 2mg IV for >15  -thiamine and folate daily

## 2022-02-07 VITALS
HEIGHT: 71 IN | RESPIRATION RATE: 16 BRPM | HEART RATE: 105 BPM | OXYGEN SATURATION: 98 % | WEIGHT: 190 LBS | BODY MASS INDEX: 26.6 KG/M2 | DIASTOLIC BLOOD PRESSURE: 99 MMHG | SYSTOLIC BLOOD PRESSURE: 131 MMHG | TEMPERATURE: 97 F

## 2022-02-07 LAB
ALBUMIN SERPL BCP-MCNC: 3.5 G/DL (ref 3.5–5.2)
ALP SERPL-CCNC: 40 U/L (ref 55–135)
ALT SERPL W/O P-5'-P-CCNC: 59 U/L (ref 10–44)
ANION GAP SERPL CALC-SCNC: 11 MMOL/L (ref 8–16)
AST SERPL-CCNC: 42 U/L (ref 10–40)
BASOPHILS # BLD AUTO: 0.05 K/UL (ref 0–0.2)
BASOPHILS NFR BLD: 0.7 % (ref 0–1.9)
BILIRUB SERPL-MCNC: 0.5 MG/DL (ref 0.1–1)
BUN SERPL-MCNC: 20 MG/DL (ref 6–20)
CALCIUM SERPL-MCNC: 9.4 MG/DL (ref 8.7–10.5)
CHLORIDE SERPL-SCNC: 104 MMOL/L (ref 95–110)
CO2 SERPL-SCNC: 22 MMOL/L (ref 23–29)
CREAT SERPL-MCNC: 0.7 MG/DL (ref 0.5–1.4)
DIFFERENTIAL METHOD: ABNORMAL
EOSINOPHIL # BLD AUTO: 0.4 K/UL (ref 0–0.5)
EOSINOPHIL NFR BLD: 5.6 % (ref 0–8)
ERYTHROCYTE [DISTWIDTH] IN BLOOD BY AUTOMATED COUNT: 13.2 % (ref 11.5–14.5)
EST. GFR  (AFRICAN AMERICAN): >60 ML/MIN/1.73 M^2
EST. GFR  (NON AFRICAN AMERICAN): >60 ML/MIN/1.73 M^2
GLUCOSE SERPL-MCNC: 143 MG/DL (ref 70–110)
HCT VFR BLD AUTO: 44.9 % (ref 40–54)
HGB BLD-MCNC: 14.9 G/DL (ref 14–18)
IMM GRANULOCYTES # BLD AUTO: 0.02 K/UL (ref 0–0.04)
IMM GRANULOCYTES NFR BLD AUTO: 0.3 % (ref 0–0.5)
LYMPHOCYTES # BLD AUTO: 2.5 K/UL (ref 1–4.8)
LYMPHOCYTES NFR BLD: 36.3 % (ref 18–48)
MAGNESIUM SERPL-MCNC: 2.1 MG/DL (ref 1.6–2.6)
MCH RBC QN AUTO: 28.8 PG (ref 27–31)
MCHC RBC AUTO-ENTMCNC: 33.2 G/DL (ref 32–36)
MCV RBC AUTO: 87 FL (ref 82–98)
MONOCYTES # BLD AUTO: 0.8 K/UL (ref 0.3–1)
MONOCYTES NFR BLD: 12.1 % (ref 4–15)
NEUTROPHILS # BLD AUTO: 3 K/UL (ref 1.8–7.7)
NEUTROPHILS NFR BLD: 45 % (ref 38–73)
NRBC BLD-RTO: 0 /100 WBC
PHOSPHATE SERPL-MCNC: 4.6 MG/DL (ref 2.7–4.5)
PLATELET # BLD AUTO: 145 K/UL (ref 150–450)
PMV BLD AUTO: 10.6 FL (ref 9.2–12.9)
POCT GLUCOSE: 151 MG/DL (ref 70–110)
POCT GLUCOSE: 186 MG/DL (ref 70–110)
POTASSIUM SERPL-SCNC: 3.6 MMOL/L (ref 3.5–5.1)
PROT SERPL-MCNC: 6.5 G/DL (ref 6–8.4)
RBC # BLD AUTO: 5.18 M/UL (ref 4.6–6.2)
SODIUM SERPL-SCNC: 137 MMOL/L (ref 136–145)
WBC # BLD AUTO: 6.75 K/UL (ref 3.9–12.7)

## 2022-02-07 PROCEDURE — 99239 HOSP IP/OBS DSCHRG MGMT >30: CPT | Mod: ,,, | Performed by: INTERNAL MEDICINE

## 2022-02-07 PROCEDURE — 25000003 PHARM REV CODE 250: Performed by: INTERNAL MEDICINE

## 2022-02-07 PROCEDURE — 99239 PR HOSPITAL DISCHARGE DAY,>30 MIN: ICD-10-PCS | Mod: ,,, | Performed by: INTERNAL MEDICINE

## 2022-02-07 PROCEDURE — 83735 ASSAY OF MAGNESIUM: CPT | Performed by: INTERNAL MEDICINE

## 2022-02-07 PROCEDURE — 36415 COLL VENOUS BLD VENIPUNCTURE: CPT | Performed by: INTERNAL MEDICINE

## 2022-02-07 PROCEDURE — 84100 ASSAY OF PHOSPHORUS: CPT | Performed by: INTERNAL MEDICINE

## 2022-02-07 PROCEDURE — 80053 COMPREHEN METABOLIC PANEL: CPT | Performed by: INTERNAL MEDICINE

## 2022-02-07 PROCEDURE — 25000003 PHARM REV CODE 250: Performed by: HOSPITALIST

## 2022-02-07 PROCEDURE — 85025 COMPLETE CBC W/AUTO DIFF WBC: CPT | Performed by: INTERNAL MEDICINE

## 2022-02-07 RX ORDER — LISINOPRIL 20 MG/1
20 TABLET ORAL DAILY
Qty: 90 TABLET | Refills: 3 | Status: SHIPPED | OUTPATIENT
Start: 2022-02-08 | End: 2023-02-08

## 2022-02-07 RX ORDER — DIAZEPAM 5 MG/1
5 TABLET ORAL 2 TIMES DAILY
Status: DISCONTINUED | OUTPATIENT
Start: 2022-02-07 | End: 2022-02-07 | Stop reason: HOSPADM

## 2022-02-07 RX ADMIN — DIAZEPAM 5 MG: 5 TABLET ORAL at 08:02

## 2022-02-07 RX ADMIN — ESCITALOPRAM OXALATE 10 MG: 5 TABLET, FILM COATED ORAL at 08:02

## 2022-02-07 RX ADMIN — LISINOPRIL 10 MG: 10 TABLET ORAL at 08:02

## 2022-02-07 RX ADMIN — THERA TABS 1 TABLET: TAB at 08:02

## 2022-02-07 RX ADMIN — Medication 100 MG: at 08:02

## 2022-02-07 RX ADMIN — CETIRIZINE HYDROCHLORIDE 10 MG: 10 TABLET, FILM COATED ORAL at 08:02

## 2022-02-07 RX ADMIN — FOLIC ACID 1 MG: 1 TABLET ORAL at 08:02

## 2022-02-07 NOTE — NURSING
0706 Pt in bed A&O x4 resp even and unlabored, no s/s distress/ discomfort, no anxiety or tremors noted at this time, able to get up ad lamberto, communication board updated    0807 Pt provided fresh Ice water per request, scheduled meds given, denies any needs    1351 Pt set up with a lift ride, IV and Visi removed, received meds at bedside, discharge instructions provided with follow up appointment scheduled, pt voiced understanding

## 2022-02-07 NOTE — PLAN OF CARE
Set up Lyft ride for patient, for 2:00 PM, to be picked up at front of hospital, main entrance.    Lorraine Bae, [place]  [phone]

## 2022-02-07 NOTE — PLAN OF CARE
[facility]  Discharge Final Note    Primary Care Provider: LUZ Noyola  Expected Discharge Date: 2/7/2022    Patient medically ready for discharge to home.  Nurse can call report to n/a.  Transportation yes per Lyft.   Is family/patient aware of discharge yes - patient.  Hospital follow up scheduled, yes, in AVS.  Final Discharge Note (most recent)       Final Note - 02/07/22 1356          Final Note    Assessment Type Final Discharge Note (P)      Anticipated Discharge Disposition Home or Self Care (P)      What phone number can be called within the next 1-3 days to see how you are doing after discharge? [phone] (P)      Hospital Resources/Appts/Education Provided Appointments scheduled and added to AVS (P)         Post-Acute Status    Post-Acute Authorization Other (P)      Coverage [facility] Medicaid (P)      Other Status No Post-Acute Service Needs (P)      Discharge Delays None known at this time (P)                      Important Message from Medicare         Referral Info (most recent)       Referral Info    No documentation.                 Contact Info       LUZ Noyola   Specialty: General Practice   Relationship: PCP - General    [address] [place] [address]   Phone: [phone]       Next Steps: Schedule an appointment as soon as possible for a visit in 1 week(s)    Instructions: Post hospital follow up          Future Appointments   Date Time Provider Department Center   2/14/2022  2:15 PM Elvin Nuno MD [facility]  Case Management  Ext: [phone]  02/07/2022

## 2022-02-07 NOTE — PLAN OF CARE
Problem: Adult Inpatient Plan of Care  Goal: Patient-Specific Goal (Individualized)  Outcome: Ongoing, Not Progressing  Goal: Absence of Hospital-Acquired Illness or Injury  Intervention: Identify and Manage Fall Risk  Flowsheets (Taken 2/6/2022 1828)  Safety Promotion/Fall Prevention:   assistive device/personal item within reach   side rails raised x 3   lighting adjusted   medications reviewed  Goal: Optimal Comfort and Wellbeing  Outcome: Ongoing, Progressing  Intervention: Monitor Pain and Promote Comfort  Flowsheets (Taken 2/6/2022 1828)  Pain Management Interventions: relaxation techniques promoted     Problem: Adult Inpatient Plan of Care  Goal: Optimal Comfort and Wellbeing  Outcome: Ongoing, Progressing  Intervention: Monitor Pain and Promote Comfort  Flowsheets (Taken 2/6/2022 1828)  Pain Management Interventions: relaxation techniques promoted   Patient in bed resting quietly, easily awaken to verbal stimuli, no signs of distress noted, CIWA 6, will continue to monitor.

## 2022-02-07 NOTE — PLAN OF CARE
Pt is AAOx4. Pt remain free from fall and injuries. VS remain stable. Mild tremor noted. No other C/O through out the shift.  Questions and concerns voiced and answered. Medication compliance. Call light in reach. Bed in low locked position. Side rails x2. Belongings at bedside.

## 2022-02-07 NOTE — PLAN OF CARE
[facility]  Initial Discharge Assessment       Primary Care Provider: LUZ Noyola    Admission Diagnosis: Chest pain [R07.9]  Alcohol withdrawal syndrome with perceptual disturbance [F10.232]    Admission Date: 2/3/2022  Expected Discharge Date: 2/7/2022    Discharge Barriers Identified: None    Payor: MEDICAID / Plan: [facility] COMMUNITY PLAN [facility] [employer] ([place] MEDICAID) / Product Type: Managed Medicaid /     Extended Emergency Contact Information  Primary Emergency Contact: Sandy Cook  Mobile Phone: [phone]  Relation: Mother  Preferred language: English   needed? No    Discharge Plan A: Home  Discharge Plan B: Home      [id number] - [place], [place] - [address]  [address]  [facility] [address]  Phone: [phone] Fax: [phone]      Initial Assessment (most recent)     Adult Discharge Assessment - 02/07/22 1110        Discharge Assessment    Assessment Type Discharge Planning Assessment     Confirmed/corrected address, phone number and insurance Yes     Confirmed Demographics Correct on Facesheet     Source of Information patient     Does patient/caregiver understand observation status Yes     Communicated MARCIE with patient/caregiver Date not available/Unable to determine     Reason For Admission Alcohol withdrawal syndrome with complication     Lives With friend(s)     Facility Arrived From: Home     Do you expect to return to your current living situation? Yes     Do you have help at home or someone to help you manage your care at home? No     Prior to hospitilization cognitive status: Alert/Oriented     Current cognitive status: Alert/Oriented     Walking or Climbing Stairs Difficulty none     Dressing/Bathing Difficulty none     Equipment Currently Used at Home none     Readmission within 30 days? No     Patient currently being followed by outpatient case management? No     Do you currently  "have service(s) that help you manage your care at home? No     Do you take prescription medications? Yes     Do you have prescription coverage? Yes     Coverage [facility] Medicaid [employer]     Do you have any problems affording any of your prescribed medications? No     Is the patient taking medications as prescribed? yes     Who is going to help you get home at discharge? Friend or Lyft services, depending what time he is DC.     How do you get to doctors appointments? family or friend will provide     Are you on dialysis? No     Do you take coumadin? No     Discharge Plan A Home     Discharge Plan B Home     DME Needed Upon Discharge  other (see comments)   TBD    Discharge Plan discussed with: Patient     Discharge Barriers Identified None        Relationship/Environment    Name(s) of Who Lives With Patient Did not provide name, just "roommate"                  SW met with patient in room for Discharge Planning Assessment.  Patient was able to answer questions.  Per patient, he lives with a roommate in a single story residence with 22 step(s) to enter.   Per patient, he was independent with ADLS and used no for ambulation.  Patient will have assistance from roommate and self upon discharge.  All questions addressed.  Assigned SW/CM will follow for needs.    Patient also reported that he was attempting to change his PCP to one at [facility]. Contacted [facility] and set up follow up with first available PCP.    Lorraine Bae, JHONATAN  [phone]           "

## 2022-02-07 NOTE — DISCHARGE SUMMARY
[facility] Medicine  Discharge Summary      Patient Name: Sampson Cook  MRN: [id number]  Patient Class: IP- Inpatient  Admission Date: 2/3/2022  Hospital Length of Stay: 3 days  Discharge Date and Time:  02/07/2022  Attending Physician: James Marmolejo MD   Discharging Provider: James Marmolejo MD  Primary Care Provider: Thais Ahuja APRMOISES  [facility] Medicine Team: [facility] MED G James Marmolejo MD    HPI:   Mr. Cook is a 36 year old male with PMHx of T2DM, tobabacco use, and alcohol abuse who presented to ED requesting alcohol detox. Patient states he has a long history of alcoholism and drinks about 2 fifth of hard liquor daily. He reports waking up in the morning needing to drink to avoid tremors. Patient reports his last drink about 3 pm yesterday when he decided to quit and seek detox. His roommate drove him to hospital. While in ED patient developed tremors and tachycardia consistent with his prior episodes of alcohol withdrawal. Patient notes he recently got fired from his job as  due to drinking problem and he is tired of drinking. He was admitted to hospital in December for withdrawal and eloped 2 days later due to anxiety per patient. He failed rehab in the past. Patient denies seizure, SI/HI. He endorses smoking cigarettes 1 PPD, but denies IVDU or other illicit drug use.     ED course: patient with tremors and tachycardic HR ~120. He received valium 10 mg IV x. 2.       * No surgery found *      Hospital Course:   Patient admitted for alcohol intoxication and withdrawal. Patient was started on CIWA with PRN and scheduled benzo. Currently on valium taper. Finished taper and he is overall improving. Feels much better now and is stable / ready for discharge. PCP in one week. Alcohol cessation highly endorsed.      Unvaccinated. He refused covid vaccine.      Goals of Care Treatment Preferences:  Code Status: Full Code      Consults:     No new Assessment & Plan notes  have been filed under this hospital service since the last note was generated.  Service: Hospital Medicine    Final Active Diagnoses:    Diagnosis Date Noted POA    PRINCIPAL PROBLEM:  Alcohol withdrawal syndrome with complication [F10.239] 12/09/2021 Yes    Type 2 diabetes mellitus with hyperglycemia, without long-term current use of insulin [E11.65] 02/04/2022 Yes    Tobacco use [Z72.0]  Yes    Alcohol use disorder, severe, dependence [F10.20] 12/08/2021 Yes     Chronic      Problems Resolved During this Admission:    Diagnosis Date Noted Date Resolved POA    Type 2 diabetes mellitus, without long-term current use of insulin [E11.9]  02/05/2022 Yes       Discharged Condition: stable    Disposition:     Follow Up:   Follow-up Information     LUZ Noyola. Schedule an appointment as soon as possible for a visit in 1 week.    Specialty: General Practice  Why: Post hospital follow up  Contact information:  [address] [address]  [phone]                       Patient Instructions:   No discharge procedures on file.    Significant Diagnostic Studies: all reviewed     Pending Diagnostic Studies:     None         Medications:  Reconciled Home Medications:      Medication List      START taking these medications    lisinopriL 20 MG tablet  Commonly known as: PRINIVIL,ZESTRIL  Take 1 tablet (20 mg total) by mouth once daily.  Start taking on: February 8, 2022     multivitamin Tab  Take 1 tablet by mouth once daily.  Start taking on: February 8, 2022        CONTINUE taking these medications    folic acid 1 MG tablet  Commonly known as: FOLVITE  Take 1,000 mcg by mouth once daily.     ibuprofen 200 MG tablet  Commonly known as: ADVIL,MOTRIN  Take 600 mg by mouth every 6 (six) hours as needed for Pain.     metFORMIN 500 MG tablet  Commonly known as: GLUCOPHAGE  Take 1,000 mg by mouth 2 (two) times a day.     thiamine 50 MG tablet  Commonly known as: VITAMIN B-1  Take 50 mg by  mouth once daily.            Indwelling Lines/Drains at time of discharge:   Lines/Drains/Airways     None                 Time spent on the discharge of patient: > 30 minutes         James Marmolejo MD  Department of Hospital Medicine  [facility]

## 2022-03-28 ENCOUNTER — HOSPITAL ENCOUNTER (INPATIENT)
Facility: HOSPITAL | Age: 37
LOS: 4 days | Discharge: LEFT BEFORE DISCHARGE | DRG: 894 | End: 2022-04-01
Attending: EMERGENCY MEDICINE | Admitting: EMERGENCY MEDICINE
Payer: MEDICAID

## 2022-03-28 DIAGNOSIS — F10.939 ALCOHOL WITHDRAWAL SYNDROME WITH COMPLICATION: ICD-10-CM

## 2022-03-28 DIAGNOSIS — F10.20 ALCOHOL USE DISORDER, SEVERE, DEPENDENCE: Chronic | ICD-10-CM

## 2022-03-28 DIAGNOSIS — F10.10 ALCOHOL ABUSE: ICD-10-CM

## 2022-03-28 DIAGNOSIS — K92.0 HEMATEMESIS WITH NAUSEA: ICD-10-CM

## 2022-03-28 DIAGNOSIS — M79.672 LEFT FOOT PAIN: ICD-10-CM

## 2022-03-28 DIAGNOSIS — R74.01 TRANSAMINITIS: ICD-10-CM

## 2022-03-28 DIAGNOSIS — F17.200 TOBACCO USE DISORDER: Chronic | ICD-10-CM

## 2022-03-28 DIAGNOSIS — R07.9 CHEST PAIN: ICD-10-CM

## 2022-03-28 DIAGNOSIS — F10.929 ALCOHOL INTOXICATION: ICD-10-CM

## 2022-03-28 DIAGNOSIS — E87.29 ALCOHOLIC KETOACIDOSIS: Primary | ICD-10-CM

## 2022-03-28 LAB
ALBUMIN SERPL BCP-MCNC: 4.1 G/DL (ref 3.5–5.2)
ALP SERPL-CCNC: 58 U/L (ref 55–135)
ALT SERPL W/O P-5'-P-CCNC: 44 U/L (ref 10–44)
ANION GAP SERPL CALC-SCNC: 19 MMOL/L (ref 8–16)
AST SERPL-CCNC: 65 U/L (ref 10–40)
BACTERIA #/AREA URNS AUTO: NORMAL /HPF
BILIRUB SERPL-MCNC: 0.9 MG/DL (ref 0.1–1)
BILIRUB UR QL STRIP: NEGATIVE
BUN SERPL-MCNC: 12 MG/DL (ref 6–20)
CALCIUM SERPL-MCNC: 9 MG/DL (ref 8.7–10.5)
CHLORIDE SERPL-SCNC: 95 MMOL/L (ref 95–110)
CLARITY UR REFRACT.AUTO: CLEAR
CO2 SERPL-SCNC: 24 MMOL/L (ref 23–29)
COLOR UR AUTO: YELLOW
CREAT SERPL-MCNC: 0.7 MG/DL (ref 0.5–1.4)
ERYTHROCYTE [DISTWIDTH] IN BLOOD BY AUTOMATED COUNT: 13 % (ref 11.5–14.5)
EST. GFR  (AFRICAN AMERICAN): >60 ML/MIN/1.73 M^2
EST. GFR  (NON AFRICAN AMERICAN): >60 ML/MIN/1.73 M^2
ETHANOL SERPL-MCNC: 351 MG/DL
GLUCOSE SERPL-MCNC: 232 MG/DL (ref 70–110)
GLUCOSE UR QL STRIP: ABNORMAL
HCT VFR BLD AUTO: 42.9 % (ref 40–54)
HGB BLD-MCNC: 15 G/DL (ref 14–18)
HGB UR QL STRIP: ABNORMAL
KETONES UR QL STRIP: ABNORMAL
LEUKOCYTE ESTERASE UR QL STRIP: NEGATIVE
LIPASE SERPL-CCNC: 68 U/L (ref 4–60)
MCH RBC QN AUTO: 28.3 PG (ref 27–31)
MCHC RBC AUTO-ENTMCNC: 35 G/DL (ref 32–36)
MCV RBC AUTO: 81 FL (ref 82–98)
MICROSCOPIC COMMENT: NORMAL
NITRITE UR QL STRIP: NEGATIVE
OB PNL GAST: POSITIVE
PH UR STRIP: 6 [PH] (ref 5–8)
PLATELET # BLD AUTO: 130 K/UL (ref 150–450)
PMV BLD AUTO: 10.2 FL (ref 9.2–12.9)
POCT GLUCOSE: 219 MG/DL (ref 70–110)
POTASSIUM SERPL-SCNC: 3 MMOL/L (ref 3.5–5.1)
PROT SERPL-MCNC: 7.5 G/DL (ref 6–8.4)
PROT UR QL STRIP: NEGATIVE
RBC # BLD AUTO: 5.3 M/UL (ref 4.6–6.2)
RBC #/AREA URNS AUTO: 2 /HPF (ref 0–4)
SODIUM SERPL-SCNC: 138 MMOL/L (ref 136–145)
SP GR UR STRIP: >=1.03 (ref 1–1.03)
SQUAMOUS #/AREA URNS AUTO: 1 /HPF
URN SPEC COLLECT METH UR: ABNORMAL
WBC # BLD AUTO: 7.58 K/UL (ref 3.9–12.7)
WBC #/AREA URNS AUTO: 1 /HPF (ref 0–5)
YEAST UR QL AUTO: NORMAL

## 2022-03-28 PROCEDURE — 12000002 HC ACUTE/MED SURGE SEMI-PRIVATE ROOM

## 2022-03-28 PROCEDURE — 99285 EMERGENCY DEPT VISIT HI MDM: CPT | Mod: CS,,, | Performed by: EMERGENCY MEDICINE

## 2022-03-28 PROCEDURE — 63600175 PHARM REV CODE 636 W HCPCS: Performed by: EMERGENCY MEDICINE

## 2022-03-28 PROCEDURE — 99285 EMERGENCY DEPT VISIT HI MDM: CPT | Mod: 25

## 2022-03-28 PROCEDURE — 25000003 PHARM REV CODE 250: Performed by: EMERGENCY MEDICINE

## 2022-03-28 PROCEDURE — 93010 ELECTROCARDIOGRAM REPORT: CPT | Mod: ,,, | Performed by: INTERNAL MEDICINE

## 2022-03-28 PROCEDURE — 93005 ELECTROCARDIOGRAM TRACING: CPT

## 2022-03-28 PROCEDURE — 85027 COMPLETE CBC AUTOMATED: CPT | Performed by: EMERGENCY MEDICINE

## 2022-03-28 PROCEDURE — 96375 TX/PRO/DX INJ NEW DRUG ADDON: CPT

## 2022-03-28 PROCEDURE — 81001 URINALYSIS AUTO W/SCOPE: CPT | Performed by: EMERGENCY MEDICINE

## 2022-03-28 PROCEDURE — 80053 COMPREHEN METABOLIC PANEL: CPT | Performed by: EMERGENCY MEDICINE

## 2022-03-28 PROCEDURE — 80307 DRUG TEST PRSMV CHEM ANLYZR: CPT | Performed by: EMERGENCY MEDICINE

## 2022-03-28 PROCEDURE — 96374 THER/PROPH/DIAG INJ IV PUSH: CPT

## 2022-03-28 PROCEDURE — 83690 ASSAY OF LIPASE: CPT | Performed by: EMERGENCY MEDICINE

## 2022-03-28 PROCEDURE — 99285 PR EMERGENCY DEPT VISIT,LEVEL V: ICD-10-PCS | Mod: CS,,, | Performed by: EMERGENCY MEDICINE

## 2022-03-28 PROCEDURE — 82271 OCCULT BLOOD OTHER SOURCES: CPT | Performed by: EMERGENCY MEDICINE

## 2022-03-28 PROCEDURE — 83735 ASSAY OF MAGNESIUM: CPT | Performed by: EMERGENCY MEDICINE

## 2022-03-28 PROCEDURE — 82962 GLUCOSE BLOOD TEST: CPT

## 2022-03-28 PROCEDURE — 96361 HYDRATE IV INFUSION ADD-ON: CPT

## 2022-03-28 PROCEDURE — 93010 EKG 12-LEAD: ICD-10-PCS | Mod: ,,, | Performed by: INTERNAL MEDICINE

## 2022-03-28 PROCEDURE — 82077 ASSAY SPEC XCP UR&BREATH IA: CPT | Performed by: EMERGENCY MEDICINE

## 2022-03-28 RX ORDER — DROPERIDOL 2.5 MG/ML
1.25 INJECTION, SOLUTION INTRAMUSCULAR; INTRAVENOUS
Status: COMPLETED | OUTPATIENT
Start: 2022-03-28 | End: 2022-03-28

## 2022-03-28 RX ORDER — IBUPROFEN 600 MG/1
600 TABLET ORAL EVERY 6 HOURS PRN
Qty: 20 TABLET | Refills: 0 | Status: SHIPPED | OUTPATIENT
Start: 2022-03-28 | End: 2022-03-28 | Stop reason: SDUPTHER

## 2022-03-28 RX ORDER — IBUPROFEN 600 MG/1
600 TABLET ORAL EVERY 6 HOURS PRN
Qty: 20 TABLET | Refills: 0 | Status: SHIPPED | OUTPATIENT
Start: 2022-03-28 | End: 2022-04-02

## 2022-03-28 RX ORDER — LORAZEPAM 1 MG/1
1 TABLET ORAL
Status: COMPLETED | OUTPATIENT
Start: 2022-03-28 | End: 2022-03-28

## 2022-03-28 RX ORDER — ONDANSETRON 2 MG/ML
4 INJECTION INTRAMUSCULAR; INTRAVENOUS
Status: COMPLETED | OUTPATIENT
Start: 2022-03-28 | End: 2022-03-28

## 2022-03-28 RX ORDER — POTASSIUM CHLORIDE 20 MEQ/1
40 TABLET, EXTENDED RELEASE ORAL ONCE
Status: COMPLETED | OUTPATIENT
Start: 2022-03-29 | End: 2022-03-28

## 2022-03-28 RX ORDER — DROPERIDOL 2.5 MG/ML
0.62 INJECTION, SOLUTION INTRAMUSCULAR; INTRAVENOUS
Status: DISCONTINUED | OUTPATIENT
Start: 2022-03-28 | End: 2022-03-28

## 2022-03-28 RX ADMIN — LORAZEPAM 1 MG: 1 TABLET ORAL at 10:03

## 2022-03-28 RX ADMIN — SODIUM CHLORIDE 1000 ML: 0.9 INJECTION, SOLUTION INTRAVENOUS at 09:03

## 2022-03-28 RX ADMIN — ONDANSETRON 4 MG: 2 INJECTION INTRAMUSCULAR; INTRAVENOUS at 08:03

## 2022-03-28 RX ADMIN — POTASSIUM CHLORIDE 40 MEQ: 1500 TABLET, EXTENDED RELEASE ORAL at 11:03

## 2022-03-28 RX ADMIN — SODIUM CHLORIDE 500 ML: 0.9 INJECTION, SOLUTION INTRAVENOUS at 08:03

## 2022-03-28 RX ADMIN — DROPERIDOL 1.25 MG: 2.5 INJECTION, SOLUTION INTRAMUSCULAR; INTRAVENOUS at 09:03

## 2022-03-28 NOTE — ED PROVIDER NOTES
"Encounter Date: 3/28/2022       History     Chief Complaint   Patient presents with    Alcohol Intoxication     Drank whole bottle of bourbon due to leg injury, broken lower leg week ago , has cast on , denies suicidal /homicidal /ideation     Sampson Cook is a 37 y.o. male who  has a past medical history of Alcoholism, Diabetes mellitus, and Tobacco use.    The patient presents to the ED due to alcohol intoxication. On arrival he is requesting rehab placement.  He admits he is an alcoholic.  He states his roommate brought him here to detox.  He admits he drinks a gallon of alcohol daily, and last drank a few hours ago.  He also requests pain medication for his foot.  He was recently seen in the ER and diagnosed with a fracture, and states he was never given any medication for pain. He has not tried any ibuprofen or tylenol because he didn't know he could take anything.  He states he has not been able to see an orthopedist because he does not have a ride, however, he does reiterate his roommate brought him here today.    He appears intoxicated but in no distress. He is requesting water repeatedly. He is otherwise a poor historian, and additional history obtained via chart review.        Review of patient's allergies indicates:  No Known Allergies  Past Medical History:   Diagnosis Date    Alcoholism     Diabetes mellitus     Tobacco use      No past surgical history on file.  No family history on file.  Social History     Tobacco Use    Smoking status: Current Every Day Smoker    Smokeless tobacco: Never Used   Substance Use Topics    Alcohol use: Yes     Comment: "a gallon a day"    Drug use: Not Currently     Review of Systems   Unable to perform ROS: Mental status change       Physical Exam     Initial Vitals [03/28/22 1814]   BP Pulse Resp Temp SpO2   130/73 (!) 128 20 98.4 °F (36.9 °C) 97 %      MAP       --         Physical Exam    Nursing note and vitals reviewed.  Constitutional: He appears " well-developed and well-nourished. He is not diaphoretic. No distress.   Disheveled, clinically intoxicated.    HENT:   Head: Normocephalic and atraumatic.   Mouth/Throat: Oropharynx is clear and moist.   Eyes: EOM are normal. Pupils are equal, round, and reactive to light.   Neck: No tracheal deviation present.   Cardiovascular: Regular rhythm, normal heart sounds and intact distal pulses. Tachycardia present.    HR 110s.   Pulmonary/Chest: Breath sounds normal. No stridor. No respiratory distress.   Abdominal: Abdomen is soft. He exhibits no distension and no mass. There is no abdominal tenderness.   Musculoskeletal:         General: No edema. Normal range of motion.      Comments: L foot in cast, cast is dirty, no edema, erythema, or deformity.  Brisk cap refill to LLE.      Neurological: He is alert and oriented to person, place, and time. No cranial nerve deficit or sensory deficit.   Skin: Skin is warm and dry. Capillary refill takes less than 2 seconds. No rash noted.   Psychiatric: He has a normal mood and affect. His behavior is normal. Thought content normal.         ED Course   Procedures  Labs Reviewed   CBC WITHOUT DIFFERENTIAL - Abnormal; Notable for the following components:       Result Value    MCV 81 (*)     Platelets 130 (*)     All other components within normal limits   COMPREHENSIVE METABOLIC PANEL - Abnormal; Notable for the following components:    Potassium 3.0 (*)     Glucose 232 (*)     AST 65 (*)     Anion Gap 19 (*)     All other components within normal limits   LIPASE - Abnormal; Notable for the following components:    Lipase 68 (*)     All other components within normal limits   OCCULT BLOOD, OTHER SOURCES - Abnormal; Notable for the following components:    Occult Blood Positive (*)     All other components within normal limits   ALCOHOL,MEDICAL (ETHANOL) - Abnormal; Notable for the following components:    Alcohol, Serum 351 (*)     All other components within normal limits    URINALYSIS, REFLEX TO URINE CULTURE - Abnormal; Notable for the following components:    Specific Gravity, UA >=1.030 (*)     Glucose, UA 3+ (*)     Ketones, UA 1+ (*)     Occult Blood UA 2+ (*)     All other components within normal limits    Narrative:     Specimen Source->Urine   POCT GLUCOSE - Abnormal; Notable for the following components:    POCT Glucose 219 (*)     All other components within normal limits   URINALYSIS MICROSCOPIC    Narrative:     Specimen Source->Urine   POCT GLUCOSE MONITORING CONTINUOUS     EKG Readings: (Independently Interpreted)   Initial Reading: No STEMI. Previous EKG: Compared with most recent EKG Rhythm: Sinus Tachycardia.   Sinus tach, rate 116, no ST changes or evidence of ischemia, normal intervals.  Stable from December 2021.        Imaging Results    None          Medications   ondansetron injection 4 mg (4 mg Intravenous Given 3/28/22 2001)   sodium chloride 0.9% bolus 500 mL (0 mLs Intravenous Stopped 3/28/22 2037)   droperidoL injection 1.25 mg (1.25 mg Intravenous Given 3/28/22 2127)   sodium chloride 0.9% bolus 1,000 mL (0 mLs Intravenous Stopped 3/28/22 2243)   LORazepam tablet 1 mg (1 mg Oral Given 3/28/22 2250)     Medical Decision Making:   History:   Old Medical Records: I decided to obtain old medical records.  Old Records Summarized: records from clinic visits and other records.       <> Summary of Records: Seen at [facility] 3/18, diagnosed with left 5th metatarsal fracture.  Placed in short leg cast and provided with crutches.  Instructed to follow-up in orthopedic clinic.    Patient has extensive history of alcohol abuse and frequent ED presentations for alcohol withdrawal.  Differential Diagnosis:   Differential Diagnosis includes, but is not limited to:  Bowel obstruction, incarcerated/strangulated hernia, ileus, appendicitis, cholecystitis, aspirated foreign body, esophageal food impaction, biliary colic, colitis/diverticulitis, gastroenteritis, esophagitis,  hepatitis, pancreatitis, GERD, PUD, constipation, nephrolithiasis, UTI/pyelonephritis.    Clinical Tests:   Lab Tests: Reviewed and Ordered  Medical Tests: Ordered and Reviewed  ED Management:  Labs with hypokalemia. Elevated AG, consistent with alcoholic ketoacidosis. EtOH 350.  IVF, zofran given. Patient with persistent N/V.     At shift change, patient still clinically intoxicated. Will need to sober prior to DC alone.                ED Course as of 03/28/22 2255   Mon Mar 28, 2022   1928 37-year-old male presents to ER requesting rehab.  He admits he is an alcoholic.  He states his roommate brought him here to detox.  He admits he drinks a gallon of alcohol daily, and last drink a few hours ago.  He also requests pain medication for his foot.  He was recently seen in the ER and diagnosed with a fracture.  He states he has not been able to see an orthopedist because he does not have a ride, however, he does state his roommate brought him here today.  He appears intoxicated but in no distress.  He is requesting water.  Perfusion intact to the left toes, no significant bruising, swelling, or deformity.  Patient will be provided with outpatient rehab resources.  He was also provided with a prescription for NSAIDs. [SS]   2024 Notified by nurse that patient is now actively vomiting and requesting water.  Will obtain labs, give IV fluids and Zofran, and reassess. [SS]      ED Course User Index  [SS] Mk Montano MD             Clinical Impression:   Final diagnoses:  [F10.929] Alcohol intoxication  [F10.10] Alcohol abuse (Primary)  [M79.672] Left foot pain  [K92.0] Hematemesis with nausea  [E87.2] Alcoholic ketoacidosis                 Mk Montano MD  03/28/22 7359

## 2022-03-28 NOTE — ED NOTES
"Two patient identifiers have been checked and are correct.      Pt to ED c/o pain to LLE. Pt reports fractured foot last week and discharged with no pain meds and has not followed up with ortho. Cast in place to right lower extremity. Pt reports drank "a gallon of whiskey" today which is normal for him. Pt reports nausea, emesis bag provided.     Appearance: Pt awake, alert & oriented to person, place & time. Pt appeared dirty.   Skin: Skin warm, dry & intact. Color consistent with ethnicity. Mucous membranes moist. No breakdown or brusing noted.   Musculoskeletal: Patient moving all extremities well, no obvious swelling or deformities noted.   Respiratory: Respirations spontaneous, even, and non-labored. Visible chest rise noted. Airway is open and patent. No accessory muscle use noted.   Neurologic: Sensation is intact. Speech is clear and appropriate. Eyes open spontaneously, behavior appropriate to situation, follows commands, facial expression symmetrical, bilateral hand grasp equal and even, purposeful motor response noted.  Cardiac: All peripheral pulses present. No Bilateral lower extremity edema. Cap refill is <3 seconds.  Abdomen: Abdomen soft, non-tender to palpation. +nuasea.   : Pt reports no dysuria or hematuria.             "

## 2022-03-29 PROBLEM — E87.29 ALCOHOLIC KETOACIDOSIS: Status: ACTIVE | Noted: 2022-03-29

## 2022-03-29 PROBLEM — I10 HTN (HYPERTENSION): Status: ACTIVE | Noted: 2022-03-29

## 2022-03-29 PROBLEM — R74.01 TRANSAMINITIS: Status: ACTIVE | Noted: 2022-03-29

## 2022-03-29 LAB
AMPHET+METHAMPHET UR QL: NEGATIVE
AMPHET+METHAMPHET UR QL: NEGATIVE
B-OH-BUTYR BLD STRIP-SCNC: 2.9 MMOL/L (ref 0–0.5)
BARBITURATES UR QL SCN>200 NG/ML: NEGATIVE
BARBITURATES UR QL SCN>200 NG/ML: NEGATIVE
BENZODIAZ UR QL SCN>200 NG/ML: ABNORMAL
BENZODIAZ UR QL SCN>200 NG/ML: NEGATIVE
BZE UR QL SCN: NEGATIVE
BZE UR QL SCN: NEGATIVE
CANNABINOIDS UR QL SCN: NEGATIVE
CANNABINOIDS UR QL SCN: NEGATIVE
CREAT UR-MCNC: 68 MG/DL (ref 23–375)
CREAT UR-MCNC: 97 MG/DL (ref 23–375)
CTP QC/QA: YES
ETHANOL UR-MCNC: 29 MG/DL
ETHANOL UR-MCNC: 333 MG/DL
MAGNESIUM SERPL-MCNC: 2.5 MG/DL (ref 1.6–2.6)
METHADONE UR QL SCN>300 NG/ML: NEGATIVE
METHADONE UR QL SCN>300 NG/ML: NEGATIVE
OPIATES UR QL SCN: NEGATIVE
OPIATES UR QL SCN: NEGATIVE
PCP UR QL SCN>25 NG/ML: NEGATIVE
PCP UR QL SCN>25 NG/ML: NEGATIVE
PHOSPHATE SERPL-MCNC: 2.6 MG/DL (ref 2.7–4.5)
PHOSPHATE SERPL-MCNC: 2.7 MG/DL (ref 2.7–4.5)
POCT GLUCOSE: 154 MG/DL (ref 70–110)
POCT GLUCOSE: 158 MG/DL (ref 70–110)
SARS-COV-2 RDRP RESP QL NAA+PROBE: NEGATIVE
TOXICOLOGY INFORMATION: ABNORMAL
TOXICOLOGY INFORMATION: ABNORMAL

## 2022-03-29 PROCEDURE — 63600175 PHARM REV CODE 636 W HCPCS: Performed by: EMERGENCY MEDICINE

## 2022-03-29 PROCEDURE — 63600175 PHARM REV CODE 636 W HCPCS: Performed by: STUDENT IN AN ORGANIZED HEALTH CARE EDUCATION/TRAINING PROGRAM

## 2022-03-29 PROCEDURE — 96375 TX/PRO/DX INJ NEW DRUG ADDON: CPT

## 2022-03-29 PROCEDURE — 25000003 PHARM REV CODE 250: Performed by: EMERGENCY MEDICINE

## 2022-03-29 PROCEDURE — 84100 ASSAY OF PHOSPHORUS: CPT | Mod: 91 | Performed by: HOSPITALIST

## 2022-03-29 PROCEDURE — 25000003 PHARM REV CODE 250

## 2022-03-29 PROCEDURE — 99223 PR INITIAL HOSPITAL CARE,LEVL III: ICD-10-PCS | Mod: AF,HB,, | Performed by: PSYCHIATRY & NEUROLOGY

## 2022-03-29 PROCEDURE — 36415 COLL VENOUS BLD VENIPUNCTURE: CPT | Performed by: HOSPITALIST

## 2022-03-29 PROCEDURE — 99223 1ST HOSP IP/OBS HIGH 75: CPT | Mod: AF,HB,, | Performed by: PSYCHIATRY & NEUROLOGY

## 2022-03-29 PROCEDURE — 80307 DRUG TEST PRSMV CHEM ANLYZR: CPT | Performed by: HOSPITALIST

## 2022-03-29 PROCEDURE — 63600175 PHARM REV CODE 636 W HCPCS

## 2022-03-29 PROCEDURE — 99223 1ST HOSP IP/OBS HIGH 75: CPT | Mod: ,,, | Performed by: INTERNAL MEDICINE

## 2022-03-29 PROCEDURE — 25000003 PHARM REV CODE 250: Performed by: STUDENT IN AN ORGANIZED HEALTH CARE EDUCATION/TRAINING PROGRAM

## 2022-03-29 PROCEDURE — 94761 N-INVAS EAR/PLS OXIMETRY MLT: CPT

## 2022-03-29 PROCEDURE — 20600001 HC STEP DOWN PRIVATE ROOM

## 2022-03-29 PROCEDURE — U0002 COVID-19 LAB TEST NON-CDC: HCPCS | Performed by: EMERGENCY MEDICINE

## 2022-03-29 PROCEDURE — 84100 ASSAY OF PHOSPHORUS: CPT

## 2022-03-29 PROCEDURE — 25000003 PHARM REV CODE 250: Performed by: HOSPITALIST

## 2022-03-29 PROCEDURE — 82010 KETONE BODYS QUAN: CPT | Performed by: HOSPITALIST

## 2022-03-29 PROCEDURE — 99223 PR INITIAL HOSPITAL CARE,LEVL III: ICD-10-PCS | Mod: ,,, | Performed by: INTERNAL MEDICINE

## 2022-03-29 PROCEDURE — S4991 NICOTINE PATCH NONLEGEND: HCPCS

## 2022-03-29 RX ORDER — ONDANSETRON 2 MG/ML
4 INJECTION INTRAMUSCULAR; INTRAVENOUS EVERY 8 HOURS PRN
Status: DISCONTINUED | OUTPATIENT
Start: 2022-03-29 | End: 2022-04-01 | Stop reason: HOSPADM

## 2022-03-29 RX ORDER — DEXTROSE MONOHYDRATE AND SODIUM CHLORIDE 5; .9 G/100ML; G/100ML
INJECTION, SOLUTION INTRAVENOUS CONTINUOUS
Status: ACTIVE | OUTPATIENT
Start: 2022-03-29 | End: 2022-03-29

## 2022-03-29 RX ORDER — IBUPROFEN 200 MG
200 TABLET ORAL EVERY 6 HOURS PRN
COMMUNITY

## 2022-03-29 RX ORDER — FOLIC ACID 1 MG/1
1 TABLET ORAL DAILY
Status: DISCONTINUED | OUTPATIENT
Start: 2022-03-29 | End: 2022-04-01 | Stop reason: HOSPADM

## 2022-03-29 RX ORDER — DIAZEPAM 5 MG/1
10 TABLET ORAL EVERY 8 HOURS
Status: DISCONTINUED | OUTPATIENT
Start: 2022-03-29 | End: 2022-03-29

## 2022-03-29 RX ORDER — SODIUM CHLORIDE 0.9 % (FLUSH) 0.9 %
10 SYRINGE (ML) INJECTION EVERY 12 HOURS PRN
Status: CANCELLED | OUTPATIENT
Start: 2022-03-29

## 2022-03-29 RX ORDER — INSULIN ASPART 100 [IU]/ML
0-5 INJECTION, SOLUTION INTRAVENOUS; SUBCUTANEOUS
Status: DISCONTINUED | OUTPATIENT
Start: 2022-03-29 | End: 2022-04-01 | Stop reason: HOSPADM

## 2022-03-29 RX ORDER — DIAZEPAM 10 MG/2ML
5 INJECTION INTRAMUSCULAR
Status: COMPLETED | OUTPATIENT
Start: 2022-03-29 | End: 2022-03-29

## 2022-03-29 RX ORDER — LORAZEPAM 0.5 MG/1
2 TABLET ORAL EVERY 4 HOURS PRN
Status: DISCONTINUED | OUTPATIENT
Start: 2022-03-29 | End: 2022-03-29

## 2022-03-29 RX ORDER — DIAZEPAM 10 MG/2ML
10 INJECTION INTRAMUSCULAR
Status: DISCONTINUED | OUTPATIENT
Start: 2022-03-29 | End: 2022-03-31

## 2022-03-29 RX ORDER — THIAMINE HCL 100 MG
100 TABLET ORAL DAILY
Status: DISCONTINUED | OUTPATIENT
Start: 2022-03-29 | End: 2022-03-29

## 2022-03-29 RX ORDER — ENOXAPARIN SODIUM 100 MG/ML
40 INJECTION SUBCUTANEOUS EVERY 24 HOURS
Status: DISCONTINUED | OUTPATIENT
Start: 2022-03-29 | End: 2022-04-01 | Stop reason: HOSPADM

## 2022-03-29 RX ORDER — IBUPROFEN 200 MG
16 TABLET ORAL
Status: DISCONTINUED | OUTPATIENT
Start: 2022-03-29 | End: 2022-04-01 | Stop reason: HOSPADM

## 2022-03-29 RX ORDER — IBUPROFEN 200 MG
24 TABLET ORAL
Status: DISCONTINUED | OUTPATIENT
Start: 2022-03-29 | End: 2022-04-01 | Stop reason: HOSPADM

## 2022-03-29 RX ORDER — SODIUM CHLORIDE, SODIUM LACTATE, POTASSIUM CHLORIDE, CALCIUM CHLORIDE 600; 310; 30; 20 MG/100ML; MG/100ML; MG/100ML; MG/100ML
INJECTION, SOLUTION INTRAVENOUS CONTINUOUS
Status: DISCONTINUED | OUTPATIENT
Start: 2022-03-29 | End: 2022-03-29

## 2022-03-29 RX ORDER — FOLIC ACID 1 MG/1
1000 TABLET ORAL DAILY
Status: DISCONTINUED | OUTPATIENT
Start: 2022-03-29 | End: 2022-03-29

## 2022-03-29 RX ORDER — GLUCAGON 1 MG
1 KIT INJECTION
Status: DISCONTINUED | OUTPATIENT
Start: 2022-03-29 | End: 2022-04-01 | Stop reason: HOSPADM

## 2022-03-29 RX ORDER — NALOXONE HCL 0.4 MG/ML
0.02 VIAL (ML) INJECTION
Status: DISCONTINUED | OUTPATIENT
Start: 2022-03-29 | End: 2022-04-01 | Stop reason: HOSPADM

## 2022-03-29 RX ORDER — IBUPROFEN 200 MG
1 TABLET ORAL DAILY
Status: DISCONTINUED | OUTPATIENT
Start: 2022-03-29 | End: 2022-04-01 | Stop reason: HOSPADM

## 2022-03-29 RX ORDER — MUPIROCIN 20 MG/G
OINTMENT TOPICAL 2 TIMES DAILY
Status: DISCONTINUED | OUTPATIENT
Start: 2022-03-29 | End: 2022-04-01 | Stop reason: HOSPADM

## 2022-03-29 RX ORDER — THIAMINE HCL 100 MG
100 TABLET ORAL DAILY
Status: DISCONTINUED | OUTPATIENT
Start: 2022-04-04 | End: 2022-04-01 | Stop reason: HOSPADM

## 2022-03-29 RX ORDER — LORAZEPAM 2 MG/ML
2 INJECTION INTRAMUSCULAR EVERY 4 HOURS PRN
Status: DISCONTINUED | OUTPATIENT
Start: 2022-03-29 | End: 2022-04-01 | Stop reason: HOSPADM

## 2022-03-29 RX ORDER — SODIUM CHLORIDE 0.9 % (FLUSH) 0.9 %
10 SYRINGE (ML) INJECTION
Status: DISCONTINUED | OUTPATIENT
Start: 2022-03-29 | End: 2022-04-01 | Stop reason: HOSPADM

## 2022-03-29 RX ORDER — TALC
6 POWDER (GRAM) TOPICAL NIGHTLY PRN
Status: DISCONTINUED | OUTPATIENT
Start: 2022-03-29 | End: 2022-04-01 | Stop reason: HOSPADM

## 2022-03-29 RX ORDER — THIAMINE HCL 50 MG
50 TABLET ORAL DAILY
Status: DISCONTINUED | OUTPATIENT
Start: 2022-03-29 | End: 2022-03-29

## 2022-03-29 RX ORDER — LORAZEPAM 2 MG/ML
2 INJECTION INTRAMUSCULAR EVERY 4 HOURS PRN
Status: DISCONTINUED | OUTPATIENT
Start: 2022-03-29 | End: 2022-03-29

## 2022-03-29 RX ADMIN — NICOTINE 1 PATCH: 14 PATCH, EXTENDED RELEASE TRANSDERMAL at 09:03

## 2022-03-29 RX ADMIN — DEXTROSE AND SODIUM CHLORIDE: 5; .9 INJECTION, SOLUTION INTRAVENOUS at 06:03

## 2022-03-29 RX ADMIN — LORAZEPAM 2 MG: 2 INJECTION INTRAMUSCULAR; INTRAVENOUS at 10:03

## 2022-03-29 RX ADMIN — LORAZEPAM 2 MG: 1 TABLET ORAL at 05:03

## 2022-03-29 RX ADMIN — THERA TABS 1 TABLET: TAB at 09:03

## 2022-03-29 RX ADMIN — MUPIROCIN: 20 OINTMENT TOPICAL at 02:03

## 2022-03-29 RX ADMIN — MUPIROCIN: 20 OINTMENT TOPICAL at 08:03

## 2022-03-29 RX ADMIN — DIAZEPAM 10 MG: 5 TABLET ORAL at 09:03

## 2022-03-29 RX ADMIN — DIAZEPAM 10 MG: 10 INJECTION, SOLUTION INTRAMUSCULAR; INTRAVENOUS at 03:03

## 2022-03-29 RX ADMIN — DIAZEPAM 5 MG: 10 INJECTION, SOLUTION INTRAMUSCULAR; INTRAVENOUS at 01:03

## 2022-03-29 RX ADMIN — FOLIC ACID 1 MG: 1 TABLET ORAL at 09:03

## 2022-03-29 RX ADMIN — ENOXAPARIN SODIUM 40 MG: 100 INJECTION SUBCUTANEOUS at 05:03

## 2022-03-29 RX ADMIN — Medication 100 MG: at 09:03

## 2022-03-29 RX ADMIN — DIAZEPAM 10 MG: 10 INJECTION, SOLUTION INTRAMUSCULAR; INTRAVENOUS at 08:03

## 2022-03-29 RX ADMIN — THIAMINE HYDROCHLORIDE 500 MG: 100 INJECTION, SOLUTION INTRAMUSCULAR; INTRAVENOUS at 10:03

## 2022-03-29 RX ADMIN — THIAMINE HYDROCHLORIDE 500 MG: 100 INJECTION, SOLUTION INTRAMUSCULAR; INTRAVENOUS at 03:03

## 2022-03-29 RX ADMIN — ONDANSETRON 4 MG: 2 INJECTION, SOLUTION INTRAMUSCULAR; INTRAVENOUS at 05:03

## 2022-03-29 RX ADMIN — THIAMINE HYDROCHLORIDE 500 MG: 100 INJECTION, SOLUTION INTRAMUSCULAR; INTRAVENOUS at 11:03

## 2022-03-29 NOTE — PROGRESS NOTES
Patient arrived to the [facility] 14 th floor about 0615 alert , anxious , trying to get out of bed , patient skin is clammy , patient is diaphoretic. Patient is monitored for seizures and fall precautions Intravenous fluid is in progress as ordered @ 100 ml /hr skin is intact . Patient has a Left cast , cast is presented dirty . Report is given to the incoming nurse patient shows no signs of shortness
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
## 2022-03-29 NOTE — NURSING
Latest Reference Range & Units 03/28/22 22:00   Alcohol, Urine <10 mg/dL 333 ! [1]   !: Data is abnormal  [1] *Critical value notification by   with confirmation of receipt to    Mar[place]ell     This urine lab result was reported to me today 3/29/22 @ 1546 by male lab attendant, but noted for yesterday 3/28/22@ 2200 urine results. I did not work yesterday. These results were probably noted on ordered date and time by lab attendant. Urine was recollected on this patient and sent to lab, verified with Charge Nurse correct requition and Pt. label prior to sending to lab for correct date and time to be resulted.

## 2022-03-29 NOTE — ASSESSMENT & PLAN NOTE
ASSESSMENT:     DIAGNOSES & PROBLEMS  Alcohol use disorder, severe      STRENGTHS AND LIABILITIES   Strength: Patient accepts guidance/feedback      MOTIVATION TO PURSUE RECOVERY: moderate    ACCEPTANCE OF ADDICTION: fair    ABILITY TO ADHERE TO TREATMENT PLAN: moderate      PLAN:       MANAGEMENT PLAN, TREATMENT GOALS, THERAPEUTIC TECHNIQUES/APPROACHES & CLINICAL REASONING    Recommend increase valium to 10 mg q6hrs IV with IV ativan PRN q4hrs for CIWA>8.  Please obtain Utox    · Patient counseled on abstinence from alcohol and substances of abuse (illicit and prescription).  · Additional workup planned to address substance use disorder, in order to guide and refine ongoing management options, includes serial alcohol and drug laboratory testing (e.g. PETH, urine toxicology).  · Relapse prevention and motivational interviewing provided.  · Education provided on 12 step recovery programs.      PRESCRIPTION DRUG MANAGEMENT  - The risks and benefits of medication were discussed with this patient.  - Possible expectable adverse effects of any current or proposed individual psychotropic agents were discussed with this patient.  - Counseling was provided on the importance of full compliance with medication regimens.      In cases of emergency, daily coverage provided by Acute/ER Psych MD, NP, or SW, with contact numbers located in [facility] On Call Schedule    Case discussed with staff addiction psychiatrist: ELIJAH BEAVER MD

## 2022-03-29 NOTE — ASSESSMENT & PLAN NOTE
Patient notes that he drinks 1 gallon of alcohol a day. Patient interested in getting help with detox. He is interested in seeing addiction psychiatry when brought up to him. Concern for withdrawal, physical exam showing tremors and he is tachycardic. 5mg IV diazepam given in the ED. Patient recently treated for alcohol withdrawal in feb 2022.    Plan:  --CIWA protocol  --10mg diazepam PO TID--plan to taper  --PRN ativan 2mg PO for CIWA greater than 8  --seizure precautions  --Fall precautions  --Q4h vital signs--look for tachycardia  --Addiction psych consulted  --Thiamine, folate, multivitamins

## 2022-03-29 NOTE — ED NOTES
Patient incessantly asking every worker that walks by for water, after vomiting blood. Patient educated on need to remain NPO due to bloody emesis. Specimen sent to lab for testing. Patient cooperative with infusion of fluids.

## 2022-03-29 NOTE — PHARMACY MED REC
"Admission Medication History     The home medication history was taken by Allison Reis.    You may go to "Admission" then "Reconcile Home Medications" tabs to review and/or act upon these items.      The home medication list has been updated by the Pharmacy department.    Please read ALL comments highlighted in yellow.    Please address this information as you see fit.     Feel free to contact us if you have any questions or require assistance.        Medications listed below were obtained from: Patient/family  PTA Medications   Medication Sig    folic acid (FOLVITE) 1 MG tablet Take 1,000 mcg by mouth once daily.    ibuprofen (ADVIL,MOTRIN) 200 MG tablet Take 200 mg by mouth every 6 (six) hours as needed for Pain.    lisinopriL (PRINIVIL,ZESTRIL) 20 MG tablet Take 1 tablet (20 mg total) by mouth once daily.    metFORMIN (GLUCOPHAGE) 500 MG tablet Take 1,000 mg by mouth 2 (two) times a day.    multivitamin Tab Take 1 tablet by mouth once daily.    thiamine (VITAMIN B-1) 50 MG tablet Take 50 mg by mouth once daily.       Potential issues to be addressed PRIOR TO DISCHARGE  PATIENT MAY NEED REFILL FOR FOLIC ACID 1 MG(LAST FILL DATE 10/30/21 FOR A 90 DAY SUPPLY)    Allison Reis  EXT [id number]                  .          "

## 2022-03-29 NOTE — PLAN OF CARE
[facility] - [facility] ([place] [address])  Initial Discharge Assessment       Primary Care Provider: LUZ Noyola    Admission Diagnosis: Alcoholic ketoacidosis [E87.2]  Alcohol intoxication [F10.929]  Alcohol abuse [F10.10]  Left foot pain [M79.672]  Chest pain [R07.9]  Hematemesis with nausea [K92.0]    Admission Date: 3/28/2022  Expected Discharge Date:     Discharge Barriers Identified: (P) None    Payor: MEDICAID / Plan: [facility] COMMUNITY PLAN [facility] ([place] MEDICAID[place] / Product Type: Managed Medicaid /     Extended Emergency Contact Information  Primary Emergency Contact: Sandy Cook  Mobile Phone: [phone]  Relation: Mother  Preferred language: English   needed? No    Discharge Plan A: (P) Home  Discharge Plan B: (P) [facility]      [id number] [place], [place] - [address]  [address]  [place] [address]  Phone: [phone] Fax: [phone]      Initial Assessment (most recent)       Adult Discharge Assessment - 03/29/22 1021          Discharge Assessment    Assessment Type Discharge Planning Assessment (P)      Confirmed/corrected address, phone number and insurance Yes (P)      Confirmed Demographics Correct on Facesheet (P)      Source of Information family (P)      If unable to respond/provide information was family/caregiver contacted? Yes (P)      Contact Name/Number mother Pisano, [phone] (P)      Does patient/caregiver understand observation status Yes (P)      Communicated MARCIE with patient/caregiver Yes (P)      Reason For Admission alcohol use disorder (P)      Lives With other (see comments) (P)    lives with a roomate    Facility Arrived From: HOME (P)      Do you expect to return to your current living situation? Yes (P)      Do you have help at home or someone to help you manage your care at home? Yes (P)      Who are your caregiver(s) and their phone number(s)? Sandy  Joey, mother, [phone] (P)      Prior to hospitilization cognitive status: Alert/Oriented (P)      Current cognitive status: Alert/Oriented (P)      Walking or Climbing Stairs Difficulty none (P)      Dressing/Bathing Difficulty none (P)      Home Layout Able to live on 1st floor (P)      Equipment Currently Used at Home none (P)      Readmission within 30 days? No (P)      Do you currently have service(s) that help you manage your care at home? No (P)      Do you take prescription medications? Yes (P)      Do you have prescription coverage? Yes (P)      Coverage MEDICAID/[facility] COMMUNITY PLAN (P)      Do you have any problems affording any of your prescribed medications? No (P)      Is the patient taking medications as prescribed? yes (P)      Who is going to help you get home at discharge? Patient roomate will transport him home (P)      How do you get to doctors appointments? car, drives self;family or friend will provide (P)      Are you on dialysis? No (P)      Do you take coumadin? No (P)      Discharge Plan A Home (P)      Discharge Plan B Home Health (P)      DME Needed Upon Discharge  none (P)      Discharge Barriers Identified None (P)                    SW attempted to meet with patient at the bedside to conduct the dc planning assessment and patient was sleeping. SW was able to make contact with patient mother reporting that patient admitted to [facility] from home. She states that patient is currently residing in the home with a roommate and is not on dialysis nor coumadin at this time. She states that patient does not use any DME equipment at this time. Per mother, patients roommate will transport him home.         PCP  Thais Ahuja MD  [phone]    EMERGENCY    Sandy Massey, Mother,   [phone]    PHARMACY    [id number] - [place], [place] - [address] AT [address]  [address]  [facility] [address]  Phone:  [phone] Fax: [phone]    Afshan Butterfield LMSW  [facility]   [id number]

## 2022-03-29 NOTE — ASSESSMENT & PLAN NOTE
Patient takes metformin at home. Will hold insulin for now in the setting of possible alcoholic ketoacidosis.
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
## 2022-03-29 NOTE — ASSESSMENT & PLAN NOTE
Patient with high serum alcohol levels. Ketones positive on U/A. Patient with likely alcoholic ketoacidosis. Patient dry on examination.     Plan:  --normal saline with D5 10 hours 100cc/hr  --thiamine/folate  --hold insulin for now in setting of possible AKA  --replace electrolytes K>4 Mg >2
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
## 2022-03-29 NOTE — SUBJECTIVE & OBJECTIVE
"Past Medical History:   Diagnosis Date    Alcoholism     Diabetes mellitus     Tobacco use        No past surgical history on file.    Review of patient's allergies indicates:  No Known Allergies    No current facility-administered medications on file prior to encounter.     Current Outpatient Medications on File Prior to Encounter   Medication Sig    folic acid (FOLVITE) 1 MG tablet Take 1,000 mcg by mouth once daily.    lisinopriL (PRINIVIL,ZESTRIL) 20 MG tablet Take 1 tablet (20 mg total) by mouth once daily.    metFORMIN (GLUCOPHAGE) 500 MG tablet Take 1,000 mg by mouth 2 (two) times a day.    multivitamin Tab Take 1 tablet by mouth once daily.    thiamine (VITAMIN B-1) 50 MG tablet Take 50 mg by mouth once daily.     Family History    None       Tobacco Use    Smoking status: Current Every Day Smoker    Smokeless tobacco: Never Used   Substance and Sexual Activity    Alcohol use: Yes     Comment: "a gallon a day"    Drug use: Not Currently    Sexual activity: Not on file     Review of Systems   Constitutional:  Positive for appetite change.   Eyes:  Negative for discharge.   Respiratory:  Positive for cough. Negative for chest tightness, shortness of breath and wheezing.    Cardiovascular:  Positive for chest pain (from coughing).   Gastrointestinal:  Positive for nausea and vomiting. Negative for constipation and diarrhea.   Genitourinary:  Negative for decreased urine volume and dysuria.   Musculoskeletal:         Recent leg injury   Skin:  Negative for color change.   Neurological:  Negative for weakness.   Psychiatric/Behavioral:  Positive for hallucinations. The patient is nervous/anxious.    Objective:     Vital Signs (Most Recent):  Temp: 98.4 °F (36.9 °C) (03/28/22 1814)  Pulse: 110 (03/29/22 0230)  Resp: 20 (03/29/22 0052)  BP: 126/73 (03/29/22 0330)  SpO2: 96 % (03/29/22 0230) Vital Signs (24h Range):  Temp:  [98.4 °F (36.9 °C)] 98.4 °F (36.9 °C)  Pulse:  [110-128] 110  Resp:  [20] 20  SpO2:  [95 %-97 " %] 96 %  BP: (113-163)/(56-77) 126/73     Weight: 83.9 kg (185 lb)  Body mass index is 25.8 kg/m².    Physical Exam  Vitals reviewed.   Constitutional:       Appearance: He is ill-appearing.   HENT:      Head: Normocephalic.      Right Ear: External ear normal.      Left Ear: External ear normal.      Mouth/Throat:      Mouth: Mucous membranes are dry.   Eyes:      General: No scleral icterus.        Right eye: No discharge.         Left eye: No discharge.      Extraocular Movements: Extraocular movements intact.      Conjunctiva/sclera: Conjunctivae normal.   Cardiovascular:      Rate and Rhythm: Regular rhythm. Tachycardia present.      Pulses: Normal pulses.      Heart sounds: Normal heart sounds. No murmur heard.  Pulmonary:      Effort: Pulmonary effort is normal. No respiratory distress.      Breath sounds: Normal breath sounds. No wheezing.   Abdominal:      General: Abdomen is flat. There is no distension.      Palpations: Abdomen is soft.      Tenderness: There is no abdominal tenderness.   Musculoskeletal:      Cervical back: No rigidity.      Comments: Patient has bandage wrap on left leg, appears dirty   Skin:     General: Skin is warm and dry.   Neurological:      Mental Status: He is alert and oriented to person, place, and time.      Comments: Tremors in hands   Psychiatric:      Comments: Patient is intoxicated           Significant Labs: All pertinent labs within the past 24 hours have been reviewed.    Recent Labs   Lab 12/08/21  1854 12/09/21  0802 02/04/22  0952   HGBA1C 6.6* 6.7* 7.1*       Recent Labs   Lab 03/28/22 2000   BILITOT 0.9       Recent Labs   Lab 03/28/22 2000   *      K 3.0*   CL 95   CO2 24   BUN 12   CREATININE 0.7   CALCIUM 9.0   MG 2.5       Recent Labs   Lab 03/28/22 2000   WBC 7.58   HGB 15.0   HCT 42.9   *       Recent Labs   Lab 03/28/22 2000      K 3.0*   CL 95   CO2 24   *   BUN 12   CREATININE 0.7   CALCIUM 9.0   PROT 7.5   ALBUMIN  4.1   BILITOT 0.9   ALKPHOS 58   AST 65*   ALT 44   ANIONGAP 19*   EGFRNONAA >60.0         Recent Labs   Lab 03/28/22 2000   LIPASE 68*       Recent Labs   Lab 03/28/22 2000   MG 2.5       Recent Labs   Lab 03/28/22  1916   POCTGLUCOSE 219*       TSH:   Recent Labs   Lab 12/08/21  1854   TSH 0.471       Urine Studies:   Recent Labs   Lab 03/28/22  2200   COLORU Yellow   APPEARANCEUA Clear   PHUR 6.0   SPECGRAV >=1.030*   PROTEINUA Negative   GLUCUA 3+*   KETONESU 1+*   BILIRUBINUA Negative   OCCULTUA 2+*   NITRITE Negative   LEUKOCYTESUR Negative   RBCUA 2   WBCUA 1   BACTERIA None   SQUAMEPITHEL 1     None    Significant Imaging: I have reviewed all pertinent imaging results/findings within the past 24 hours.

## 2022-03-29 NOTE — ASSESSMENT & PLAN NOTE
Patient smokes 1 ppd for unknown amount of time.    Plan:  --Discuss smoking cessation once patient feels better  --Nicotine patch ordered

## 2022-03-29 NOTE — ASSESSMENT & PLAN NOTE
Patient has history of withdraws for which he has been hospitalized. Notes that when he tries to quit he gets symptoms of withdrawal.    See alcohol use disorder, severe, dependence
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
## 2022-03-30 PROBLEM — S99.192A CLOSED FRACTURE OF BASE OF FIFTH METATARSAL BONE OF LEFT FOOT AT METAPHYSEAL-DIAPHYSEAL JUNCTION: Status: ACTIVE | Noted: 2022-03-30

## 2022-03-30 LAB
ALBUMIN SERPL BCP-MCNC: 3.4 G/DL (ref 3.5–5.2)
ALP SERPL-CCNC: 53 U/L (ref 55–135)
ALT SERPL W/O P-5'-P-CCNC: 32 U/L (ref 10–44)
ANION GAP SERPL CALC-SCNC: 12 MMOL/L (ref 8–16)
AST SERPL-CCNC: 45 U/L (ref 10–40)
BASOPHILS # BLD AUTO: 0.04 K/UL (ref 0–0.2)
BASOPHILS NFR BLD: 0.6 % (ref 0–1.9)
BILIRUB SERPL-MCNC: 1 MG/DL (ref 0.1–1)
BUN SERPL-MCNC: 12 MG/DL (ref 6–20)
CALCIUM SERPL-MCNC: 9.4 MG/DL (ref 8.7–10.5)
CHLORIDE SERPL-SCNC: 97 MMOL/L (ref 95–110)
CO2 SERPL-SCNC: 25 MMOL/L (ref 23–29)
CREAT SERPL-MCNC: 0.7 MG/DL (ref 0.5–1.4)
DIFFERENTIAL METHOD: ABNORMAL
EOSINOPHIL # BLD AUTO: 0.2 K/UL (ref 0–0.5)
EOSINOPHIL NFR BLD: 2.5 % (ref 0–8)
ERYTHROCYTE [DISTWIDTH] IN BLOOD BY AUTOMATED COUNT: 12.7 % (ref 11.5–14.5)
EST. GFR  (AFRICAN AMERICAN): >60 ML/MIN/1.73 M^2
EST. GFR  (NON AFRICAN AMERICAN): >60 ML/MIN/1.73 M^2
GLUCOSE SERPL-MCNC: 140 MG/DL (ref 70–110)
HCT VFR BLD AUTO: 38.8 % (ref 40–54)
HGB BLD-MCNC: 13.3 G/DL (ref 14–18)
IMM GRANULOCYTES # BLD AUTO: 0.02 K/UL (ref 0–0.04)
IMM GRANULOCYTES NFR BLD AUTO: 0.3 % (ref 0–0.5)
LYMPHOCYTES # BLD AUTO: 1.3 K/UL (ref 1–4.8)
LYMPHOCYTES NFR BLD: 18.5 % (ref 18–48)
MAGNESIUM SERPL-MCNC: 2.1 MG/DL (ref 1.6–2.6)
MCH RBC QN AUTO: 28.2 PG (ref 27–31)
MCHC RBC AUTO-ENTMCNC: 34.3 G/DL (ref 32–36)
MCV RBC AUTO: 82 FL (ref 82–98)
MONOCYTES # BLD AUTO: 0.5 K/UL (ref 0.3–1)
MONOCYTES NFR BLD: 7.8 % (ref 4–15)
NEUTROPHILS # BLD AUTO: 4.8 K/UL (ref 1.8–7.7)
NEUTROPHILS NFR BLD: 70.3 % (ref 38–73)
NRBC BLD-RTO: 0 /100 WBC
PHOSPHATE SERPL-MCNC: 2.9 MG/DL (ref 2.7–4.5)
PLATELET # BLD AUTO: 87 K/UL (ref 150–450)
PMV BLD AUTO: 11.5 FL (ref 9.2–12.9)
POCT GLUCOSE: 221 MG/DL (ref 70–110)
POCT GLUCOSE: 259 MG/DL (ref 70–110)
POTASSIUM SERPL-SCNC: 3.2 MMOL/L (ref 3.5–5.1)
PROT SERPL-MCNC: 6.3 G/DL (ref 6–8.4)
RBC # BLD AUTO: 4.71 M/UL (ref 4.6–6.2)
SODIUM SERPL-SCNC: 134 MMOL/L (ref 136–145)
WBC # BLD AUTO: 6.82 K/UL (ref 3.9–12.7)

## 2022-03-30 PROCEDURE — 99232 PR SUBSEQUENT HOSPITAL CARE,LEVL II: ICD-10-PCS | Mod: ,,, | Performed by: HOSPITALIST

## 2022-03-30 PROCEDURE — 25000003 PHARM REV CODE 250

## 2022-03-30 PROCEDURE — S4991 NICOTINE PATCH NONLEGEND: HCPCS

## 2022-03-30 PROCEDURE — 85025 COMPLETE CBC W/AUTO DIFF WBC: CPT

## 2022-03-30 PROCEDURE — 99232 SBSQ HOSP IP/OBS MODERATE 35: CPT | Mod: ,,, | Performed by: HOSPITALIST

## 2022-03-30 PROCEDURE — 25000003 PHARM REV CODE 250: Performed by: STUDENT IN AN ORGANIZED HEALTH CARE EDUCATION/TRAINING PROGRAM

## 2022-03-30 PROCEDURE — 20600001 HC STEP DOWN PRIVATE ROOM

## 2022-03-30 PROCEDURE — 84100 ASSAY OF PHOSPHORUS: CPT

## 2022-03-30 PROCEDURE — 99232 PR SUBSEQUENT HOSPITAL CARE,LEVL II: ICD-10-PCS | Mod: AF,HB,, | Performed by: PSYCHIATRY & NEUROLOGY

## 2022-03-30 PROCEDURE — 80053 COMPREHEN METABOLIC PANEL: CPT

## 2022-03-30 PROCEDURE — 63600175 PHARM REV CODE 636 W HCPCS: Performed by: STUDENT IN AN ORGANIZED HEALTH CARE EDUCATION/TRAINING PROGRAM

## 2022-03-30 PROCEDURE — 36415 COLL VENOUS BLD VENIPUNCTURE: CPT

## 2022-03-30 PROCEDURE — 83735 ASSAY OF MAGNESIUM: CPT

## 2022-03-30 PROCEDURE — 63600175 PHARM REV CODE 636 W HCPCS

## 2022-03-30 PROCEDURE — 99232 SBSQ HOSP IP/OBS MODERATE 35: CPT | Mod: AF,HB,, | Performed by: PSYCHIATRY & NEUROLOGY

## 2022-03-30 RX ORDER — POTASSIUM CHLORIDE 20 MEQ/1
40 TABLET, EXTENDED RELEASE ORAL
Status: COMPLETED | OUTPATIENT
Start: 2022-03-30 | End: 2022-03-30

## 2022-03-30 RX ORDER — HYDROXYZINE HYDROCHLORIDE 25 MG/1
25 TABLET, FILM COATED ORAL 3 TIMES DAILY PRN
Status: DISCONTINUED | OUTPATIENT
Start: 2022-03-30 | End: 2022-03-30

## 2022-03-30 RX ADMIN — DIAZEPAM 10 MG: 10 INJECTION, SOLUTION INTRAMUSCULAR; INTRAVENOUS at 08:03

## 2022-03-30 RX ADMIN — FOLIC ACID 1 MG: 1 TABLET ORAL at 08:03

## 2022-03-30 RX ADMIN — DIAZEPAM 10 MG: 10 INJECTION, SOLUTION INTRAMUSCULAR; INTRAVENOUS at 02:03

## 2022-03-30 RX ADMIN — DIAZEPAM 10 MG: 10 INJECTION, SOLUTION INTRAMUSCULAR; INTRAVENOUS at 03:03

## 2022-03-30 RX ADMIN — INSULIN ASPART 2 UNITS: 100 INJECTION, SOLUTION INTRAVENOUS; SUBCUTANEOUS at 08:03

## 2022-03-30 RX ADMIN — THIAMINE HYDROCHLORIDE 500 MG: 100 INJECTION, SOLUTION INTRAMUSCULAR; INTRAVENOUS at 08:03

## 2022-03-30 RX ADMIN — MUPIROCIN: 20 OINTMENT TOPICAL at 08:03

## 2022-03-30 RX ADMIN — ENOXAPARIN SODIUM 40 MG: 100 INJECTION SUBCUTANEOUS at 05:03

## 2022-03-30 RX ADMIN — INSULIN ASPART 1 UNITS: 100 INJECTION, SOLUTION INTRAVENOUS; SUBCUTANEOUS at 10:03

## 2022-03-30 RX ADMIN — POTASSIUM CHLORIDE 40 MEQ: 1500 TABLET, EXTENDED RELEASE ORAL at 10:03

## 2022-03-30 RX ADMIN — HYDROXYZINE HYDROCHLORIDE 25 MG: 25 TABLET, FILM COATED ORAL at 04:03

## 2022-03-30 RX ADMIN — NICOTINE 1 PATCH: 14 PATCH, EXTENDED RELEASE TRANSDERMAL at 09:03

## 2022-03-30 RX ADMIN — INSULIN ASPART 2 UNITS: 100 INJECTION, SOLUTION INTRAVENOUS; SUBCUTANEOUS at 06:03

## 2022-03-30 RX ADMIN — THIAMINE HYDROCHLORIDE 500 MG: 100 INJECTION, SOLUTION INTRAMUSCULAR; INTRAVENOUS at 03:03

## 2022-03-30 RX ADMIN — THERA TABS 1 TABLET: TAB at 08:03

## 2022-03-30 RX ADMIN — LORAZEPAM 2 MG: 2 INJECTION INTRAMUSCULAR; INTRAVENOUS at 04:03

## 2022-03-30 RX ADMIN — LORAZEPAM 2 MG: 2 INJECTION INTRAMUSCULAR; INTRAVENOUS at 09:03

## 2022-03-30 RX ADMIN — POTASSIUM CHLORIDE 40 MEQ: 1500 TABLET, EXTENDED RELEASE ORAL at 12:03

## 2022-03-30 NOTE — ASSESSMENT & PLAN NOTE
"On lisinopril at home.    BP today: BP (!) 167/96 (BP Location: Right arm, Patient Position: Lying)   Pulse 98   Temp 97.9 °F (36.6 °C) (Oral)   Resp 16   Ht 5' 11" (1.803 m)   Wt 83.9 kg (185 lb)   SpO2 97%   BMI 25.80 kg/m²     PLAN:  --Monitor BP -- has been labile  --Continue [facility] protocol  "

## 2022-03-30 NOTE — ASSESSMENT & PLAN NOTE
Patient notes that he drinks 1 gallon of alcohol a day. Patient interested in getting help with detox. He is interested in seeing addiction psychiatry when brought up to him. Concern for withdrawal, physical exam showing tremors and he is tachycardic. 5mg IV diazepam given in the ED. Patient recently treated for alcohol withdrawal in feb 2022.    PLAN:  --Addiction Psych consulted  --CIWA protocol  --diazepam 10mg IV q6h  --Lorazepam 2mg IV q4h prn for CIWA >8  --Seizure precautions  --Fall precautions  --Q4h vital signs  --Thiamine, folate, MV

## 2022-03-30 NOTE — HOSPITAL COURSE
3/30/22  Mr. Cook is awake and alert this morning. He reports poor sleep overnight. Endorses episodic nausea. Denies headache, dizziness, emesis, abdominal pain, tremors. He denies SI/HI/AVH. States that he is looking into rehab options when discharged.    3/31/22  Mr. Cook endorses poor sleep overnight. Denies tremors, headache, nausea, vomiting. Received prn ativan x2 overnight. Denies SI/HI/AVH.

## 2022-03-30 NOTE — ASSESSMENT & PLAN NOTE
Patient takes metformin at home.     Lab Results   Component Value Date    HGBA1C 7.1 (H) 02/04/2022     PLAN:  --POCT BG TIDWM + QHS  --LDSSI  --Diabetic diet

## 2022-03-30 NOTE — SUBJECTIVE & OBJECTIVE
"    Family History    None       Tobacco Use    Smoking status: Current Every Day Smoker    Smokeless tobacco: Never Used   Substance and Sexual Activity    Alcohol use: Yes     Comment: "a gallon a day"    Drug use: Not Currently    Sexual activity: Not on file     Psychotherapeutics (From admission, onward)                Start     Stop Route Frequency Ordered    03/29/22 1500  diazePAM injection 10 mg         -- IV Every 6 hours (non-standard times) 03/29/22 1036    03/29/22 1135  lorazepam injection 2 mg         -- IV Every 4 hours PRN 03/29/22 1101             Review of Systems   Constitutional:  Negative for activity change and fever.   HENT:  Negative for congestion, sneezing, sore throat and trouble swallowing.    Eyes:  Negative for discharge.   Respiratory:  Positive for cough. Negative for shortness of breath and wheezing.    Cardiovascular:  Negative for chest pain.   Gastrointestinal:  Negative for abdominal pain, constipation, diarrhea, nausea and vomiting.   Endocrine: Negative for cold intolerance and heat intolerance.   Musculoskeletal:  Negative for back pain.   Skin:  Negative for color change.   Neurological:  Negative for dizziness, syncope and weakness.   Psychiatric/Behavioral:  Positive for sleep disturbance. Negative for agitation, behavioral problems, confusion, decreased concentration, self-injury and suicidal ideas.    Objective:     Vital Signs (Most Recent):  Temp: 98.1 °F (36.7 °C) (03/30/22 0815)  Pulse: 93 (03/30/22 0815)  Resp: 16 (03/30/22 0415)  BP: (!) 142/101 (03/30/22 0732)  SpO2: 97 % (03/30/22 0415)   Vital Signs (24h Range):  Temp:  [97.3 °F (36.3 °C)-98.7 °F (37.1 °C)] 98.1 °F (36.7 °C)  Pulse:  [] 93  Resp:  [6-26] 16  SpO2:  [96 %-100 %] 97 %  BP: (121-163)/() 142/101     Height: 5' 11" (180.3 cm)  Weight: 83.9 kg (185 lb)  Body mass index is 25.8 kg/m².      Intake/Output Summary (Last 24 hours) at 3/30/2022 0956  Last data filed at 3/30/2022 0827  Gross " "per 24 hour   Intake 1198.02 ml   Output 3300 ml   Net -2101.98 ml     Mental Status Exam:  Appearance: unremarkable, age appropriate  Level of Consciousness:  Awake, alert  Behavior/Cooperation: normal, cooperative  Psychomotor: unremarkable   Speech: normal tone, normal rate, normal pitch, normal volume  Language: english, fluid  Orientation: person, place, situation, month of year, year  Attention Span/Concentration: intact  Memory: Intact  Mood: "feeling like myself again"  Affect: normal  Thought Process: linear, normal and logical  Associations: normal and logical  Thought Content: normal, no suicidality, no homicidality, delusions, or paranoia  Fund of Knowledge: Intact  Insight: fair  Judgment: fair      Physical Exam  Constitutional:       Appearance: Normal appearance.   HENT:      Head: Normocephalic and atraumatic.      Nose: Nose normal.      Mouth/Throat:      Pharynx: Oropharynx is clear.   Eyes:      Extraocular Movements: Extraocular movements intact.      Conjunctiva/sclera: Conjunctivae normal.   Musculoskeletal:      Cervical back: Normal range of motion.   Skin:     General: Skin is dry.   Neurological:      Mental Status: He is alert and oriented to person, place, and time.   Psychiatric:         Mood and Affect: Mood normal.     NEUROLOGICAL EXAMINATION:     MENTAL STATUS   Oriented to person, place, and time.   Significant Labs: Last 24 Hours:   Recent Lab Results  (Last 5 results in the past 24 hours)        03/30/22  0736   03/30/22  0310   03/29/22  2041   03/29/22  1742   03/29/22  1141        Alcohol, Urine       29         Benzodiazepines       Presumptive Positive         Methadone metabolites       Negative         Phencyclidine       Negative         Albumin   3.4             Alkaline Phosphatase   53             ALT   32             Amphetamine Screen, Ur       Negative         Anion Gap   12             AST   45             Barbiturate Screen, Ur       Negative         Baso #   " 0.04             Basophil %   0.6             BILIRUBIN TOTAL   1.0  Comment: For infants and newborns, interpretation of results should be based  on gestational age, weight and in agreement with clinical  observations.    Premature Infant recommended reference ranges:  Up to 24 hours.............<8.0 mg/dL  Up to 48 hours............<12.0 mg/dL  3-5 days..................<15.0 mg/dL  6-29 days.................<15.0 mg/dL               BUN   12             Calcium   9.4             Chloride   97             CO2   25             Cocaine (Metab.)       Negative         Creatinine   0.7             Creatinine, Urine       97.0         Differential Method   Automated             eGFR if    >60.0             eGFR if non    >60.0  Comment: Calculation used to obtain the estimated glomerular filtration  rate (eGFR) is the CKD-EPI equation.                Eos #   0.2             Eosinophil %   2.5             Glucose   140             Gran # (ANC)   4.8             Gran %   70.3             Hematocrit   38.8             Hemoglobin   13.3             Immature Grans (Abs)   0.02  Comment: Mild elevation in immature granulocytes is non specific and   can be seen in a variety of conditions including stress response,   acute inflammation, trauma and pregnancy. Correlation with other   laboratory and clinical findings is essential.               Immature Granulocytes   0.3             Lymph #   1.3             Lymph %   18.5             Magnesium   2.1             MCH   28.2             MCHC   34.3             MCV   82             Mono #   0.5             Mono %   7.8             MPV   11.5             nRBC   0             Opiate Scrn, Ur       Negative         Phosphorus   2.9             Platelets   87             POCT Glucose 221     158     154       Potassium   3.2             PROTEIN TOTAL   6.3             RBC   4.71             RDW   12.7             Sodium   134             Marijuana (THC)  Metabolite       Negative         Toxicology Information       SEE COMMENT  Comment: This screen includes the following classes of drugs at the listed   cut-off:    Benzodiazepines 200 ng/ml  Methadone 300 ng/ml  Cocaine metabolite 300 ng/ml  Opiates 300 ng/ml  Barbiturates 200 ng/ml  Amphetamines 1000 ng/ml  Marijuana metabs (THC) 50 ng/ml  Phencyclidine (PCP) 25 ng/ml    This is a screening test. If results do not correlate with clinical   presentation, then a confirmatory send out test is advised.     This report is intended for use in clinical monitoring and management   of   patients. It is not intended for use in employment related drug   testing.           WBC   6.82                                    Significant Imaging:  no new

## 2022-03-30 NOTE — ASSESSMENT & PLAN NOTE
Patient smokes 1 ppd for unknown amount of time.    PLAN:  --Smoking cessation counseling  --Nicotine patch while admitted
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
## 2022-03-30 NOTE — ASSESSMENT & PLAN NOTE
ASSESSMENT:     DIAGNOSES & PROBLEMS  Alcohol use disorder, severe      STRENGTHS AND LIABILITIES   Strength: Patient accepts guidance/feedback      MOTIVATION TO PURSUE RECOVERY: moderate    ACCEPTANCE OF ADDICTION: fair    ABILITY TO ADHERE TO TREATMENT PLAN: moderate      PLAN:       MANAGEMENT PLAN, TREATMENT GOALS, THERAPEUTIC TECHNIQUES/APPROACHES & CLINICAL REASONING    Recommend continuing valium to 10 mg q6hrs IV with IV ativan PRN q4hrs for CIWA>8.      · Patient counseled on abstinence from alcohol and substances of abuse (illicit and prescription).  · Additional workup planned to address substance use disorder, in order to guide and refine ongoing management options, includes serial alcohol and drug laboratory testing (e.g. PETH, urine toxicology).  · Relapse prevention and motivational interviewing provided.  · Education provided on 12 step recovery programs.      PRESCRIPTION DRUG MANAGEMENT  - The risks and benefits of medication were discussed with this patient.  - Possible expectable adverse effects of any current or proposed individual psychotropic agents were discussed with this patient.  - Counseling was provided on the importance of full compliance with medication regimens.      In cases of emergency, daily coverage provided by Acute/ER Psych MD, NP, or SW, with contact numbers located in [facility] On Call Schedule    Case discussed with staff addiction psychiatrist: ELIJAH BEAVER MD

## 2022-03-30 NOTE — NURSING
Pt. Lying in bed. No distress noted, no complaints voiced at this time. Call light in reach, aware on use. Pt. States he had a good day today and is ready for discharge and ready to return to work. POC reviewed with patient, voices understanding.
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
## 2022-03-30 NOTE — PROGRESS NOTES
"Patient is in bed , no shortness of breath noted .Slept on and off throughout the night .About 0400 patient is anxious , agitated in bed patient states :" I think I had a bad dream , like a snake was trying to bite me , so I was looking for my phone to search what's good for snake bite , or what poison it contains "patient is reoriented HR was in the 110 /110 place call to MD update him about the clinical situation ,order is received and given .Patient requested a turkey sandwich and was given urine output is documented . Patient requested if he can pace in the hallway advise patient to remained in bed due to unstable gait , and cast present in the leg .Report is given to the incoming nurse no shortness of breath noted .    "

## 2022-03-30 NOTE — ASSESSMENT & PLAN NOTE
Patient has history of withdraws for which he has been hospitalized. Notes that when he tries to quit he gets symptoms of withdrawal.    See alcohol use disorder, severe, dependence.

## 2022-03-30 NOTE — SUBJECTIVE & OBJECTIVE
Interval History: NAEON. Patient overall feeling better today, but still tremulous and anxious. Will continue detox protocol per Addiction Psych.    Review of Systems   Constitutional:  Negative for activity change, appetite change, chills, diaphoresis and fever.   HENT:  Negative for congestion, sneezing, sore throat and trouble swallowing.    Eyes:  Negative for visual disturbance.   Respiratory:  Positive for cough. Negative for shortness of breath and wheezing.    Cardiovascular:  Negative for chest pain and palpitations.   Gastrointestinal:  Negative for abdominal pain, constipation, diarrhea, nausea and vomiting.   Endocrine: Negative for cold intolerance and heat intolerance.   Musculoskeletal:  Negative for back pain.   Skin:  Negative for color change.   Neurological:  Negative for dizziness, syncope and weakness.   Psychiatric/Behavioral:  Positive for sleep disturbance. Negative for agitation, behavioral problems, confusion, decreased concentration, hallucinations, self-injury and suicidal ideas.      Objective:     Vital Signs (Most Recent):  Temp: 97.9 °F (36.6 °C) (03/30/22 1139)  Pulse: 98 (03/30/22 1139)  Resp: 16 (03/30/22 0415)  BP: (!) 167/96 (03/30/22 1139)  SpO2: 97 % (03/30/22 0415)   Vital Signs (24h Range):  Temp:  [97.3 °F (36.3 °C)-98.6 °F (37 °C)] 97.9 °F (36.6 °C)  Pulse:  [] 98  Resp:  [6-26] 16  SpO2:  [96 %-100 %] 97 %  BP: (121-167)/() 167/96     Weight: 83.9 kg (185 lb)  Body mass index is 25.8 kg/m².    Intake/Output Summary (Last 24 hours) at 3/30/2022 1305  Last data filed at 3/30/2022 1100  Gross per 24 hour   Intake 1198.02 ml   Output 3700 ml   Net -2501.98 ml      Physical Exam  Vitals and nursing note reviewed.   Constitutional:       General: He is not in acute distress.     Appearance: Normal appearance. He is not ill-appearing or diaphoretic.   HENT:      Head: Normocephalic and atraumatic.      Mouth/Throat:      Pharynx: Oropharynx is clear.   Eyes:       Extraocular Movements: Extraocular movements intact.      Conjunctiva/sclera: Conjunctivae normal.   Cardiovascular:      Rate and Rhythm: Normal rate and regular rhythm.   Pulmonary:      Effort: Pulmonary effort is normal. No respiratory distress.   Abdominal:      General: Abdomen is flat. There is no distension.   Musculoskeletal:      Cervical back: Normal range of motion.      Comments: Patient has bandage wrap on left leg, appears dirty   Skin:     General: Skin is warm and dry.   Neurological:      Mental Status: He is alert and oriented to person, place, and time.   Psychiatric:         Mood and Affect: Mood normal.         Behavior: Behavior normal.         Thought Content: Thought content normal.         Judgment: Judgment normal.       Significant Labs: All pertinent labs within the past 24 hours have been reviewed.    Significant Imaging: I have reviewed all pertinent imaging results/findings within the past 24 hours.

## 2022-03-30 NOTE — PLAN OF CARE
Patient anxiety will be under control, patient blood pressure monitored closely ,will not experience falls during stay

## 2022-03-31 LAB
ALBUMIN SERPL BCP-MCNC: 3.4 G/DL (ref 3.5–5.2)
ALP SERPL-CCNC: 52 U/L (ref 55–135)
ALT SERPL W/O P-5'-P-CCNC: 33 U/L (ref 10–44)
ANION GAP SERPL CALC-SCNC: 12 MMOL/L (ref 8–16)
AST SERPL-CCNC: 35 U/L (ref 10–40)
BASOPHILS # BLD AUTO: 0.05 K/UL (ref 0–0.2)
BASOPHILS NFR BLD: 0.7 % (ref 0–1.9)
BILIRUB SERPL-MCNC: 0.4 MG/DL (ref 0.1–1)
BUN SERPL-MCNC: 15 MG/DL (ref 6–20)
CALCIUM SERPL-MCNC: 9.8 MG/DL (ref 8.7–10.5)
CHLORIDE SERPL-SCNC: 100 MMOL/L (ref 95–110)
CO2 SERPL-SCNC: 22 MMOL/L (ref 23–29)
CREAT SERPL-MCNC: 0.8 MG/DL (ref 0.5–1.4)
DIFFERENTIAL METHOD: ABNORMAL
EOSINOPHIL # BLD AUTO: 0.2 K/UL (ref 0–0.5)
EOSINOPHIL NFR BLD: 2.9 % (ref 0–8)
ERYTHROCYTE [DISTWIDTH] IN BLOOD BY AUTOMATED COUNT: 12.5 % (ref 11.5–14.5)
EST. GFR  (AFRICAN AMERICAN): >60 ML/MIN/1.73 M^2
EST. GFR  (NON AFRICAN AMERICAN): >60 ML/MIN/1.73 M^2
GLUCOSE SERPL-MCNC: 251 MG/DL (ref 70–110)
HCT VFR BLD AUTO: 40.2 % (ref 40–54)
HGB BLD-MCNC: 13.7 G/DL (ref 14–18)
IMM GRANULOCYTES # BLD AUTO: 0.03 K/UL (ref 0–0.04)
IMM GRANULOCYTES NFR BLD AUTO: 0.4 % (ref 0–0.5)
LYMPHOCYTES # BLD AUTO: 1.4 K/UL (ref 1–4.8)
LYMPHOCYTES NFR BLD: 20.4 % (ref 18–48)
MAGNESIUM SERPL-MCNC: 1.9 MG/DL (ref 1.6–2.6)
MCH RBC QN AUTO: 28.2 PG (ref 27–31)
MCHC RBC AUTO-ENTMCNC: 34.1 G/DL (ref 32–36)
MCV RBC AUTO: 83 FL (ref 82–98)
MONOCYTES # BLD AUTO: 0.8 K/UL (ref 0.3–1)
MONOCYTES NFR BLD: 11 % (ref 4–15)
NEUTROPHILS # BLD AUTO: 4.4 K/UL (ref 1.8–7.7)
NEUTROPHILS NFR BLD: 64.6 % (ref 38–73)
NRBC BLD-RTO: 0 /100 WBC
PHOSPHATE SERPL-MCNC: 4.4 MG/DL (ref 2.7–4.5)
PLATELET # BLD AUTO: 102 K/UL (ref 150–450)
PMV BLD AUTO: 10.8 FL (ref 9.2–12.9)
POCT GLUCOSE: 192 MG/DL (ref 70–110)
POCT GLUCOSE: 217 MG/DL (ref 70–110)
POCT GLUCOSE: 261 MG/DL (ref 70–110)
POTASSIUM SERPL-SCNC: 3.6 MMOL/L (ref 3.5–5.1)
PROT SERPL-MCNC: 6.4 G/DL (ref 6–8.4)
RBC # BLD AUTO: 4.86 M/UL (ref 4.6–6.2)
SODIUM SERPL-SCNC: 134 MMOL/L (ref 136–145)
WBC # BLD AUTO: 6.82 K/UL (ref 3.9–12.7)

## 2022-03-31 PROCEDURE — 85025 COMPLETE CBC W/AUTO DIFF WBC: CPT

## 2022-03-31 PROCEDURE — 83735 ASSAY OF MAGNESIUM: CPT

## 2022-03-31 PROCEDURE — 20600001 HC STEP DOWN PRIVATE ROOM

## 2022-03-31 PROCEDURE — 25000003 PHARM REV CODE 250: Performed by: STUDENT IN AN ORGANIZED HEALTH CARE EDUCATION/TRAINING PROGRAM

## 2022-03-31 PROCEDURE — S4991 NICOTINE PATCH NONLEGEND: HCPCS

## 2022-03-31 PROCEDURE — C9399 UNCLASSIFIED DRUGS OR BIOLOG: HCPCS | Performed by: STUDENT IN AN ORGANIZED HEALTH CARE EDUCATION/TRAINING PROGRAM

## 2022-03-31 PROCEDURE — 84100 ASSAY OF PHOSPHORUS: CPT

## 2022-03-31 PROCEDURE — 63600175 PHARM REV CODE 636 W HCPCS

## 2022-03-31 PROCEDURE — 99233 PR SUBSEQUENT HOSPITAL CARE,LEVL III: ICD-10-PCS | Mod: ,,, | Performed by: HOSPITALIST

## 2022-03-31 PROCEDURE — 63600175 PHARM REV CODE 636 W HCPCS: Performed by: STUDENT IN AN ORGANIZED HEALTH CARE EDUCATION/TRAINING PROGRAM

## 2022-03-31 PROCEDURE — 25000003 PHARM REV CODE 250

## 2022-03-31 PROCEDURE — 80053 COMPREHEN METABOLIC PANEL: CPT

## 2022-03-31 PROCEDURE — 99232 SBSQ HOSP IP/OBS MODERATE 35: CPT | Mod: AF,HB,, | Performed by: PSYCHIATRY & NEUROLOGY

## 2022-03-31 PROCEDURE — 99232 PR SUBSEQUENT HOSPITAL CARE,LEVL II: ICD-10-PCS | Mod: AF,HB,, | Performed by: PSYCHIATRY & NEUROLOGY

## 2022-03-31 PROCEDURE — 36415 COLL VENOUS BLD VENIPUNCTURE: CPT

## 2022-03-31 PROCEDURE — 94761 N-INVAS EAR/PLS OXIMETRY MLT: CPT

## 2022-03-31 PROCEDURE — 99233 SBSQ HOSP IP/OBS HIGH 50: CPT | Mod: ,,, | Performed by: HOSPITALIST

## 2022-03-31 RX ORDER — LISINOPRIL 20 MG/1
20 TABLET ORAL DAILY
Status: DISCONTINUED | OUTPATIENT
Start: 2022-03-31 | End: 2022-04-01 | Stop reason: HOSPADM

## 2022-03-31 RX ORDER — POTASSIUM CHLORIDE 20 MEQ/1
20 TABLET, EXTENDED RELEASE ORAL ONCE
Status: COMPLETED | OUTPATIENT
Start: 2022-03-31 | End: 2022-03-31

## 2022-03-31 RX ORDER — DIAZEPAM 10 MG/2ML
10 INJECTION INTRAMUSCULAR EVERY 8 HOURS
Status: DISCONTINUED | OUTPATIENT
Start: 2022-03-31 | End: 2022-04-01 | Stop reason: HOSPADM

## 2022-03-31 RX ORDER — INSULIN ASPART 100 [IU]/ML
2 INJECTION, SOLUTION INTRAVENOUS; SUBCUTANEOUS
Status: DISCONTINUED | OUTPATIENT
Start: 2022-03-31 | End: 2022-04-01 | Stop reason: HOSPADM

## 2022-03-31 RX ORDER — HYDROXYZINE PAMOATE 25 MG/1
50 CAPSULE ORAL 3 TIMES DAILY PRN
Status: DISCONTINUED | OUTPATIENT
Start: 2022-03-31 | End: 2022-04-01 | Stop reason: HOSPADM

## 2022-03-31 RX ADMIN — MUPIROCIN: 20 OINTMENT TOPICAL at 08:03

## 2022-03-31 RX ADMIN — DIAZEPAM 10 MG: 10 INJECTION, SOLUTION INTRAMUSCULAR; INTRAVENOUS at 09:03

## 2022-03-31 RX ADMIN — INSULIN ASPART 3 UNITS: 100 INJECTION, SOLUTION INTRAVENOUS; SUBCUTANEOUS at 12:03

## 2022-03-31 RX ADMIN — THIAMINE HYDROCHLORIDE 500 MG: 100 INJECTION, SOLUTION INTRAMUSCULAR; INTRAVENOUS at 08:03

## 2022-03-31 RX ADMIN — INSULIN DETEMIR 5 UNITS: 100 INJECTION, SOLUTION SUBCUTANEOUS at 09:03

## 2022-03-31 RX ADMIN — THERA TABS 1 TABLET: TAB at 08:03

## 2022-03-31 RX ADMIN — NICOTINE 1 PATCH: 14 PATCH, EXTENDED RELEASE TRANSDERMAL at 08:03

## 2022-03-31 RX ADMIN — INSULIN ASPART 2 UNITS: 100 INJECTION, SOLUTION INTRAVENOUS; SUBCUTANEOUS at 12:03

## 2022-03-31 RX ADMIN — INSULIN ASPART 2 UNITS: 100 INJECTION, SOLUTION INTRAVENOUS; SUBCUTANEOUS at 04:03

## 2022-03-31 RX ADMIN — MUPIROCIN: 20 OINTMENT TOPICAL at 09:03

## 2022-03-31 RX ADMIN — THIAMINE HYDROCHLORIDE 500 MG: 100 INJECTION, SOLUTION INTRAMUSCULAR; INTRAVENOUS at 09:03

## 2022-03-31 RX ADMIN — ENOXAPARIN SODIUM 40 MG: 100 INJECTION SUBCUTANEOUS at 05:03

## 2022-03-31 RX ADMIN — LORAZEPAM 2 MG: 2 INJECTION INTRAMUSCULAR; INTRAVENOUS at 09:03

## 2022-03-31 RX ADMIN — DIAZEPAM 10 MG: 10 INJECTION, SOLUTION INTRAMUSCULAR; INTRAVENOUS at 02:03

## 2022-03-31 RX ADMIN — FOLIC ACID 1 MG: 1 TABLET ORAL at 08:03

## 2022-03-31 RX ADMIN — LISINOPRIL 20 MG: 20 TABLET ORAL at 08:03

## 2022-03-31 RX ADMIN — LORAZEPAM 2 MG: 2 INJECTION INTRAMUSCULAR; INTRAVENOUS at 03:03

## 2022-03-31 RX ADMIN — POTASSIUM CHLORIDE 20 MEQ: 1500 TABLET, EXTENDED RELEASE ORAL at 08:03

## 2022-03-31 RX ADMIN — THIAMINE HYDROCHLORIDE 500 MG: 100 INJECTION, SOLUTION INTRAMUSCULAR; INTRAVENOUS at 03:03

## 2022-03-31 RX ADMIN — DIAZEPAM 10 MG: 10 INJECTION, SOLUTION INTRAMUSCULAR; INTRAVENOUS at 08:03

## 2022-03-31 RX ADMIN — HYDROXYZINE PAMOATE 50 MG: 25 CAPSULE ORAL at 09:03

## 2022-03-31 NOTE — SUBJECTIVE & OBJECTIVE
Interval History: Patient seen and examined. No acute events overnight apart from insomnia and received PRN Ativan x2. Plain films of lower extremity without concerning changes and cast exchanged yesterday per Orthopedic Surgery. Afebrile with pulse in the 100s bpm. Systolic blood pressures ranging from 150-140s mmHg. He is saturating +97% on room air. Spacing out Valium taper per Addiction Psychiatry recommendations.    Review of Systems   Constitutional:  Negative for activity change, appetite change, chills, diaphoresis and fever.   HENT:  Negative for congestion, sneezing, sore throat and trouble swallowing.    Eyes:  Negative for visual disturbance.   Respiratory:  Positive for cough. Negative for shortness of breath and wheezing.    Cardiovascular:  Negative for chest pain and palpitations.   Gastrointestinal:  Negative for abdominal pain, constipation, diarrhea, nausea and vomiting.   Endocrine: Negative for cold intolerance and heat intolerance.   Musculoskeletal:  Negative for back pain.   Skin:  Negative for color change.   Neurological:  Negative for dizziness, syncope and weakness.   Psychiatric/Behavioral:  Positive for sleep disturbance. Negative for agitation, behavioral problems, confusion, decreased concentration, hallucinations, self-injury and suicidal ideas. The patient is nervous/anxious.      Objective:     Vital Signs (Most Recent):  Temp: 97.7 °F (36.5 °C) (03/31/22 0450)  Pulse: 102 (03/30/22 1540)  Resp: 16 (03/30/22 0415)  BP: (!) 143/89 (03/31/22 0500)  SpO2: 97 % (03/30/22 0415)   Vital Signs (24h Range):  Temp:  [97.7 °F (36.5 °C)-98.5 °F (36.9 °C)] 97.7 °F (36.5 °C)  Pulse:  [] 102  BP: (140-167)/() 143/89     Weight: 83.9 kg (185 lb)  Body mass index is 25.8 kg/m².    Intake/Output Summary (Last 24 hours) at 3/31/2022 0738  Last data filed at 3/31/2022 0200  Gross per 24 hour   Intake 780 ml   Output 2801 ml   Net -2021 ml      Physical Exam  Vitals and nursing note  reviewed.   Constitutional:       General: He is not in acute distress.     Appearance: Normal appearance. He is not ill-appearing or diaphoretic.   HENT:      Head: Normocephalic and atraumatic.      Mouth/Throat:      Pharynx: Oropharynx is clear.   Eyes:      Extraocular Movements: Extraocular movements intact.      Conjunctiva/sclera: Conjunctivae normal.   Cardiovascular:      Rate and Rhythm: Normal rate and regular rhythm.   Pulmonary:      Effort: Pulmonary effort is normal. No respiratory distress.   Abdominal:      General: Abdomen is flat. There is no distension.   Musculoskeletal:      Cervical back: Normal range of motion.      Comments: Cast to left lower extremity.   Skin:     General: Skin is warm and dry.   Neurological:      Mental Status: He is alert and oriented to person, place, and time.   Psychiatric:         Mood and Affect: Mood normal.         Behavior: Behavior normal.         Thought Content: Thought content normal.         Judgment: Judgment normal.       Significant Labs: All pertinent labs within the past 24 hours have been reviewed.  Recent Lab Results         03/31/22  0325   03/30/22  2203        Albumin 3.4         Alkaline Phosphatase 52         ALT 33         Anion Gap 12         AST 35         Baso # 0.05         Basophil % 0.7         BILIRUBIN TOTAL 0.4  Comment: For infants and newborns, interpretation of results should be based  on gestational age, weight and in agreement with clinical  observations.    Premature Infant recommended reference ranges:  Up to 24 hours.............<8.0 mg/dL  Up to 48 hours............<12.0 mg/dL  3-5 days..................<15.0 mg/dL  6-29 days.................<15.0 mg/dL           BUN 15         Calcium 9.8         Chloride 100         CO2 22         Creatinine 0.8         Differential Method Automated         eGFR if  >60.0         eGFR if non  >60.0  Comment: Calculation used to obtain the estimated glomerular  filtration  rate (eGFR) is the CKD-EPI equation.            Eos # 0.2         Eosinophil % 2.9         Glucose 251         Gran # (ANC) 4.4         Gran % 64.6         Hematocrit 40.2         Hemoglobin 13.7         Immature Grans (Abs) 0.03  Comment: Mild elevation in immature granulocytes is non specific and   can be seen in a variety of conditions including stress response,   acute inflammation, trauma and pregnancy. Correlation with other   laboratory and clinical findings is essential.           Immature Granulocytes 0.4         Lymph # 1.4         Lymph % 20.4         Magnesium 1.9         MCH 28.2         MCHC 34.1         MCV 83         Mono # 0.8         Mono % 11.0         MPV 10.8         nRBC 0         Phosphorus 4.4         Platelets 102         POCT Glucose   259       Potassium 3.6         PROTEIN TOTAL 6.4         RBC 4.86         RDW 12.5         Sodium 134         WBC 6.82                 Significant Imaging: I have reviewed all pertinent imaging results/findings within the past 24 hours.

## 2022-03-31 NOTE — SUBJECTIVE & OBJECTIVE
"Past Medical History:   Diagnosis Date    Alcoholism     Diabetes mellitus     Tobacco use        No past surgical history on file.    Review of patient's allergies indicates:  No Known Allergies    Current Facility-Administered Medications   Medication    dextrose 10% bolus 125 mL    dextrose 10% bolus 250 mL    diazePAM injection 10 mg    enoxaparin injection 40 mg    folic acid tablet 1 mg    glucagon (human recombinant) injection 1 mg    glucose chewable tablet 16 g    glucose chewable tablet 24 g    insulin aspart U-100 pen 0-5 Units    lorazepam injection 2 mg    melatonin tablet 6 mg    multivitamin tablet    mupirocin 2 % ointment    naloxone 0.4 mg/mL injection 0.02 mg    nicotine 14 mg/24 hr 1 patch    ondansetron injection 4 mg    sodium chloride 0.9% flush 10 mL    thiamine (B-1) 500 mg in dextrose 5 % 100 mL IVPB    Followed by    [START ON 4/1/2022] thiamine (B-1) 250 mg in dextrose 5 % 100 mL IVPB    [START ON 4/4/2022] thiamine tablet 100 mg     Family History    None       Tobacco Use    Smoking status: Current Every Day Smoker    Smokeless tobacco: Never Used   Substance and Sexual Activity    Alcohol use: Yes     Comment: "a gallon a day"    Drug use: Not Currently    Sexual activity: Not on file     ROS  Constitutional: negative for fevers  Eyes: negative visual changes  ENT: negative for hearing loss  Respiratory: negative for dyspnea  Cardiovascular: negative for chest pain  Gastrointestinal: negative for abdominal pain  Genitourinary: negative for dysuria  Neurological: negative for headaches  Endocrine: negative for temperature intolerance  MSK: positive for left foot pain from recent injury  Objective:     Vital Signs (Most Recent):  Temp: 98 °F (36.7 °C) (03/30/22 1540)  Pulse: 102 (03/30/22 1540)  Resp: 16 (03/30/22 0415)  BP: (!) 140/101 (03/30/22 1540)  SpO2: 97 % (03/30/22 0415)   Vital Signs (24h Range):  Temp:  [97.3 °F (36.3 °C)-98.5 °F (36.9 °C)] 98 °F (36.7 °C)  Pulse:  [] " "102  Resp:  [6-26] 16  SpO2:  [96 %-99 %] 97 %  BP: (140-167)/() 140/101     Weight: 83.9 kg (185 lb)  Height: 5' 11" (180.3 cm)  Body mass index is 25.8 kg/m².      Intake/Output Summary (Last 24 hours) at 3/30/2022 1942  Last data filed at 3/30/2022 1700  Gross per 24 hour   Intake 1020 ml   Output 2426 ml   Net -1406 ml       Ortho/SPM Exam  Gen:  No acute distress  CV:  Peripherally well-perfused.    Lungs:  Normal respiratory effort.  Head/Neck:  Normocephalic.  Atraumatic.    MSK:  LUE:  - Skin intact throughout, no open wounds  - No swelling  - No ecchymosis, erythema, or signs of cellulitis  - NonTTP throughout  - AROM and PROM of the shoulder, elbow, wrist, and hand intact without pain  - Axillary/AIN/PIN/Radial/Median/Ulnar Nerves assessed in isolation without deficit  - SILT throughout  - Compartments soft  - Radial artery palpated   - Capillary Refill < 3s    RUE:  - Skin intact throughout, no open wounds  - No swelling  - No ecchymosis, erythema, or signs of cellulitis  - NonTTP throughout  - AROM and PROM of the shoulder, elbow, wrist, and hand intact without pain  - Axillary/AIN/PIN/Radial/Median/Ulnar Nerves assessed in isolation without deficit  - SILT throughout  - Compartments soft  - Radial artery palpated   - Capillary Refill < 3s    LLE:  - Cast removed  - Skin intact throughout, no open wounds  - No swelling  - Minimal ecchymoses over lateral aspect of foot  - Mild TTP over lateral aspect of foot at the base of the 5th metatarsal to the proximal phalanx of the 5th toe  - AROM and PROM of the hip, knee, ad ankle intact without pain  - TA/EHL/Gastroc/FHL assessed in isolation without deficit  - SILT throughout  - Compartments soft  - DP and PT palpated  - Capillary Refill < 3s    RLE:  - Skin intact throughout, no open wounds  - No swelling  - No ecchymosis, erythema, or signs of cellulitis  - NonTTP throughout  - AROM and PROM of the hip, knee, ankle, and foot intact without pain  - " TA/EHL/Gastroc/FHL assessed in isolation without deficit  - SILT throughout  - Compartments soft  - DP and PT palpated  - Capillary Refill < 3s    Significant Labs: All pertinent labs within the past 24 hours have been reviewed.    Significant Imaging: I have reviewed and interpreted all pertinent imaging results/findings.  Left foot X-ray with nondisplaced oblique fracture of the 5th proximal phalanx and a nondisplaced fracture of the 5th metatarsal
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
## 2022-03-31 NOTE — HOSPITAL COURSE
Patient admitted to Hospital Medicine on 3/29 for ETOH detoxification for which Addiction Psychiatry was consulted and patient being managed in the usual fashion with benzodiazepine taper. He was evaluated by Orthopedic Surgery with repeat imagining and replacement of cast on 3/30. Spacing out Valium taper per Addiction Psychiatry recommendations. On 4/1 am pt wishing to leave AMA. Night team evaluated pt, pt verbalized understanding of risks, was provided resources for rehab, and discharged AMA. Please see care update note for full details.

## 2022-03-31 NOTE — ASSESSMENT & PLAN NOTE
Patient with high serum alcohol levels. Ketones positive on U/A. Patient with likely alcoholic ketoacidosis. Patient dry on examination.     Plan:  --thiamine/folate  --hold insulin for now in setting of possible AKA  --replace electrolytes K>4 Mg >2
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
## 2022-03-31 NOTE — ASSESSMENT & PLAN NOTE
Patient notes that he drinks 1 gallon of alcohol a day. Patient interested in getting help with detox. He is interested in seeing addiction psychiatry when brought up to him. Concern for withdrawal, physical exam showing tremors and he is tachycardic. 5mg IV diazepam given in the ED. Patient recently treated for alcohol withdrawal in feb 2022.    PLAN:  --Addiction Psych consulted  --CIWA protocol  --diazepam 10mg IV q8h  --Lorazepam 2mg IV q4h prn for CIWA >8  --Vistaril and melatonin added for insomnia and anxiety  --Seizure precautions  --Fall precautions  --Q4h vital signs  --Thiamine, folate, MV
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
## 2022-03-31 NOTE — SUBJECTIVE & OBJECTIVE
"    Family History    None       Tobacco Use    Smoking status: Current Every Day Smoker    Smokeless tobacco: Never Used   Substance and Sexual Activity    Alcohol use: Yes     Comment: "a gallon a day"    Drug use: Not Currently    Sexual activity: Not on file     Psychotherapeutics (From admission, onward)                Start     Stop Route Frequency Ordered    03/29/22 1500  diazePAM injection 10 mg         -- IV Every 6 hours (non-standard times) 03/29/22 1036    03/29/22 1135  lorazepam injection 2 mg         -- IV Every 4 hours PRN 03/29/22 1101             Review of Systems   Constitutional:  Negative for appetite change and fever.   HENT:  Negative for trouble swallowing.    Eyes:  Negative for discharge.   Respiratory:  Negative for shortness of breath.    Cardiovascular:  Negative for chest pain.   Gastrointestinal:  Negative for abdominal pain, constipation, nausea and vomiting.   Endocrine: Negative for cold intolerance and heat intolerance.   Musculoskeletal:  Negative for neck pain.   Skin:  Negative for color change.   Neurological:  Negative for dizziness and headaches.   Psychiatric/Behavioral:  Positive for sleep disturbance. Negative for agitation, behavioral problems, confusion, decreased concentration, dysphoric mood, hallucinations, self-injury and suicidal ideas. The patient is not hyperactive.    Objective:     Vital Signs (Most Recent):  Temp: 97.7 °F (36.5 °C) (03/31/22 0800)  Pulse: 99 (03/31/22 0800)  Resp: 14 (03/31/22 0800)  BP: (!) 147/108 (03/31/22 0800)  SpO2: 98 % (03/31/22 0800)   Vital Signs (24h Range):  Temp:  [97.7 °F (36.5 °C)-98.5 °F (36.9 °C)] 97.7 °F (36.5 °C)  Pulse:  [] 99  Resp:  [14] 14  SpO2:  [98 %] 98 %  BP: (140-167)/() 147/108     Height: 5' 11" (180.3 cm)  Weight: 83.9 kg (185 lb)  Body mass index is 25.8 kg/m².      Intake/Output Summary (Last 24 hours) at 3/31/2022 0905  Last data filed at 3/31/2022 0200  Gross per 24 hour   Intake 780 ml   Output " "2001 ml   Net -1221 ml     Mental Status Exam:  Appearance: unremarkable, age appropriate  Level of Consciousness:  Awake, alert  Behavior/Cooperation: normal, cooperative  Psychomotor: unremarkable   Speech: normal tone, normal rate, normal pitch, normal volume  Language: english, fluid  Orientation: grossly intact  Attention Span/Concentration: intact  Memory: Intact  Mood: "a bit better"  Affect: normal  Thought Process: linear, normal and logical  Associations: normal and logical  Thought Content: normal, no suicidality, no homicidality, delusions, or paranoia  Fund of Knowledge: Aware of current events  Insight: fair  Judgment: fair      Physical Exam  Constitutional:       Appearance: Normal appearance.   HENT:      Head: Normocephalic and atraumatic.      Nose: Nose normal.      Mouth/Throat:      Pharynx: Oropharynx is clear.   Eyes:      Extraocular Movements: Extraocular movements intact.      Conjunctiva/sclera: Conjunctivae normal.   Pulmonary:      Effort: Pulmonary effort is normal.   Musculoskeletal:      Cervical back: Normal range of motion.   Skin:     General: Skin is dry.   Neurological:      Mental Status: He is alert and oriented to person, place, and time.   Psychiatric:         Thought Content: Thought content normal.     NEUROLOGICAL EXAMINATION:     MENTAL STATUS   Oriented to person, place, and time.   Significant Labs: Last 24 Hours:   Recent Lab Results         03/31/22  0325   03/30/22  2203        Albumin 3.4         Alkaline Phosphatase 52         ALT 33         Anion Gap 12         AST 35         Baso # 0.05         Basophil % 0.7         BILIRUBIN TOTAL 0.4  Comment: For infants and newborns, interpretation of results should be based  on gestational age, weight and in agreement with clinical  observations.    Premature Infant recommended reference ranges:  Up to 24 hours.............<8.0 mg/dL  Up to 48 hours............<12.0 mg/dL  3-5 days..................<15.0 mg/dL  6-29 " days.................<15.0 mg/dL           BUN 15         Calcium 9.8         Chloride 100         CO2 22         Creatinine 0.8         Differential Method Automated         eGFR if  >60.0         eGFR if non  >60.0  Comment: Calculation used to obtain the estimated glomerular filtration  rate (eGFR) is the CKD-EPI equation.            Eos # 0.2         Eosinophil % 2.9         Glucose 251         Gran # (ANC) 4.4         Gran % 64.6         Hematocrit 40.2         Hemoglobin 13.7         Immature Grans (Abs) 0.03  Comment: Mild elevation in immature granulocytes is non specific and   can be seen in a variety of conditions including stress response,   acute inflammation, trauma and pregnancy. Correlation with other   laboratory and clinical findings is essential.           Immature Granulocytes 0.4         Lymph # 1.4         Lymph % 20.4         Magnesium 1.9         MCH 28.2         MCHC 34.1         MCV 83         Mono # 0.8         Mono % 11.0         MPV 10.8         nRBC 0         Phosphorus 4.4         Platelets 102         POCT Glucose   259       Potassium 3.6         PROTEIN TOTAL 6.4         RBC 4.86         RDW 12.5         Sodium 134         WBC 6.82                 Significant Imaging: I have reviewed all pertinent imaging results/findings within the past 24 hours.

## 2022-03-31 NOTE — ASSESSMENT & PLAN NOTE
Patient takes metformin at home.     Lab Results   Component Value Date    HGBA1C 7.1 (H) 02/04/2022     PLAN:  --POCT BG TIDWM + QHS  --detemir 5U and aspart 2U TIDWM with LDSSI  --Diabetic diet
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
## 2022-03-31 NOTE — ASSESSMENT & PLAN NOTE
ASSESSMENT:     DIAGNOSES & PROBLEMS  Alcohol use disorder, severe      STRENGTHS AND LIABILITIES   Strength: Patient accepts guidance/feedback      MOTIVATION TO PURSUE RECOVERY: moderate    ACCEPTANCE OF ADDICTION: fair    ABILITY TO ADHERE TO TREATMENT PLAN: moderate      PLAN:       MANAGEMENT PLAN, TREATMENT GOALS, THERAPEUTIC TECHNIQUES/APPROACHES & CLINICAL REASONING    Recommend continuing valium to 10 mg q6hrs IV with IV ativan PRN q4hrs for CIWA>8.  Please document when pt is receiving PRN ativan, if it is for CIWA>8 vs anxiety alone.      · Patient counseled on abstinence from alcohol and substances of abuse (illicit and prescription).  · Additional workup planned to address substance use disorder, in order to guide and refine ongoing management options, includes serial alcohol and drug laboratory testing (e.g. PETH, urine toxicology).  · Relapse prevention and motivational interviewing provided.  · Education provided on 12 step recovery programs.      PRESCRIPTION DRUG MANAGEMENT  - The risks and benefits of medication were discussed with this patient.  - Possible expectable adverse effects of any current or proposed individual psychotropic agents were discussed with this patient.  - Counseling was provided on the importance of full compliance with medication regimens.      In cases of emergency, daily coverage provided by Acute/ER Psych MD, NP, or SW, with contact numbers located in [facility] On Call Schedule    Case discussed with staff addiction psychiatrist: ELIJAH BEAVER MD

## 2022-03-31 NOTE — ASSESSMENT & PLAN NOTE
"On lisinopril at home.    BP today: /77   Pulse 96   Temp 97.8 °F (36.6 °C) (Oral)   Resp 16   Ht 5' 11" (1.803 m)   Wt 83.9 kg (185 lb)   SpO2 95%   BMI 25.80 kg/m²     PLAN:  --Monitor BP -- has been labile  --Continue [facility] protocol  --Resumed home dose lisinopril 20 mg daily on 3/31  "

## 2022-04-01 VITALS
BODY MASS INDEX: 25.9 KG/M2 | WEIGHT: 185 LBS | SYSTOLIC BLOOD PRESSURE: 126 MMHG | RESPIRATION RATE: 18 BRPM | HEART RATE: 96 BPM | TEMPERATURE: 99 F | HEIGHT: 71 IN | DIASTOLIC BLOOD PRESSURE: 72 MMHG | OXYGEN SATURATION: 95 %

## 2022-04-01 NOTE — CARE UPDATE
"Called to bedside by RN at 3:30am on 4/1/22. Pt insisting he wants to leave. He knows this process well, he states "I want to leave against medical advice or whatever you call it." He says he is "going crazy" in his room and needs to get out of the hospital and go home. Pt states "nothing against you guys, you all are very nice but I need to get out of here." Upon discussion with patients about the importance of staying and finishing valium taper, patient reports he doesn't care and still wants to leave. He understands and verbalized the risks of leaving AMA while undergoing treatment for alcohol withdrawal including seizures and death. Further attempts to keep patient here for continued treatment were unsuccessful. Patient is not currently PEC'd and is ok to leave AMA. Patient was provided information regarding several rehab centers in the area. Pt was advised to abstain from alcohol going forward.     Rafal Monroy MD  Internal Medicine PGY-1  [facility]    "

## 2022-04-01 NOTE — PLAN OF CARE
Patient a&ox4, vss this shift. Prn ativan given x2 for ciwa. Plan of care reviewed with patient.     Problem: Adult Inpatient Plan of Care  Goal: Plan of Care Review  Outcome: Ongoing, Progressing  Goal: Absence of Hospital-Acquired Illness or Injury  Outcome: Ongoing, Progressing  Goal: Optimal Comfort and Wellbeing  Outcome: Ongoing, Progressing  Goal: Readiness for Transition of Care  Outcome: Ongoing, Progressing     Problem: Diabetes Comorbidity  Goal: Blood Glucose Level Within Targeted Range  Outcome: Ongoing, Progressing     Problem: Skin Injury Risk Increased  Goal: Skin Health and Integrity  Outcome: Ongoing, Progressing
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
## 2022-04-01 NOTE — NURSING
Pt states wants to leave ama, notified on call med 3. Signed and witnessed ama paper work. Removed iv.

## 2022-04-25 ENCOUNTER — PATIENT OUTREACH (OUTPATIENT)
Dept: ADMINISTRATIVE | Facility: OTHER | Age: 37
End: 2022-04-25
Payer: MEDICAID

## 2022-04-26 NOTE — PROGRESS NOTES
CHW - Initial Contact    Spoke with Sampson Cook via telephone today.    Pt identified barriers of most importance are: financial assistance  Referrals to community agencies completed with patient/caregiver consent outside of [facility] include: N/A  Referrals were put through [facility] - Yes  Other information discussed the patient needs / wants help with: possibly food stamps if approved  Follow up: 5/3/22

## 2022-05-16 ENCOUNTER — PATIENT OUTREACH (OUTPATIENT)
Dept: ADMINISTRATIVE | Facility: OTHER | Age: 37
End: 2022-05-16

## 2022-06-20 NOTE — PROGRESS NOTES
CHW - Follow Up    Completed a follow up visit with Sampson via telephone today.  Pt reported: received food stamp card, but still had questions  Community Health Worker provided: gave number for Food bank and number to ask questions regarding food stamps.  Follow-up Outreach - Due: 7/8/2022
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
## 2022-10-04 ENCOUNTER — PATIENT OUTREACH (OUTPATIENT)
Dept: ADMINISTRATIVE | Facility: OTHER | Age: 37
End: 2022-10-04
Payer: MEDICAID

## 2022-10-04 NOTE — PROGRESS NOTES
CHW - Follow Up and CHW - Case Closure    Completed a follow up visit with Sampson via telephone today.  Pt reported: looking for jobs and needs to call Dr for more meds  Pt denied any additional needs at this time and agrees with episode closure at this time.  Provided patient with my contact information and encouraged him to contact me if additional needs arise.

## 2023-02-19 ENCOUNTER — HOSPITAL ENCOUNTER (EMERGENCY)
Facility: HOSPITAL | Age: 38
Discharge: HOME | End: 2023-02-20
Attending: EMERGENCY MEDICINE
Payer: MEDICAID

## 2023-02-19 DIAGNOSIS — F10.920 ALCOHOLIC INTOXICATION WITHOUT COMPLICATION: Primary | ICD-10-CM

## 2023-02-19 LAB
ANISOCYTOSIS BLD QL SMEAR: SLIGHT
BASOPHILS # BLD AUTO: 0.06 K/UL (ref 0–0.2)
BASOPHILS NFR BLD: 0.6 % (ref 0–1.9)
DIFFERENTIAL METHOD: ABNORMAL
EOSINOPHIL # BLD AUTO: 0.1 K/UL (ref 0–0.5)
EOSINOPHIL NFR BLD: 1 % (ref 0–8)
ERYTHROCYTE [DISTWIDTH] IN BLOOD BY AUTOMATED COUNT: 12.7 % (ref 11.5–14.5)
HCT VFR BLD AUTO: 49.2 % (ref 40–54)
HGB BLD-MCNC: 17 G/DL (ref 14–18)
HYPOCHROMIA BLD QL SMEAR: ABNORMAL
IMM GRANULOCYTES # BLD AUTO: 0.02 K/UL (ref 0–0.04)
IMM GRANULOCYTES NFR BLD AUTO: 0.2 % (ref 0–0.5)
LYMPHOCYTES # BLD AUTO: 2.4 K/UL (ref 1–4.8)
LYMPHOCYTES NFR BLD: 26.2 % (ref 18–48)
MCH RBC QN AUTO: 28 PG (ref 27–31)
MCHC RBC AUTO-ENTMCNC: 34.6 G/DL (ref 32–36)
MCV RBC AUTO: 81 FL (ref 82–98)
MONOCYTES # BLD AUTO: 0.6 K/UL (ref 0.3–1)
MONOCYTES NFR BLD: 6.3 % (ref 4–15)
NEUTROPHILS # BLD AUTO: 6.1 K/UL (ref 1.8–7.7)
NEUTROPHILS NFR BLD: 65.7 % (ref 38–73)
NRBC BLD-RTO: 0 /100 WBC
PLATELET # BLD AUTO: 332 K/UL (ref 150–450)
PLATELET BLD QL SMEAR: ABNORMAL
PMV BLD AUTO: 9.6 FL (ref 9.2–12.9)
RBC # BLD AUTO: 6.07 M/UL (ref 4.6–6.2)
WBC # BLD AUTO: 9.24 K/UL (ref 3.9–12.7)

## 2023-02-19 PROCEDURE — 85025 COMPLETE CBC W/AUTO DIFF WBC: CPT | Performed by: EMERGENCY MEDICINE

## 2023-02-19 PROCEDURE — 99284 PR EMERGENCY DEPT VISIT,LEVEL IV: ICD-10-PCS | Mod: ,,, | Performed by: EMERGENCY MEDICINE

## 2023-02-19 PROCEDURE — U0002 COVID-19 LAB TEST NON-CDC: HCPCS | Performed by: EMERGENCY MEDICINE

## 2023-02-19 PROCEDURE — 99283 EMERGENCY DEPT VISIT LOW MDM: CPT | Mod: 25

## 2023-02-19 PROCEDURE — 87389 HIV-1 AG W/HIV-1&-2 AB AG IA: CPT | Performed by: PHYSICIAN ASSISTANT

## 2023-02-19 PROCEDURE — 86803 HEPATITIS C AB TEST: CPT | Performed by: PHYSICIAN ASSISTANT

## 2023-02-19 PROCEDURE — 99284 EMERGENCY DEPT VISIT MOD MDM: CPT | Mod: ,,, | Performed by: EMERGENCY MEDICINE

## 2023-02-19 PROCEDURE — 99285 EMERGENCY DEPT VISIT HI MDM: CPT | Mod: 25

## 2023-02-19 RX ORDER — IBUPROFEN 200 MG
1 TABLET ORAL DAILY
Status: DISCONTINUED | OUTPATIENT
Start: 2023-02-20 | End: 2023-02-20

## 2023-02-19 NOTE — Clinical Note
"Sampson Mcclain" Joey was seen and treated in our emergency department on 2/19/2023.  He may return to work on 02/21/2023.       If you have any questions or concerns, please don't hesitate to call.      Yuo Diaz MD"

## 2023-02-19 NOTE — Clinical Note
"Sampson Mcclain" Joey was seen and treated in our emergency department on 2/19/2023.  He may return to work on 02/21/2023.       If you have any questions or concerns, please don't hesitate to call.      You Diaz MD"

## 2023-02-20 VITALS
SYSTOLIC BLOOD PRESSURE: 118 MMHG | RESPIRATION RATE: 15 BRPM | BODY MASS INDEX: 26.5 KG/M2 | TEMPERATURE: 98 F | WEIGHT: 190.06 LBS | DIASTOLIC BLOOD PRESSURE: 60 MMHG | OXYGEN SATURATION: 99 % | HEART RATE: 80 BPM

## 2023-02-20 LAB
ALBUMIN SERPL BCP-MCNC: 4.1 G/DL (ref 3.5–5.2)
ALP SERPL-CCNC: 52 U/L (ref 55–135)
ALT SERPL W/O P-5'-P-CCNC: 29 U/L (ref 10–44)
AMPHET+METHAMPHET UR QL: NEGATIVE
ANION GAP SERPL CALC-SCNC: 15 MMOL/L (ref 8–16)
APAP SERPL-MCNC: <3 UG/ML (ref 10–20)
AST SERPL-CCNC: 19 U/L (ref 10–40)
BACTERIA #/AREA URNS AUTO: NORMAL /HPF
BARBITURATES UR QL SCN>200 NG/ML: NEGATIVE
BENZODIAZ UR QL SCN>200 NG/ML: NEGATIVE
BILIRUB SERPL-MCNC: 0.3 MG/DL (ref 0.1–1)
BILIRUB UR QL STRIP: NEGATIVE
BUN SERPL-MCNC: 9 MG/DL (ref 6–20)
BZE UR QL SCN: NEGATIVE
CALCIUM SERPL-MCNC: 9.1 MG/DL (ref 8.7–10.5)
CANNABINOIDS UR QL SCN: NEGATIVE
CHLORIDE SERPL-SCNC: 106 MMOL/L (ref 95–110)
CLARITY UR REFRACT.AUTO: CLEAR
CO2 SERPL-SCNC: 23 MMOL/L (ref 23–29)
COLOR UR AUTO: YELLOW
CREAT SERPL-MCNC: 0.8 MG/DL (ref 0.5–1.4)
CREAT UR-MCNC: 69 MG/DL (ref 23–375)
EST. GFR  (NO RACE VARIABLE): >60 ML/MIN/1.73 M^2
ETHANOL SERPL-MCNC: 302 MG/DL
GLUCOSE SERPL-MCNC: 244 MG/DL (ref 70–110)
GLUCOSE UR QL STRIP: ABNORMAL
HCV AB SERPL QL IA: NORMAL
HGB UR QL STRIP: NEGATIVE
HIV 1+2 AB+HIV1 P24 AG SERPL QL IA: NORMAL
KETONES UR QL STRIP: NEGATIVE
LEUKOCYTE ESTERASE UR QL STRIP: NEGATIVE
METHADONE UR QL SCN>300 NG/ML: NEGATIVE
MICROSCOPIC COMMENT: NORMAL
NITRITE UR QL STRIP: NEGATIVE
OPIATES UR QL SCN: NEGATIVE
PCP UR QL SCN>25 NG/ML: NEGATIVE
PH UR STRIP: 6 [PH] (ref 5–8)
POTASSIUM SERPL-SCNC: 3.4 MMOL/L (ref 3.5–5.1)
PROT SERPL-MCNC: 7.1 G/DL (ref 6–8.4)
PROT UR QL STRIP: NEGATIVE
RBC #/AREA URNS AUTO: 1 /HPF (ref 0–4)
SARS-COV-2 RDRP RESP QL NAA+PROBE: NEGATIVE
SODIUM SERPL-SCNC: 144 MMOL/L (ref 136–145)
SP GR UR STRIP: >1.03 (ref 1–1.03)
SQUAMOUS #/AREA URNS AUTO: 2 /HPF
T4 FREE SERPL-MCNC: 1.3 NG/DL (ref 0.71–1.51)
TOXICOLOGY INFORMATION: NORMAL
TSH SERPL DL<=0.005 MIU/L-ACNC: 0.18 UIU/ML (ref 0.4–4)
URN SPEC COLLECT METH UR: ABNORMAL
WBC #/AREA URNS AUTO: 1 /HPF (ref 0–5)
YEAST UR QL AUTO: NORMAL

## 2023-02-20 PROCEDURE — 84443 ASSAY THYROID STIM HORMONE: CPT | Performed by: EMERGENCY MEDICINE

## 2023-02-20 PROCEDURE — 25000003 PHARM REV CODE 250: Performed by: EMERGENCY MEDICINE

## 2023-02-20 PROCEDURE — 81001 URINALYSIS AUTO W/SCOPE: CPT | Mod: 59 | Performed by: EMERGENCY MEDICINE

## 2023-02-20 PROCEDURE — 80143 DRUG ASSAY ACETAMINOPHEN: CPT | Performed by: EMERGENCY MEDICINE

## 2023-02-20 PROCEDURE — 80053 COMPREHEN METABOLIC PANEL: CPT | Performed by: EMERGENCY MEDICINE

## 2023-02-20 PROCEDURE — 84439 ASSAY OF FREE THYROXINE: CPT | Performed by: EMERGENCY MEDICINE

## 2023-02-20 PROCEDURE — S4991 NICOTINE PATCH NONLEGEND: HCPCS | Performed by: EMERGENCY MEDICINE

## 2023-02-20 PROCEDURE — 82077 ASSAY SPEC XCP UR&BREATH IA: CPT | Performed by: EMERGENCY MEDICINE

## 2023-02-20 PROCEDURE — 80307 DRUG TEST PRSMV CHEM ANLYZR: CPT | Performed by: EMERGENCY MEDICINE

## 2023-02-20 RX ORDER — IBUPROFEN 200 MG
1 TABLET ORAL DAILY
Status: DISCONTINUED | OUTPATIENT
Start: 2023-02-20 | End: 2023-02-20 | Stop reason: HOSPADM

## 2023-02-20 RX ADMIN — Medication 1 PATCH: at 12:02

## 2023-02-20 NOTE — ED PROVIDER NOTES
"Encounter Date: 2/19/2023       History     Chief Complaint   Patient presents with    OTHER     Pt crying and stating "I'm not doing well, I feel like I'm dying". Smells of ETOH. Pt seen getting out of chair in lobby and laying on floor.     38-year-old male presenting with alcohol intoxication.    PMH:  Alcohol use disorder, DM type 2, alcohol ketoacidosis, hypertension, alcohol withdrawal    Context:  The patient states he has been sober for the last 4 months and lives in a sober group home.  He repeatedly states "I fucked up" and "I can't go on like this, I'm not right in the head."  Patient denies plan for self-harm.  He drank alcohol this evening.  He did not fall or hit his head.  Onset:  Gradual  Duration:  Today  Associated Symptoms:  Denies neck pain       The history is provided by the patient and medical records. The history is limited by the condition of the patient. No  was used.   Review of patient's allergies indicates:  No Known Allergies  Past Medical History:   Diagnosis Date    Alcoholism     Diabetes mellitus     Tobacco use      History reviewed. No pertinent surgical history.  History reviewed. No pertinent family history.  Social History     Tobacco Use    Smoking status: Every Day    Smokeless tobacco: Never   Substance Use Topics    Alcohol use: Yes     Comment: "a gallon a day"    Drug use: Not Currently     Review of Systems   Reason unable to perform ROS: Intoxication.     Physical Exam     Initial Vitals [02/19/23 2207]   BP Pulse Resp Temp SpO2   138/80 105 14 97.8 °F (36.6 °C) 95 %      MAP       --         Physical Exam    Nursing note and vitals reviewed.  Constitutional: He is not diaphoretic. No distress.   HENT:   Head: Normocephalic and atraumatic.   Eyes: Right eye exhibits no discharge. Left eye exhibits no discharge.   Neck: Neck supple. No tracheal deviation present.   Cardiovascular:  Regular rhythm.   Tachycardia present.         Pulmonary/Chest: Breath " sounds normal. No respiratory distress.   Abdominal: Abdomen is soft. There is no abdominal tenderness.   Musculoskeletal:         General: No tenderness.      Cervical back: Neck supple.      Comments: No C/T/L spinal tenderness     Neurological: He is alert and oriented to person, place, and time.   Skin: Skin is warm.   Psychiatric: He exhibits a depressed mood. He expresses no homicidal ideation. He expresses no suicidal plans.   Tearful       ED Course   Procedures  Labs Reviewed   CBC W/ AUTO DIFFERENTIAL - Abnormal; Notable for the following components:       Result Value    MCV 81 (*)     All other components within normal limits   URINALYSIS, REFLEX TO URINE CULTURE - Abnormal; Notable for the following components:    Specific Gravity, UA >1.030 (*)     Glucose, UA 4+ (*)     All other components within normal limits    Narrative:     Specimen Source->Urine   COMPREHENSIVE METABOLIC PANEL - Abnormal; Notable for the following components:    Potassium 3.4 (*)     Glucose 244 (*)     Alkaline Phosphatase 52 (*)     All other components within normal limits   TSH - Abnormal; Notable for the following components:    TSH 0.185 (*)     All other components within normal limits   ALCOHOL,MEDICAL (ETHANOL) - Abnormal; Notable for the following components:    Alcohol, Serum 302 (*)     All other components within normal limits   ACETAMINOPHEN LEVEL - Abnormal; Notable for the following components:    Acetaminophen (Tylenol), Serum <3.0 (*)     All other components within normal limits   HIV 1 / 2 ANTIBODY    Narrative:     Release to patient->Immediate   HEPATITIS C ANTIBODY    Narrative:     Release to patient->Immediate   DRUG SCREEN PANEL, URINE EMERGENCY    Narrative:     Specimen Source->Urine   SARS-COV-2 RNA AMPLIFICATION, QUAL   URINALYSIS MICROSCOPIC    Narrative:     Specimen Source->Urine   T4, FREE          Imaging Results    None          Medications - No data to display  Medical Decision Making:    History:   Old Medical Records: I decided to obtain old medical records.  Old Records Summarized: other records.       <> Summary of Records: Prior treatments for alcoholic ketoacidosis and alcohol intoxication.  Initial Assessment:   Emergent evaluation of a 38-year-old male presenting with alcohol intoxication.  Denies suicidal plan, but make several statements that are concerning that he may be a risk to his own personal safety.  Patient placed on direct psychiatric observation, per department protocol.  PEC filed out of concern patient may represent a danger to himself.  Plan to observe for clinical sobriety and will reassess his mental status.  No evidence of head injury.   Differential Diagnosis:   Including, but not limited to:    Alcohol intoxication  Substance induced mood disorder  Depression  Anxiety  Clinical Tests:   Lab Tests: Reviewed and Ordered  Radiological Study: Ordered and Reviewed  ED Management:    On re-evaluation, patient feeling significantly improved.  He is ambulating without assistance.  He is fully oriented and clinically sober.  Is not suicidal, he has no thoughts of self harm.  Patient no longer meets PEC criteria and PEC rescinded.   Safe for discharge with outpatient follow-up.  Patient is forward thinking, he wants to get food and go back to his sober living house. He is arranging an uber ride.     All questions answered prior to discharge.  Patient understands agrees with the plan.  Return precautions given. Promises to return to the ED if he develops any thoughts of self-harm or suicide.     ED Diagnoses:  Acute alcohol intoxication           ED Course as of 02/20/23 1447   Mon Feb 20, 2023   0006 WBC: 9.24  No leukocytosis  [AB]   0209 Alcohol, Serum(!!): 302 [AB]   0209 Ketones, UA: Negative  Negative [AB]   0209 Glucose(!): 244  Hyperglycemia  [AB]   0209 Anion Gap: 15  No ketonuria, normal bicarb, doubt DKA [AB]   0509 Reassessment:  sleeping soundly, wakes easily.  Feels  improved. Not ready to ambulate, will try again.  [AB]      ED Course User Index  [AB] You Diaz MD                 Clinical Impression:   Final diagnoses:  [F10.920] Alcoholic intoxication without complication (Primary)        ED Disposition Condition    Discharge Stable          ED Prescriptions    None       Follow-up Information       Follow up With Specialties Details Why Contact Info    Thais Ahuja, APRN Internal Medicine In 3 days  [address] [facility] [address]  [phone]      [facility] [address] - Emergency Dept Emergency Medicine  As needed, If symptoms worsen [address] [facility] [id number] [phone]             You Diaz MD  02/20/23 1440

## 2023-02-20 NOTE — ED NOTES
Pt sleeping on stretcher. Sitter remains at bedside in direct visual contact, charting per protocol every 15 minutes. No equipment or belongings are in the patients room.  Bed locked in lowest position; side rails up and locked x 2.

## 2023-02-20 NOTE — ED TRIAGE NOTES
"Sampson Cook, a 38 y.o. male presents to the ED w/ complaint of other. In the lobby pt states, "I'm not doing well, I think im dying". Smells of alcohol and laying on the floor in the lobby. Pt states, "I really messed up tonight". Denies SI and HI at this time.     Adult Physical Assessment  LOC: Sampson Cook, 38 y.o. male verified via two identifiers.  The patient is awake, alert, and oriented.  APPEARANCE: Patient resting comfortably and appears to be in no acute distress at this time. Patient's clothing is properly fastened. Patient smells of alcohol and has vomit on his sweatshirt.   SKIN:The skin is warm and dry, color consistent with ethnicity, patient has normal skin turgor and moist mucus membranes, skin intact, no breakdown or brusing noted.  MUSCULOSKELETAL: Patient moving all extremities well, no obvious swelling or deformities noted.  RESPIRATORY: Airway is open and patent, respirations are spontaneous, patient has a normal effort and rate, no accessory muscle use noted.  CARDIAC: Patient has a normal rate and rhythm, no periphreal edema noted in any extremity, capillary refill < 3 seconds in all extremities  ABDOMEN: Soft and non tender to palpation, no abdominal distention noted. Bowel sounds present in all four quadrants.  NEUROLOGIC: Eyes open spontaneously, behavior appropriate to situation, follows commands, facial expression symmetrical, bilateral hand grasp equal and even, purposeful motor response noted, normal sensation in all extremities when touched with a finger.      Triage note:  Chief Complaint   Patient presents with    OTHER     Pt crying and stating "I'm not doing well, I feel like I'm dying". Smells of ETOH. Pt seen getting out of chair in lobby and laying on floor.     Review of patient's allergies indicates:  No Known Allergies  Past Medical History:   Diagnosis Date    Alcoholism     Diabetes mellitus     Tobacco use        "

## 2023-02-20 NOTE — ED NOTES
Pt resting quietly on stretcher; remains calm and cooperative. Sitter remains at bedside in direct visual contact, charting per protocol every 15 minutes. No equipment or belongings are in the patients room.  Pt denies needs or complaints at this time.  Bed locked in lowest position; side rails up and locked x 2.  Pt instructed to alert sitter or nurse for assistance and before attempting to get out of bed; verbalizes understanding.

## 2023-02-20 NOTE — ED NOTES
Pt changed into paper scrubs and personal items collected.     Pts items include: jeans, sweatshirt, black shoes and socks, [place] pack filled with $15 dollars and two phone chargers.     Pt phone given to nurse and asked if it could be charged. Pt corporative.

## 2023-02-21 ENCOUNTER — HOSPITAL ENCOUNTER (EMERGENCY)
Facility: HOSPITAL | Age: 38
Discharge: HOME | End: 2023-02-21
Attending: EMERGENCY MEDICINE
Payer: MEDICAID

## 2023-02-21 ENCOUNTER — HOSPITAL ENCOUNTER (EMERGENCY)
Facility: HOSPITAL | Age: 38
Discharge: LEFT BEFORE DISCHARGE | End: 2023-02-21
Attending: EMERGENCY MEDICINE
Payer: MEDICAID

## 2023-02-21 VITALS
BODY MASS INDEX: 28 KG/M2 | DIASTOLIC BLOOD PRESSURE: 111 MMHG | TEMPERATURE: 98 F | WEIGHT: 200 LBS | RESPIRATION RATE: 20 BRPM | HEART RATE: 118 BPM | OXYGEN SATURATION: 99 % | HEIGHT: 71 IN | SYSTOLIC BLOOD PRESSURE: 160 MMHG

## 2023-02-21 VITALS
SYSTOLIC BLOOD PRESSURE: 130 MMHG | BODY MASS INDEX: 28 KG/M2 | RESPIRATION RATE: 22 BRPM | HEIGHT: 71 IN | OXYGEN SATURATION: 96 % | TEMPERATURE: 98 F | HEART RATE: 115 BPM | DIASTOLIC BLOOD PRESSURE: 91 MMHG | WEIGHT: 200 LBS

## 2023-02-21 DIAGNOSIS — R00.0 TACHYCARDIA: ICD-10-CM

## 2023-02-21 DIAGNOSIS — F10.930 ALCOHOL WITHDRAWAL SYNDROME WITHOUT COMPLICATION: ICD-10-CM

## 2023-02-21 DIAGNOSIS — F10.920 ALCOHOLIC INTOXICATION WITHOUT COMPLICATION: Primary | ICD-10-CM

## 2023-02-21 LAB
ALBUMIN SERPL BCP-MCNC: 4.3 G/DL (ref 3.5–5.2)
ALP SERPL-CCNC: 62 U/L (ref 55–135)
ALT SERPL W/O P-5'-P-CCNC: 35 U/L (ref 10–44)
ANION GAP SERPL CALC-SCNC: 14 MMOL/L (ref 8–16)
ANION GAP SERPL CALC-SCNC: 19 MMOL/L (ref 8–16)
AST SERPL-CCNC: 32 U/L (ref 10–40)
BASOPHILS # BLD AUTO: 0.11 K/UL (ref 0–0.2)
BASOPHILS NFR BLD: 1.1 % (ref 0–1.9)
BILIRUB SERPL-MCNC: 0.4 MG/DL (ref 0.1–1)
BUN SERPL-MCNC: 12 MG/DL (ref 6–20)
BUN SERPL-MCNC: 15 MG/DL (ref 6–20)
CALCIUM SERPL-MCNC: 8.9 MG/DL (ref 8.7–10.5)
CALCIUM SERPL-MCNC: 9.4 MG/DL (ref 8.7–10.5)
CHLORIDE SERPL-SCNC: 105 MMOL/L (ref 95–110)
CHLORIDE SERPL-SCNC: 105 MMOL/L (ref 95–110)
CO2 SERPL-SCNC: 21 MMOL/L (ref 23–29)
CO2 SERPL-SCNC: 24 MMOL/L (ref 23–29)
CREAT SERPL-MCNC: 0.8 MG/DL (ref 0.5–1.4)
CREAT SERPL-MCNC: 1.2 MG/DL (ref 0.5–1.4)
DIFFERENTIAL METHOD: ABNORMAL
EOSINOPHIL # BLD AUTO: 0.1 K/UL (ref 0–0.5)
EOSINOPHIL NFR BLD: 0.6 % (ref 0–8)
ERYTHROCYTE [DISTWIDTH] IN BLOOD BY AUTOMATED COUNT: 12.8 % (ref 11.5–14.5)
EST. GFR  (NO RACE VARIABLE): >60 ML/MIN/1.73 M^2
EST. GFR  (NO RACE VARIABLE): >60 ML/MIN/1.73 M^2
ETHANOL SERPL-MCNC: 168 MG/DL
ETHANOL SERPL-MCNC: 297 MG/DL
ETHANOL SERPL-MCNC: 356 MG/DL
GLUCOSE SERPL-MCNC: 182 MG/DL (ref 70–110)
GLUCOSE SERPL-MCNC: 209 MG/DL (ref 70–110)
HCT VFR BLD AUTO: 51.6 % (ref 40–54)
HGB BLD-MCNC: 17.3 G/DL (ref 14–18)
IMM GRANULOCYTES # BLD AUTO: 0.02 K/UL (ref 0–0.04)
IMM GRANULOCYTES NFR BLD AUTO: 0.2 % (ref 0–0.5)
LYMPHOCYTES # BLD AUTO: 3.7 K/UL (ref 1–4.8)
LYMPHOCYTES NFR BLD: 37.9 % (ref 18–48)
MCH RBC QN AUTO: 27.8 PG (ref 27–31)
MCHC RBC AUTO-ENTMCNC: 33.5 G/DL (ref 32–36)
MCV RBC AUTO: 83 FL (ref 82–98)
MONOCYTES # BLD AUTO: 0.6 K/UL (ref 0.3–1)
MONOCYTES NFR BLD: 6.3 % (ref 4–15)
NEUTROPHILS # BLD AUTO: 5.3 K/UL (ref 1.8–7.7)
NEUTROPHILS NFR BLD: 53.9 % (ref 38–73)
NRBC BLD-RTO: 0 /100 WBC
PLATELET # BLD AUTO: 337 K/UL (ref 150–450)
PMV BLD AUTO: 9.1 FL (ref 9.2–12.9)
POTASSIUM SERPL-SCNC: 3.3 MMOL/L (ref 3.5–5.1)
POTASSIUM SERPL-SCNC: 3.8 MMOL/L (ref 3.5–5.1)
PROT SERPL-MCNC: 7.7 G/DL (ref 6–8.4)
RBC # BLD AUTO: 6.23 M/UL (ref 4.6–6.2)
SARS-COV-2 RDRP RESP QL NAA+PROBE: NEGATIVE
SODIUM SERPL-SCNC: 143 MMOL/L (ref 136–145)
SODIUM SERPL-SCNC: 145 MMOL/L (ref 136–145)
TSH SERPL DL<=0.005 MIU/L-ACNC: 0.18 UIU/ML (ref 0.4–4)
WBC # BLD AUTO: 9.84 K/UL (ref 3.9–12.7)

## 2023-02-21 PROCEDURE — 25000003 PHARM REV CODE 250: Performed by: EMERGENCY MEDICINE

## 2023-02-21 PROCEDURE — 96375 TX/PRO/DX INJ NEW DRUG ADDON: CPT

## 2023-02-21 PROCEDURE — 99285 PR EMERGENCY DEPT VISIT,LEVEL V: ICD-10-PCS | Mod: CS,,, | Performed by: EMERGENCY MEDICINE

## 2023-02-21 PROCEDURE — 84443 ASSAY THYROID STIM HORMONE: CPT | Performed by: EMERGENCY MEDICINE

## 2023-02-21 PROCEDURE — 93005 ELECTROCARDIOGRAM TRACING: CPT

## 2023-02-21 PROCEDURE — 85025 COMPLETE CBC W/AUTO DIFF WBC: CPT | Performed by: EMERGENCY MEDICINE

## 2023-02-21 PROCEDURE — 99499 NO LOS: ICD-10-PCS | Mod: ,,, | Performed by: EMERGENCY MEDICINE

## 2023-02-21 PROCEDURE — 93010 EKG 12-LEAD: ICD-10-PCS | Mod: ,,, | Performed by: INTERNAL MEDICINE

## 2023-02-21 PROCEDURE — 99285 EMERGENCY DEPT VISIT HI MDM: CPT | Mod: CS,,, | Performed by: EMERGENCY MEDICINE

## 2023-02-21 PROCEDURE — 96361 HYDRATE IV INFUSION ADD-ON: CPT | Mod: 59

## 2023-02-21 PROCEDURE — 93010 ELECTROCARDIOGRAM REPORT: CPT | Mod: ,,, | Performed by: INTERNAL MEDICINE

## 2023-02-21 PROCEDURE — 63600175 PHARM REV CODE 636 W HCPCS: Performed by: EMERGENCY MEDICINE

## 2023-02-21 PROCEDURE — U0002 COVID-19 LAB TEST NON-CDC: HCPCS | Performed by: EMERGENCY MEDICINE

## 2023-02-21 PROCEDURE — 99499 UNLISTED E&M SERVICE: CPT | Mod: ,,, | Performed by: EMERGENCY MEDICINE

## 2023-02-21 PROCEDURE — 80048 BASIC METABOLIC PNL TOTAL CA: CPT | Mod: XB | Performed by: EMERGENCY MEDICINE

## 2023-02-21 PROCEDURE — 99284 EMERGENCY DEPT VISIT MOD MDM: CPT | Mod: 25

## 2023-02-21 PROCEDURE — 96365 THER/PROPH/DIAG IV INF INIT: CPT

## 2023-02-21 PROCEDURE — 99284 EMERGENCY DEPT VISIT MOD MDM: CPT | Mod: 25,27

## 2023-02-21 PROCEDURE — 80053 COMPREHEN METABOLIC PANEL: CPT | Performed by: EMERGENCY MEDICINE

## 2023-02-21 PROCEDURE — 96360 HYDRATION IV INFUSION INIT: CPT

## 2023-02-21 PROCEDURE — 82077 ASSAY SPEC XCP UR&BREATH IA: CPT | Mod: 91 | Performed by: EMERGENCY MEDICINE

## 2023-02-21 PROCEDURE — 82077 ASSAY SPEC XCP UR&BREATH IA: CPT | Performed by: EMERGENCY MEDICINE

## 2023-02-21 RX ORDER — CHLORDIAZEPOXIDE HYDROCHLORIDE 25 MG/1
50 CAPSULE, GELATIN COATED ORAL
Status: COMPLETED | OUTPATIENT
Start: 2023-02-21 | End: 2023-02-21

## 2023-02-21 RX ORDER — MAGNESIUM SULFATE HEPTAHYDRATE 40 MG/ML
2 INJECTION, SOLUTION INTRAVENOUS
Status: COMPLETED | OUTPATIENT
Start: 2023-02-21 | End: 2023-02-21

## 2023-02-21 RX ORDER — LORAZEPAM 2 MG/ML
2 INJECTION INTRAMUSCULAR
Status: COMPLETED | OUTPATIENT
Start: 2023-02-21 | End: 2023-02-21

## 2023-02-21 RX ORDER — B-COMPLEX WITH VITAMIN C
1 TABLET ORAL
Status: COMPLETED | OUTPATIENT
Start: 2023-02-21 | End: 2023-02-21

## 2023-02-21 RX ADMIN — LORAZEPAM 2 MG: 2 INJECTION INTRAMUSCULAR; INTRAVENOUS at 09:02

## 2023-02-21 RX ADMIN — SODIUM CHLORIDE, POTASSIUM CHLORIDE, SODIUM LACTATE AND CALCIUM CHLORIDE 1000 ML: 600; 310; 30; 20 INJECTION, SOLUTION INTRAVENOUS at 04:02

## 2023-02-21 RX ADMIN — SODIUM CHLORIDE, POTASSIUM CHLORIDE, SODIUM LACTATE AND CALCIUM CHLORIDE 1000 ML: 600; 310; 30; 20 INJECTION, SOLUTION INTRAVENOUS at 06:02

## 2023-02-21 RX ADMIN — MAGNESIUM SULFATE HEPTAHYDRATE 2 G: 40 INJECTION, SOLUTION INTRAVENOUS at 04:02

## 2023-02-21 RX ADMIN — Medication 1 TABLET: at 06:02

## 2023-02-21 RX ADMIN — CHLORDIAZEPOXIDE HYDROCHLORIDE 50 MG: 25 CAPSULE ORAL at 11:02

## 2023-02-21 RX ADMIN — PHENOBARBITAL SODIUM 130 MG: 130 INJECTION INTRAMUSCULAR; INTRAVENOUS at 05:02

## 2023-02-21 RX ADMIN — SODIUM CHLORIDE 1000 ML: 9 INJECTION, SOLUTION INTRAVENOUS at 12:02

## 2023-02-21 NOTE — ED PROVIDER NOTES
"Chief Complaint   Alcohol Intoxication (Pt was trying to check into [facility] for alcohol intoxication. Pt's Etoh: 0.29 with B/P: 159/115. )      History Of Present Illness   Sampson Cook is a 38 y.o. male presenting with alcohol intoxication.  The patient states that he would like to go to [facility] for detox, but they told him his alcohol level was too high so he needed to come to the ER to be cleared.  He is very sad because his wife just left him but he denies suicidal or homicidal ideation.      History obtained from: Patient    Review of patient's allergies indicates:  No Known Allergies    No current facility-administered medications on file prior to encounter.     Current Outpatient Medications on File Prior to Encounter   Medication Sig Dispense Refill    folic acid (FOLVITE) 1 MG tablet Take 1,000 mcg by mouth once daily.      ibuprofen (ADVIL,MOTRIN) 200 MG tablet Take 200 mg by mouth every 6 (six) hours as needed for Pain.      lisinopriL (PRINIVIL,ZESTRIL) 20 MG tablet Take 1 tablet (20 mg total) by mouth once daily. 90 tablet 3    metFORMIN (GLUCOPHAGE) 500 MG tablet Take 1,000 mg by mouth 2 (two) times a day.      multivitamin Tab Take 1 tablet by mouth once daily. 30 tablet 11    ondansetron (ZOFRAN-ODT) 4 MG TbDL Take 1 tablet (4 mg total) by mouth 3 (three) times daily. 10 tablet 0       Past History   As per HPI and below:  Past Medical History:   Diagnosis Date    Alcoholism     Diabetes mellitus     Tobacco use      No past surgical history on file.    Social History     Socioeconomic History    Marital status: Single   Tobacco Use    Smoking status: Every Day    Smokeless tobacco: Never   Substance and Sexual Activity    Alcohol use: Yes     Comment: "a gallon a day"    Drug use: Not Currently     Social Determinants of Health     Financial Resource Strain: High Risk    Difficulty of Paying Living Expenses: Very hard   Food Insecurity: Food Insecurity Present    Worried About Running Out " "of Food in the Last Year: Often true    Ran Out of Food in the Last Year: Often true   Transportation Needs: No Transportation Needs    Lack of Transportation (Medical): No    Lack of Transportation (Non-Medical): No   Physical Activity: Inactive    Days of Exercise per Week: 0 days    Minutes of Exercise per Session: 0 min   Stress: No Stress Concern Present    Feeling of Stress : Not at all   Social Connections: Moderately Isolated    Frequency of Communication with Friends and Family: More than three times a week    Frequency of Social Gatherings with Friends and Family: More than three times a week    Attends Confucianist Services: More than 4 times per year    Active Member of Clubs or Organizations: No    Attends Club or Organization Meetings: Never    Marital Status: Never    Housing Stability: High Risk    Unable to Pay for Housing in the Last Year: Yes    Number of Places Lived in the Last Year: 1    Unstable Housing in the Last Year: No       No family history on file.    Physical Exam     Vitals:    02/21/23 1205   BP: (!) 160/111   Pulse: (!) 118   Resp: 20   Temp: 98.2 °F (36.8 °C)   TempSrc: Oral   SpO2: 99%   Weight: 90.7 kg (200 lb)   Height: 5' 11" (1.803 m)     Appearance: No acute distress.  Skin: No rashes seen.  Good turgor.  No abrasions.  No ecchymoses.  Eyes: No conjunctival injection.  ENT: Oropharynx clear.    Chest: Clear to auscultation bilaterally.  Good air movement.  No wheezes.  No rhonchi.  Cardiovascular: Regular rate and rhythm.  No murmurs. No gallops. No rubs.  Abdomen: Soft.  Not distended.  Nontender.  No guarding.  No rebound.  Musculoskeletal: Good range of motion all joints.  No deformities.  Neck supple.  No meningismus.  Neurologic: Motor intact.  Sensation intact.  Cranial nerves intact.  Slurred speech.  Mental Status:  Alert and oriented x 3.  Appropriate, conversant.      Initial MDM   Clinically intoxicated, blue 0.29 at [facility], sent here for clearance for " alcohol detox.  Will check labs and follow alcohol level, then sent back to detox.  No homicidal or suicidal ideation.  No psychosis.  He does not meet criteria for involuntary commitment.    Medications Given     Medications   sodium chloride 0.9% bolus 1,000 mL 1,000 mL (0 mLs Intravenous Stopped 2/21/23 1420)       Results and Course     Labs Reviewed   CBC W/ AUTO DIFFERENTIAL - Abnormal; Notable for the following components:       Result Value    RBC 6.23 (*)     MPV 9.1 (*)     All other components within normal limits   COMPREHENSIVE METABOLIC PANEL - Abnormal; Notable for the following components:    CO2 21 (*)     Glucose 182 (*)     Anion Gap 19 (*)     All other components within normal limits   ALCOHOL,MEDICAL (ETHANOL) - Abnormal; Notable for the following components:    Alcohol, Serum 297 (*)     All other components within normal limits   TSH - Abnormal; Notable for the following components:    TSH 0.178 (*)     All other components within normal limits   SARS-COV-2 RNA AMPLIFICATION, QUAL   DRUG SCREEN PANEL, URINE EMERGENCY   URINALYSIS, REFLEX TO URINE CULTURE   T4, FREE       Imaging Results    None         ED Course as of 02/21/23 1451   Tue Feb 21, 2023   1300 EKG 12-lead  EKG shows normal sinus rhythm and no acute ischemia per my independent interpretation.     [DC]      ED Course User Index  [DC] Severo Amaya MD           MDM, Impression and Plan   38 y.o. male with alcohol intoxication.  Patient removed his IV and left.  The nurse signed him out AMA and let him go.  I was unable to make it to him in time to discuss the risks of leaving against medical advice as I was not informed of the situation until after he left.  He did not meet criteria for involuntary commitment, thus I could not hold him here against his will.  He was ambulating on his own when he left.         Final diagnoses:  [R00.0] Tachycardia  [F10.920] Alcoholic intoxication without complication (Primary)        ED  Disposition Condition    AMA                   Severo Amaya MD  02/21/23 1750

## 2023-02-21 NOTE — ED NOTES
Pt identifiers Sampson Cook were checked and are correct  LOC: The patient is awake, alert, aware of environment with an appropriate affect. Oriented X4  APPEARANCE: Pt resting comfortably, in no acute distress,unkempt  SKIN: Skin warm, dry and intact, normal skin turgor, moist mucus membranes  RESPIRATORY: Airway is open and patent, respirations are spontaneous, even and unlabored, normal effort and rate  CARDIAC: Normal rate and rhythm, no peripheral edema noted, capillary refill < 3 seconds, bilateral radial pulses 2+  ABDOMEN: Soft, nontender, nondistended. Bowel sounds present to allfour quad of abd on auscultation  NEUROLOGIC: PERRL, facial expression is symmetrical, patient moving all extremities spontaneously, normal sensation in all extremities when touched with a finger.  Follows all commands appropriately  MUSCULOSKELETAL: No obvious deformities.

## 2023-02-21 NOTE — ED TRIAGE NOTES
"Pt to the ed from [facility] via ems with a CC of alcohol intoxication. Pt relays she is " sad because his wife left him so he got really drunk." Pt denies SI or HI. Pt relays we wanted to check into [facility] to get help and they sent him here for elevated alcohol and blood pressure. Pt denies any pain any where or any other medical complaints.   "

## 2023-02-21 NOTE — ED NOTES
Patient requesting to leave AMA. Attempts to re-direct patient/vocalize concerns made by RN without success. Patient oriented, alert, but argumentative and hostile. Refuses vitals. MD notified. Patient verbalizes understanding of risk, paperwork completed.

## 2023-02-21 NOTE — ED PROVIDER NOTES
"Chief Complaint   Alcohol Intoxication (Wants to go to [facility] states just left and drank alcohol )      History Of Present Illness   Sampson Cook is a 38 y.o. male presenting with alcohol intoxication.  He was here earlier today, then left AMA to go drink some more.  He returned because he would like to good [employer] again.  He complains of feeling sad but denies homicidal or suicidal ideation.  Denies any other substance use.      History obtained from: Patient    Review of patient's allergies indicates:  No Known Allergies    Current Facility-Administered Medications on File Prior to Encounter   Medication Dose Route Frequency Provider Last Rate Last Admin    [COMPLETED] sodium chloride 0.9% bolus 1,000 mL 1,000 mL  1,000 mL Intravenous ED 1 Time Severo Amaya MD   Stopped at 02/21/23 1420     Current Outpatient Medications on File Prior to Encounter   Medication Sig Dispense Refill    folic acid (FOLVITE) 1 MG tablet Take 1,000 mcg by mouth once daily.      multivitamin Tab Take 1 tablet by mouth once daily. 30 tablet 11    ibuprofen (ADVIL,MOTRIN) 200 MG tablet Take 200 mg by mouth every 6 (six) hours as needed for Pain.      lisinopriL (PRINIVIL,ZESTRIL) 20 MG tablet Take 1 tablet (20 mg total) by mouth once daily. 90 tablet 3    metFORMIN (GLUCOPHAGE) 500 MG tablet Take 1,000 mg by mouth 2 (two) times a day.      ondansetron (ZOFRAN-ODT) 4 MG TbDL Take 1 tablet (4 mg total) by mouth 3 (three) times daily. 10 tablet 0       Past History   As per HPI and below:  Past Medical History:   Diagnosis Date    Alcoholism     Diabetes mellitus     Tobacco use      History reviewed. No pertinent surgical history.    Social History     Socioeconomic History    Marital status: Single   Tobacco Use    Smoking status: Every Day     Types: Cigarettes    Smokeless tobacco: Never   Substance and Sexual Activity    Alcohol use: Yes     Comment: "a gallon a day"    Drug use: Not Currently    Sexual activity: Yes     " Partners: Female     Social Determinants of Health     Financial Resource Strain: High Risk    Difficulty of Paying Living Expenses: Very hard   Food Insecurity: Food Insecurity Present    Worried About Running Out of Food in the Last Year: Often true    Ran Out of Food in the Last Year: Often true   Transportation Needs: No Transportation Needs    Lack of Transportation (Medical): No    Lack of Transportation (Non-Medical): No   Physical Activity: Inactive    Days of Exercise per Week: 0 days    Minutes of Exercise per Session: 0 min   Stress: No Stress Concern Present    Feeling of Stress : Not at all   Social Connections: Moderately Isolated    Frequency of Communication with Friends and Family: More than three times a week    Frequency of Social Gatherings with Friends and Family: More than three times a week    Attends Spiritism Services: More than 4 times per year    Active Member of Clubs or Organizations: No    Attends Club or Organization Meetings: Never    Marital Status: Never    Housing Stability: High Risk    Unable to Pay for Housing in the Last Year: Yes    Number of Places Lived in the Last Year: 1    Unstable Housing in the Last Year: No       History reviewed. No pertinent family history.    Physical Exam     Vitals:    02/21/23 2018 02/21/23 2033 02/21/23 2048 02/21/23 2314   BP: (!) 105/59 (!) 136/94 (!) 130/91 (!) 130/91   BP Location:   Right arm Right arm   Patient Position:   Sitting Sitting   Pulse: 103 (!) 115 104 (!) 115   Resp:   (!) 22 (!) 22   Temp:   97.7 °F (36.5 °C) 97.7 °F (36.5 °C)   TempSrc:   Oral Oral   SpO2: 95% (!) 94% 96%    Weight:       Height:         Appearance: No acute distress.  Skin: No rashes seen.  Good turgor.  No abrasions.  No ecchymoses.  Eyes: No conjunctival injection.  ENT: Oropharynx clear.    Chest: Clear to auscultation bilaterally.  Good air movement.  No wheezes.  No rhonchi.  Cardiovascular: Tachycardia.  No murmurs. No gallops. No  "rubs.  Abdomen: Soft.  Not distended.  Nontender.  No guarding.  No rebound.  Musculoskeletal: Good range of motion all joints.  No deformities.  Neck supple.  No meningismus.  Neurologic: Motor intact.  Sensation intact.  Cranial nerves intact.  Mental Status:  Alert and oriented x 3.  Appropriate, conversant.      Initial MDM   Alcohol intoxication, worsened (likely) after his ED visit shortly prior to this visit.  Will check alcohol level and trend towards sobriety.  He is tachycardic, will give fluids and reassess.  His labs showed slight anion gap, likely from alcohol use, at previous visit today.  He had no urine ketones; I doubt alcoholic ketoacidosis.    Medications Given     Medications   lactated ringers bolus 1,000 mL (0 mLs Intravenous Stopped 2/21/23 1700)   B-complex with vitamin C tablet 1 tablet (1 tablet Oral Given 2/21/23 1812)   magnesium sulfate 2g in water 50mL IVPB (premix) (0 g Intravenous Stopped 2/21/23 1635)   phenobarbital (LUMINAL) 130 mg in sodium chloride 0.9% 100 mL IVPB (0 mg Intravenous Stopped 2/21/23 1759)   lactated ringers bolus 1,000 mL (0 mLs Intravenous Stopped 2/21/23 1905)   LORazepam injection 2 mg (2 mg Intravenous Given 2/21/23 2132)   chlordiazepoxide capsule 50 mg (50 mg Oral Given 2/21/23 2307)       Results and Course     Labs Reviewed   ALCOHOL,MEDICAL (ETHANOL) - Abnormal; Notable for the following components:       Result Value    Alcohol, Serum 356 (*)     All other components within normal limits   ALCOHOL,MEDICAL (ETHANOL) - Abnormal; Notable for the following components:    Alcohol, Serum 168 (*)     All other components within normal limits   BASIC METABOLIC PANEL - Abnormal; Notable for the following components:    Potassium 3.3 (*)     Glucose 209 (*)     All other components within normal limits       Imaging Results    None         ED Course as of 02/22/23 0941   Tue Feb 21, 2023 1718 Patient wandering around demanding another IV bag; "I can go get it " "myself" [DC]   1805 Alcohol, Serum(!!): 356 [DC]   1900 Tolerated food and drink with no issues [DC]      ED Course User Index  [DC] Severo Amaya MD           MDM, Impression and Plan   38 y.o. male with alcohol withdrawal, alcohol intoxication and overall poor behavior, but not gravely disabled.  Labs from 1st visit today not suggestive of alcoholic ketoacidosis.  Awaiting sobriety suitable for [facility] detox program.  Turned over to Dr. Atkinson pending repeat labs and re-evaluation.         Final diagnoses:  [R00.0] Tachycardia  [F10.920] Alcoholic intoxication without complication (Primary)  [F10.930] Alcohol withdrawal syndrome without complication        ED Disposition Condition    Discharge Stable          ED Prescriptions    None       Follow-up Information       Follow up With Specialties Details Why Contact Info    [facility] Emergency Medicine  As needed [address]  [facility] [id number]  [phone]               Severo Amaya MD  02/22/23 0902    "

## 2023-02-21 NOTE — ED TRIAGE NOTES
"Pt left the ED earlier today after coming in with ETOH abuse and returned to the ED requesting to be admitted to [facility]  States " I drank a whole lot of [place]"  "

## 2023-02-22 NOTE — PROVIDER PROGRESS NOTES - EMERGENCY DEPT.
Encounter Date: 2/21/2023    ED Physician Progress Notes        ED Physician Hand-off Note:    ED Course: I assumed care of patient from off-going ED physician team. Briefly, Patient is a 38-year-old male with alcohol dependence who was sent from [facility] for elevated alcohol level.  He received a dose of phenobarbital IV  At the time of signout plan was pending repeat, bmp, etoh.  Plan to go to [facility]    Medications given in the ED:    Medications   chlordiazepoxide capsule 50 mg (has no administration in time range)   lactated ringers bolus 1,000 mL (0 mLs Intravenous Stopped 2/21/23 1700)   B-complex with vitamin C tablet 1 tablet (1 tablet Oral Given 2/21/23 1812)   magnesium sulfate 2g in water 50mL IVPB (premix) (0 g Intravenous Stopped 2/21/23 1635)   phenobarbital (LUMINAL) 130 mg in sodium chloride 0.9% 100 mL IVPB (0 mg Intravenous Stopped 2/21/23 1759)   lactated ringers bolus 1,000 mL (0 mLs Intravenous Stopped 2/21/23 1905)   LORazepam injection 2 mg (2 mg Intravenous Given 2/21/23 2132)   Repeat BMP shows close his anion gap, alcohol level is now 168  Patient did require a dose of Ativan 2 mg IV    10:45 PM  I re-evaluated the patient he is able to ambulate in a steady on his feet, no tremors  Nurse reports to me that patient has walk independently to the bathroom several times  We will give him a dose of long-acting Librium and plan for discharge to return to [facility] where he was planning on going through alcohol detox    Disposition: discharge with plan for him to return to [facility]    Patient comfortable with plan for discharge to [facility]. Patient counseled regarding exam, results, diagnosis, treatment, and plan.    Impression: Final diagnoses:  [R00.0] Tachycardia  [F10.920] Alcoholic intoxication without complication (Primary)  [F10.930] Alcohol withdrawal syndrome without complication

## 2023-03-27 ENCOUNTER — HOSPITAL ENCOUNTER (EMERGENCY)
Facility: HOSPITAL | Age: 38
Discharge: PSYCHIATRIC OR SUBSTANCE USE FACILITY | End: 2023-03-28
Attending: EMERGENCY MEDICINE
Payer: MEDICAID

## 2023-03-27 DIAGNOSIS — F10.920 ALCOHOLIC INTOXICATION WITHOUT COMPLICATION: Primary | ICD-10-CM

## 2023-03-27 LAB
ALBUMIN SERPL BCP-MCNC: 4.2 G/DL (ref 3.5–5.2)
ALP SERPL-CCNC: 53 U/L (ref 55–135)
ALT SERPL W/O P-5'-P-CCNC: 41 U/L (ref 10–44)
ANION GAP SERPL CALC-SCNC: 18 MMOL/L (ref 8–16)
AST SERPL-CCNC: 35 U/L (ref 10–40)
BASOPHILS # BLD AUTO: 0.09 K/UL (ref 0–0.2)
BASOPHILS NFR BLD: 0.8 % (ref 0–1.9)
BILIRUB SERPL-MCNC: 0.2 MG/DL (ref 0.1–1)
BUN SERPL-MCNC: 11 MG/DL (ref 6–20)
CALCIUM SERPL-MCNC: 9.1 MG/DL (ref 8.7–10.5)
CHLORIDE SERPL-SCNC: 103 MMOL/L (ref 95–110)
CO2 SERPL-SCNC: 22 MMOL/L (ref 23–29)
CREAT SERPL-MCNC: 1.1 MG/DL (ref 0.5–1.4)
DIFFERENTIAL METHOD: NORMAL
EOSINOPHIL # BLD AUTO: 0.2 K/UL (ref 0–0.5)
EOSINOPHIL NFR BLD: 1.4 % (ref 0–8)
ERYTHROCYTE [DISTWIDTH] IN BLOOD BY AUTOMATED COUNT: 13 % (ref 11.5–14.5)
EST. GFR  (NO RACE VARIABLE): >60 ML/MIN/1.73 M^2
ETHANOL SERPL-MCNC: 379 MG/DL
GLUCOSE SERPL-MCNC: 321 MG/DL (ref 70–110)
HCT VFR BLD AUTO: 47.1 % (ref 40–54)
HGB BLD-MCNC: 16 G/DL (ref 14–18)
IMM GRANULOCYTES # BLD AUTO: 0.03 K/UL (ref 0–0.04)
IMM GRANULOCYTES NFR BLD AUTO: 0.3 % (ref 0–0.5)
LYMPHOCYTES # BLD AUTO: 3.5 K/UL (ref 1–4.8)
LYMPHOCYTES NFR BLD: 31.6 % (ref 18–48)
MCH RBC QN AUTO: 27.8 PG (ref 27–31)
MCHC RBC AUTO-ENTMCNC: 34 G/DL (ref 32–36)
MCV RBC AUTO: 82 FL (ref 82–98)
MONOCYTES # BLD AUTO: 1 K/UL (ref 0.3–1)
MONOCYTES NFR BLD: 8.8 % (ref 4–15)
NEUTROPHILS # BLD AUTO: 6.3 K/UL (ref 1.8–7.7)
NEUTROPHILS NFR BLD: 57.1 % (ref 38–73)
NRBC BLD-RTO: 0 /100 WBC
PLATELET # BLD AUTO: 271 K/UL (ref 150–450)
PMV BLD AUTO: 9.6 FL (ref 9.2–12.9)
POTASSIUM SERPL-SCNC: 3.2 MMOL/L (ref 3.5–5.1)
PROT SERPL-MCNC: 7.2 G/DL (ref 6–8.4)
RBC # BLD AUTO: 5.75 M/UL (ref 4.6–6.2)
SODIUM SERPL-SCNC: 143 MMOL/L (ref 136–145)
T4 FREE SERPL-MCNC: 1.26 NG/DL (ref 0.71–1.51)
TSH SERPL DL<=0.005 MIU/L-ACNC: 0.23 UIU/ML (ref 0.4–4)
WBC # BLD AUTO: 11.06 K/UL (ref 3.9–12.7)

## 2023-03-27 PROCEDURE — 84439 ASSAY OF FREE THYROXINE: CPT | Performed by: EMERGENCY MEDICINE

## 2023-03-27 PROCEDURE — 25000003 PHARM REV CODE 250: Performed by: EMERGENCY MEDICINE

## 2023-03-27 PROCEDURE — 82962 GLUCOSE BLOOD TEST: CPT

## 2023-03-27 PROCEDURE — 82077 ASSAY SPEC XCP UR&BREATH IA: CPT | Performed by: EMERGENCY MEDICINE

## 2023-03-27 PROCEDURE — 80053 COMPREHEN METABOLIC PANEL: CPT | Performed by: EMERGENCY MEDICINE

## 2023-03-27 PROCEDURE — 85025 COMPLETE CBC W/AUTO DIFF WBC: CPT | Performed by: EMERGENCY MEDICINE

## 2023-03-27 PROCEDURE — 84443 ASSAY THYROID STIM HORMONE: CPT | Performed by: EMERGENCY MEDICINE

## 2023-03-27 PROCEDURE — 96361 HYDRATE IV INFUSION ADD-ON: CPT

## 2023-03-27 PROCEDURE — 99284 EMERGENCY DEPT VISIT MOD MDM: CPT | Mod: 25

## 2023-03-27 PROCEDURE — 96374 THER/PROPH/DIAG INJ IV PUSH: CPT

## 2023-03-27 RX ADMIN — SODIUM CHLORIDE 1000 ML: 0.9 INJECTION, SOLUTION INTRAVENOUS at 09:03

## 2023-03-28 VITALS
OXYGEN SATURATION: 98 % | WEIGHT: 200 LBS | RESPIRATION RATE: 18 BRPM | SYSTOLIC BLOOD PRESSURE: 137 MMHG | HEART RATE: 107 BPM | DIASTOLIC BLOOD PRESSURE: 92 MMHG | TEMPERATURE: 98 F | HEIGHT: 71 IN | BODY MASS INDEX: 28 KG/M2

## 2023-03-28 LAB
AMPHET+METHAMPHET UR QL: NEGATIVE
BACTERIA #/AREA URNS HPF: NORMAL /HPF
BARBITURATES UR QL SCN>200 NG/ML: NEGATIVE
BENZODIAZ UR QL SCN>200 NG/ML: NEGATIVE
BILIRUB UR QL STRIP: NEGATIVE
BZE UR QL SCN: NEGATIVE
CANNABINOIDS UR QL SCN: NEGATIVE
CLARITY UR: CLEAR
COLOR UR: COLORLESS
CREAT UR-MCNC: 70 MG/DL (ref 23–375)
ETHANOL SERPL-MCNC: 188 MG/DL
ETHANOL SERPL-MCNC: 250 MG/DL
GLUCOSE UR QL STRIP: ABNORMAL
HGB UR QL STRIP: NEGATIVE
KETONES UR QL STRIP: NEGATIVE
LEUKOCYTE ESTERASE UR QL STRIP: NEGATIVE
METHADONE UR QL SCN>300 NG/ML: NEGATIVE
MICROSCOPIC COMMENT: NORMAL
NITRITE UR QL STRIP: NEGATIVE
OPIATES UR QL SCN: NEGATIVE
PCP UR QL SCN>25 NG/ML: NEGATIVE
PH UR STRIP: 6 [PH] (ref 5–8)
POCT GLUCOSE: 175 MG/DL (ref 70–110)
PROT UR QL STRIP: NEGATIVE
SP GR UR STRIP: >1.03 (ref 1–1.03)
SQUAMOUS #/AREA URNS HPF: 0 /HPF
TOXICOLOGY INFORMATION: NORMAL
URN SPEC COLLECT METH UR: ABNORMAL
UROBILINOGEN UR STRIP-ACNC: NEGATIVE EU/DL
YEAST URNS QL MICRO: NORMAL

## 2023-03-28 PROCEDURE — 82077 ASSAY SPEC XCP UR&BREATH IA: CPT | Mod: 91 | Performed by: EMERGENCY MEDICINE

## 2023-03-28 PROCEDURE — 25000003 PHARM REV CODE 250: Performed by: STUDENT IN AN ORGANIZED HEALTH CARE EDUCATION/TRAINING PROGRAM

## 2023-03-28 PROCEDURE — S4991 NICOTINE PATCH NONLEGEND: HCPCS | Performed by: STUDENT IN AN ORGANIZED HEALTH CARE EDUCATION/TRAINING PROGRAM

## 2023-03-28 PROCEDURE — 25000003 PHARM REV CODE 250: Performed by: EMERGENCY MEDICINE

## 2023-03-28 PROCEDURE — 81000 URINALYSIS NONAUTO W/SCOPE: CPT | Mod: 59 | Performed by: EMERGENCY MEDICINE

## 2023-03-28 PROCEDURE — 80307 DRUG TEST PRSMV CHEM ANLYZR: CPT | Performed by: EMERGENCY MEDICINE

## 2023-03-28 PROCEDURE — 63600175 PHARM REV CODE 636 W HCPCS: Performed by: STUDENT IN AN ORGANIZED HEALTH CARE EDUCATION/TRAINING PROGRAM

## 2023-03-28 PROCEDURE — 82077 ASSAY SPEC XCP UR&BREATH IA: CPT | Performed by: STUDENT IN AN ORGANIZED HEALTH CARE EDUCATION/TRAINING PROGRAM

## 2023-03-28 RX ORDER — LORAZEPAM 2 MG/ML
0.5 INJECTION INTRAMUSCULAR
Status: COMPLETED | OUTPATIENT
Start: 2023-03-28 | End: 2023-03-28

## 2023-03-28 RX ORDER — IBUPROFEN 200 MG
1 TABLET ORAL
Status: DISCONTINUED | OUTPATIENT
Start: 2023-03-28 | End: 2023-03-28 | Stop reason: HOSPADM

## 2023-03-28 RX ADMIN — SODIUM CHLORIDE 1000 ML: 0.9 INJECTION, SOLUTION INTRAVENOUS at 12:03

## 2023-03-28 RX ADMIN — NICOTINE 1 PATCH: 21 PATCH, EXTENDED RELEASE TRANSDERMAL at 04:03

## 2023-03-28 RX ADMIN — LORAZEPAM 0.5 MG: 2 INJECTION INTRAMUSCULAR; INTRAVENOUS at 04:03

## 2023-03-28 NOTE — DISCHARGE INSTRUCTIONS
After a brief and focused history and physical exam, I do not find any outward signs of medical catastophe or unstable condition. This type of evaluation does not include illnesses or conditions that may be latent or chronic in nature. For this type of evaluation, the patient will need good health care follow up with primary care. Patient is medically clear for detox.

## 2023-03-28 NOTE — ED NOTES
Ambulated pt to restroom unable to provide urine sample at this time  Put unsteady on feet, but able to ambulate with assistance

## 2023-03-28 NOTE — PROVIDER PROGRESS NOTES - EMERGENCY DEPT.
Encounter Date: 3/27/2023    ED Physician Progress Notes            2:03 AM  Assumed Care of patient.  Awaiting clinical sobriety prior to being discharged to detox facility    4:58 AM   Patient becoming agitated alcohol level 250 needs to be below 200 prior to acceptance.  Will give point g of Ativan and reassess    6:00 AM  ETOH level of 188. Pt clinically sober and stable to be discharged to detox facility. Pt is currently stable for discharge. I see no indication of an emergent process beyond that addressed during our encounter but have duly counseled the patient/family regarding the need for prompt follow-up as well as the indications that should prompt immediate return to the emergency room should new or worrisome developments occur. The patient/family has been provided with verbal and printed direction regarding our final diagnosis(es) as well as instructions regarding use of OTC and/or Rx medications intended to manage the patient's aforementioned conditions. The patient/family verbalized an understanding. The patient/family is asked if there are any questions or concerns. We discuss the case, until all issues are addressed to the patient/family's satisfaction. Patient/family understands and is agreeable to the plan.    Yolanda Noe MD

## 2023-03-28 NOTE — ED PROVIDER NOTES
"Emergency Department Encounter  Provider Note  Encounter Date: 3/27/2023    Patient Name: Sampson Cook  MRN: [id number]    History of Present Illness   HPI  History of Present Illness:    Chief Complaint:   Chief Complaint   Patient presents with    Alcohol Intoxication     Pt brought via ems for medical clearance for [facility]. Pt states he wants to "sober up."        38-year-old male presenting with request for detox.  Patient states that he presented to [facility] for detox but was too intoxicated and was sent to the hospital for medical clearance.  Patient states that he drank a lot before going to detox.  Difficult to ascertain history except that he wants help.     The following PMH/PSH/SocHx/FamHx has been reviewed by myself:    Past Medical History:   Diagnosis Date    Alcoholism     Diabetes mellitus     Tobacco use      No past surgical history on file.  Social History     Tobacco Use    Smoking status: Every Day     Types: Cigarettes    Smokeless tobacco: Never   Substance Use Topics    Alcohol use: Yes     Comment: "a gallon a day"    Drug use: Not Currently     No family history on file.    Allergies reviewed:  Review of patient's allergies indicates:  No Known Allergies    Medications reviewed:  Discharge Medication List as of 3/28/2023  6:24 AM        CONTINUE these medications which have NOT CHANGED    Details   folic acid (FOLVITE) 1 MG tablet Take 1,000 mcg by mouth once daily., Starting Sat 10/30/2021, Historical Med      ibuprofen (ADVIL,MOTRIN) 200 MG tablet Take 200 mg by mouth every 6 (six) hours as needed for Pain., Historical Med      lisinopriL (PRINIVIL,ZESTRIL) 20 MG tablet Take 1 tablet (20 mg total) by mouth once daily., Starting Tue 2/8/2022, Until Wed 2/8/2023, Normal      metFORMIN (GLUCOPHAGE) 500 MG tablet Take 1,000 mg by mouth 2 (two) times a day., Historical Med      multivitamin Tab Take 1 tablet by mouth once daily., Starting Tue 2/8/2022, Normal      ondansetron " (ZOFRAN-ODT) 4 MG TbDL Take 1 tablet (4 mg total) by mouth 3 (three) times daily., Starting Sat 8/20/2022, Print             ROS  Review of Systems:    Unable to complete due to intoxication    Physical Exam   Physical Exam    Initial Vitals [03/27/23 2049]   BP Pulse Resp Temp SpO2   121/75 (!) 112 20 97.8 °F (36.6 °C) 96 %      MAP       --           Triage vital signs reviewed.    Constitutional: Well-nourished, well-developed. Not in acute distress. Slurring words  HENT: Normocephalic, atraumatic. Moist mucous membranes.  Eyes: No conjunctival injection. Equal pupils.  Resp: Normal respiratory effort, breathing unlabored.  Cardio: Tachycardic rate and rhythm.  GI: Abdomen non-distended.   MSK: Swelling over R 3rd knuckle. Full ROM. No lower extremity edema.  Skin: Warm and dry.   Neuro: Awake and alert. Moves all extremities. No focal deficits. Clinically intoxicated.     ED Course   Procedures    Medical Decision Making    History Acquisition     The history is provided by the patient.     Review of prior external/non ED notes: PCP visit 12/2022    Differential Diagnoses   Based on available information and initial assessment, the working differential diagnoses considered during this evaluation include but are not limited to   CVA/TIA, seizure, status epilepticus, post-ictal state, meningitis/encephalitis, sepsis, MI/ACS, arrhythmia, syncope, intracranial mass/hemorrhage, head trauma, anaphylaxis, substance abuse, alcohol intoxication/withdrawal, medication reaction, intentional medication overdose, neuroleptic malignant syndrome, serotonin syndrome, CO poisoning, hypoxia/hypercapnea, hepatic encephalopathy, metabolic disturbance, thyroid disease, hypoglycemia.  .    EKG       Labs   Lab tests ordered and independently reviewed by me:    Labs Reviewed   COMPREHENSIVE METABOLIC PANEL - Abnormal; Notable for the following components:       Result Value    Potassium 3.2 (*)     CO2 22 (*)     Glucose 321 (*)      Alkaline Phosphatase 53 (*)     Anion Gap 18 (*)     All other components within normal limits   TSH - Abnormal; Notable for the following components:    TSH 0.227 (*)     All other components within normal limits   URINALYSIS, REFLEX TO URINE CULTURE - Abnormal; Notable for the following components:    Color, UA Colorless (*)     Specific Gravity, UA >1.030 (*)     Glucose, UA 4+ (*)     All other components within normal limits    Narrative:     Specimen Source->Urine   ALCOHOL,MEDICAL (ETHANOL) - Abnormal; Notable for the following components:    Alcohol, Serum 379 (*)     All other components within normal limits    Narrative:       alc critical result(s) called and verbal readback obtained from   oh rn by SÁNCHEZ 03/27/2023 22:24   ALCOHOL,MEDICAL (ETHANOL) - Abnormal; Notable for the following components:    Alcohol, Serum 250 (*)     All other components within normal limits   ALCOHOL,MEDICAL (ETHANOL) - Abnormal; Notable for the following components:    Alcohol, Serum 188 (*)     All other components within normal limits   POCT GLUCOSE - Abnormal; Notable for the following components:    POCT Glucose 175 (*)     All other components within normal limits   CBC W/ AUTO DIFFERENTIAL   DRUG SCREEN PANEL, URINE EMERGENCY    Narrative:     Specimen Source->Urine   T4, FREE   URINALYSIS MICROSCOPIC    Narrative:     Specimen Source->Urine         Imaging   Imaging ordered and independently reviewed by me:   Imaging Results    None              Additional Consideration   Sampson Cook  presents to the emergency Department today with alcohol intoxication, requesting detox.  Patient is clinically intoxicated, not in alcohol withdrawal.  Vital signs significant for slight tachycardia.  Will need to sober before medical clearance.  Labs pending.  Will give IV fluids.    Patient is hyperglycemic, will give 2 L of fluid.  Patient is sleeping.  Quite elevated serum alcohol.  Will need to sober and reassess.  No clinical  indication for pec.  Patient is currently sleeping.    Additional testing considered but not indicated during the course of this workup: further imaging and labwork, not indicated  Co-morbidities/chronic illness/exacerbation of chronic illness considered which impacted clinical decision making:  Diabetes, alcohol dependence  Procedures done in the ED or plan for the OR: No  Social determinants of care considered during development of treatment plan include: Decreased medical literacy and Access to healthcare  Discussion of management or test interpretation with external provider: No  DNR discussion: No    The patient's list of active medications and allergies as documented per RN staff has been reviewed.  Medications given in the ED and/or prescribed:   Medications   sodium chloride 0.9% bolus 1,000 mL 1,000 mL (0 mLs Intravenous Stopped 3/27/23 2220)   sodium chloride 0.9% bolus 1,000 mL 1,000 mL (0 mLs Intravenous Stopped 3/28/23 0103)   LORazepam injection 0.5 mg (0.5 mg Intravenous Given 3/28/23 0450)             ED Course as of 03/31/23 0043   Tue Mar 28, 2023   0145 CBC auto differential  Independently interpreted by me, unremarkable    [CS]   0145 Comprehensive metabolic panel(!)  Independently interpreted by me, hyperglycemia [CS]   0146 Ethanol(!!)  Elevated [CS]   0146 TSH(!) [CS]   0146 T4, Free [CS]      ED Course User Index  [CS] Jesusita Myers MD       Explanation of disposition:  Anticipate discharge to [facility].  Disposition pending sobriety.  Sign-out given to oncoming physician at shift change.    Clinical Impression:     1. Alcoholic intoxication without complication        ED Prescriptions    None       The patient's condition does not warrant review of the  and prescription of controlled substances.      ED Disposition Condition    Discharge Stable               Jesusita Myers MD  03/28/23 0148       Jesusita Myers MD  03/30/23 1813       Jesusita Myers MD  03/31/23 0043

## 2023-03-28 NOTE — ED NOTES
PT states he drinks a gallon of liquor a day and has been doing it for a long time  States his last drink was this morning at breakfast  Pt is slurring words and states he is so drunk

## 2023-04-08 ENCOUNTER — OFFICE VISIT (OUTPATIENT)
Dept: URGENT CARE | Facility: CLINIC | Age: 38
End: 2023-04-08
Payer: MEDICAID

## 2023-04-08 VITALS
SYSTOLIC BLOOD PRESSURE: 137 MMHG | WEIGHT: 200 LBS | HEIGHT: 71 IN | TEMPERATURE: 99 F | DIASTOLIC BLOOD PRESSURE: 96 MMHG | RESPIRATION RATE: 18 BRPM | HEART RATE: 103 BPM | OXYGEN SATURATION: 99 % | BODY MASS INDEX: 28 KG/M2

## 2023-04-08 DIAGNOSIS — R11.2 NAUSEA AND VOMITING, UNSPECIFIED VOMITING TYPE: Primary | ICD-10-CM

## 2023-04-08 LAB
BILIRUB UR QL STRIP: NEGATIVE
GLUCOSE UR QL STRIP: POSITIVE
KETONES UR QL STRIP: NEGATIVE
LEUKOCYTE ESTERASE UR QL STRIP: NEGATIVE
PH, POC UA: 7.5
POC BLOOD, URINE: NEGATIVE
POC NITRATES, URINE: NEGATIVE
PROT UR QL STRIP: NEGATIVE
SP GR UR STRIP: 1.01 (ref 1–1.03)
UROBILINOGEN UR STRIP-ACNC: NORMAL (ref 0.3–2.2)

## 2023-04-08 PROCEDURE — 99203 PR OFFICE/OUTPT VISIT, NEW, LEVL III, 30-44 MIN: ICD-10-PCS | Mod: S$GLB,,, | Performed by: FAMILY MEDICINE

## 2023-04-08 PROCEDURE — 81003 URINALYSIS AUTO W/O SCOPE: CPT | Mod: QW,S$GLB,, | Performed by: FAMILY MEDICINE

## 2023-04-08 PROCEDURE — 99203 OFFICE O/P NEW LOW 30 MIN: CPT | Mod: S$GLB,,, | Performed by: FAMILY MEDICINE

## 2023-04-08 PROCEDURE — 81003 POCT URINALYSIS, DIPSTICK, AUTOMATED, W/O SCOPE: ICD-10-PCS | Mod: QW,S$GLB,, | Performed by: FAMILY MEDICINE

## 2023-04-08 RX ORDER — HYDROXYZINE PAMOATE 25 MG/1
25 CAPSULE ORAL 4 TIMES DAILY
Qty: 30 CAPSULE | Refills: 0 | Status: SHIPPED | OUTPATIENT
Start: 2023-04-08

## 2023-04-08 RX ORDER — HYDROXYZINE PAMOATE 50 MG/1
100 CAPSULE ORAL 2 TIMES DAILY
COMMUNITY
Start: 2023-02-27

## 2023-04-08 RX ORDER — QUETIAPINE FUMARATE 100 MG/1
100 TABLET, FILM COATED ORAL NIGHTLY
COMMUNITY
Start: 2022-11-29

## 2023-04-08 RX ORDER — GABAPENTIN 100 MG/1
100 CAPSULE ORAL 3 TIMES DAILY
COMMUNITY

## 2023-04-08 RX ORDER — ONDANSETRON 4 MG/1
4 TABLET, ORALLY DISINTEGRATING ORAL EVERY 6 HOURS PRN
Qty: 15 TABLET | Refills: 0 | Status: SHIPPED | OUTPATIENT
Start: 2023-04-08

## 2023-04-08 RX ORDER — PANTOPRAZOLE SODIUM 40 MG/1
40 TABLET, DELAYED RELEASE ORAL DAILY
Qty: 30 TABLET | Refills: 1 | Status: SHIPPED | OUTPATIENT
Start: 2023-04-08 | End: 2024-04-07

## 2023-04-08 RX ORDER — SODIUM CHLORIDE, SODIUM LACTATE, POTASSIUM CHLORIDE, CALCIUM CHLORIDE 600; 310; 30; 20 MG/100ML; MG/100ML; MG/100ML; MG/100ML
INJECTION, SOLUTION INTRAVENOUS
Status: DISCONTINUED | OUTPATIENT
Start: 2023-04-08 | End: 2023-04-08

## 2023-04-08 RX ORDER — ONDANSETRON 2 MG/ML
4 INJECTION INTRAMUSCULAR; INTRAVENOUS
Status: COMPLETED | OUTPATIENT
Start: 2023-04-08 | End: 2023-04-08

## 2023-04-08 RX ADMIN — ONDANSETRON 4 MG: 2 INJECTION INTRAMUSCULAR; INTRAVENOUS at 11:04

## 2023-04-08 NOTE — PROGRESS NOTES
"Subjective:      Patient ID: Sampson Cook is a 38 y.o. male.    Vitals:  height is 5' 11" (1.803 m) and weight is 90.7 kg (200 lb). His temperature is 98.5 °F (36.9 °C). His blood pressure is 137/96 (abnormal) and his pulse is 103. His respiration is 18 and oxygen saturation is 99%.     Chief Complaint: Emesis    38 year old male presents today with HA, emesis, nausea, lack of energy, fatigue, lightheaded, dizziness. States abnormal urine output.  No known exposure to anything. Never had COVID before that he knows of. COVID vaccinated. Symptoms started last night. Treatments at home include nothing. Denies eating anything differently. Drank last night may be dehydrated. States he doesn't feel like he is hung over.   Pt was tx with levatrol 5 days ago, and was discharged from [facility],  Yesterday drank 4 pints of alcohol, since he broke up with his GF, Having severe nausea and vomitted since last night multiple time  Pt denies having bad ideation      Emesis   This is a new problem. The current episode started yesterday. The problem occurs 5 to 10 times per day. The problem has been gradually worsening. There has been no fever. Associated symptoms include abdominal pain, dizziness and sweats. Pertinent negatives include no arthralgias, chest pain, chills, coughing, decreased urine volume, diarrhea, fever, headaches, myalgias, URI or weight loss. He has tried nothing for the symptoms.     Constitution: Negative for chills and fever.   Cardiovascular:  Negative for chest pain.   Respiratory:  Negative for cough.    Gastrointestinal:  Positive for abdominal pain and vomiting. Negative for diarrhea.   Genitourinary:  Negative for urine decreased.   Musculoskeletal:  Negative for joint pain and muscle ache.   Neurological:  Positive for dizziness. Negative for headaches.    Objective:     Physical Exam   Constitutional: He is oriented to person, place, and time. He appears well-developed. He is cooperative.      " Comments:No tremors noted  Pt is alert, oriented x 3  No alcohol on breath       HENT:   Head: Normocephalic and atraumatic.   Ears:   Right Ear: Hearing, tympanic membrane, external ear and ear canal normal.   Left Ear: Hearing, tympanic membrane, external ear and ear canal normal.   Nose: Nose normal. No mucosal edema or nasal deformity. No epistaxis. Right sinus exhibits no maxillary sinus tenderness and no frontal sinus tenderness. Left sinus exhibits no maxillary sinus tenderness and no frontal sinus tenderness.   Mouth/Throat: Uvula is midline, oropharynx is clear and moist and mucous membranes are normal. Mucous membranes are moist. No trismus in the jaw. Normal dentition. No uvula swelling. Oropharynx is clear.   Eyes: Conjunctivae and lids are normal. Pupils are equal, round, and reactive to light. Extraocular movement intact   Neck: Trachea normal and phonation normal. Neck supple.   Cardiovascular: Normal rate, regular rhythm, normal heart sounds and normal pulses.   Pulmonary/Chest: Effort normal and breath sounds normal.   Abdominal: Normal appearance and bowel sounds are normal. He exhibits no distension and no mass. Soft. There is no abdominal tenderness. There is no rebound, no guarding, no left CVA tenderness and no right CVA tenderness. No hernia.   Musculoskeletal: Normal range of motion.         General: Normal range of motion.   Neurological: He is alert and oriented to person, place, and time. He exhibits normal muscle tone.   Skin: Skin is warm, dry and intact.   Psychiatric: His speech is normal and behavior is normal. Judgment and thought content normal.   Nursing note and vitals reviewed.    Assessment:     1. Nausea and vomiting, unspecified vomiting type        Plan:       Nausea and vomiting, unspecified vomiting type  -     POCT Urinalysis, Dipstick, Automated, W/O Scope    Other orders  -     ondansetron injection 4 mg  -     ondansetron (ZOFRAN-ODT) 4 MG TbDL; Take 1 tablet (4 mg  total) by mouth every 6 (six) hours as needed.  Dispense: 15 tablet; Refill: 0            Results for orders placed or performed in visit on 04/08/23   POCT Urinalysis, Dipstick, Automated, W/O Scope   Result Value Ref Range    POC Blood, Urine Negative Negative    POC Bilirubin, Urine Negative Negative    POC Urobilinogen, Urine Normal 0.3 - 2.2    POC Ketones, Urine Negative Negative    POC Protein, Urine Negative Negative    POC Nitrates, Urine Negative Negative    POC Glucose, Urine Positive (A) Negative    pH, UA 7.5     POC Specific Gravity, Urine 1.015 1.003 - 1.029    POC Leukocytes, Urine Negative Negative        Advised to refreain frm drinking    FU at [facility]         Pt refused IVF

## 2023-04-09 ENCOUNTER — HOSPITAL ENCOUNTER (EMERGENCY)
Facility: HOSPITAL | Age: 38
Discharge: HOME | End: 2023-04-09
Attending: EMERGENCY MEDICINE
Payer: MEDICAID

## 2023-04-09 ENCOUNTER — TELEPHONE (OUTPATIENT)
Dept: URGENT CARE | Facility: CLINIC | Age: 38
End: 2023-04-09
Payer: MEDICAID

## 2023-04-09 VITALS
RESPIRATION RATE: 18 BRPM | WEIGHT: 185 LBS | SYSTOLIC BLOOD PRESSURE: 124 MMHG | TEMPERATURE: 99 F | HEART RATE: 97 BPM | DIASTOLIC BLOOD PRESSURE: 86 MMHG | BODY MASS INDEX: 25.8 KG/M2 | OXYGEN SATURATION: 97 %

## 2023-04-09 DIAGNOSIS — F10.10 ETOH ABUSE: ICD-10-CM

## 2023-04-09 DIAGNOSIS — F10.920 ALCOHOLIC INTOXICATION WITHOUT COMPLICATION: Primary | ICD-10-CM

## 2023-04-09 PROCEDURE — 99281 EMR DPT VST MAYX REQ PHY/QHP: CPT

## 2023-04-09 NOTE — ED PROVIDER NOTES
"Emergency Department Encounter  Provider Note    Sampson Cook  [id number]  4/9/2023    Evaluation:    History:     Chief Complaint   Patient presents with    Alcohol Intoxication     Here because he wants to get check in for rehab. +ETOH. Last drink this morning      Sampson Cook is a 38 y.o. male who has a past medical history of Alcoholism, Diabetes mellitus, and Tobacco use.    The patient presents to the ED due to alcohol problem.   Patient states he wants to go to rehab for alcohol use.  He states he just got out of rehab a few days ago, but admits he "messed up."  He denies any other complaints currently. He admits to drinking alcohol a few hours ago.   He later admits he was kicked out of his "sober living house" and had to sleep outside last night.         Past Medical History:   Diagnosis Date    Alcoholism     Diabetes mellitus     Tobacco use      No past surgical history on file.  No family history on file.  Social History     Socioeconomic History    Marital status: Single   Tobacco Use    Smoking status: Every Day     Types: Cigarettes    Smokeless tobacco: Never   Substance and Sexual Activity    Alcohol use: Yes     Comment: "a gallon a day"    Drug use: Not Currently    Sexual activity: Yes     Partners: Female     Social Determinants of Health     Financial Resource Strain: High Risk    Difficulty of Paying Living Expenses: Very hard   Food Insecurity: Food Insecurity Present    Worried About Running Out of Food in the Last Year: Often true    Ran Out of Food in the Last Year: Often true   Transportation Needs: No Transportation Needs    Lack of Transportation (Medical): No    Lack of Transportation (Non-Medical): No   Physical Activity: Inactive    Days of Exercise per Week: 0 days    Minutes of Exercise per Session: 0 min   Stress: No Stress Concern Present    Feeling of Stress : Not at all   Social Connections: Moderately Isolated    Frequency of Communication with Friends and Family: More " than three times a week    Frequency of Social Gatherings with Friends and Family: More than three times a week    Attends Hinduism Services: More than 4 times per year    Active Member of Clubs or Organizations: No    Attends Club or Organization Meetings: Never    Marital Status: Never    Housing Stability: High Risk    Unable to Pay for Housing in the Last Year: Yes    Number of Places Lived in the Last Year: 1    Unstable Housing in the Last Year: No     Review of patient's allergies indicates:  No Known Allergies    Review of Systems   Constitutional:  Negative for fever.   Respiratory:  Negative for shortness of breath.    Cardiovascular:  Negative for chest pain.   Gastrointestinal:  Negative for abdominal pain, diarrhea, nausea and vomiting.     Physical Exam:     Initial Vitals [04/09/23 1318]   BP Pulse Resp Temp SpO2   (!) 151/94 102 20 98.9 °F (37.2 °C) 98 %      MAP       --         Physical Exam    Nursing note and vitals reviewed.  Constitutional: He appears well-developed and well-nourished. He is not diaphoretic. No distress.   Appears somewhat intoxicated, holding normal conversation but with slightly slurred speech. Linear and cohesive thoughts and responses.    HENT:   Head: Normocephalic and atraumatic.   Mouth/Throat: Oropharynx is clear and moist.   Eyes: EOM are normal. Pupils are equal, round, and reactive to light.   Neck: No tracheal deviation present.   Cardiovascular:  Normal rate, regular rhythm, normal heart sounds and intact distal pulses.           Pulmonary/Chest: Breath sounds normal. No stridor. No respiratory distress.   Abdominal: Abdomen is soft. He exhibits no distension and no mass. There is no abdominal tenderness.   Musculoskeletal:         General: No edema. Normal range of motion.     Neurological: He is alert and oriented to person, place, and time. No cranial nerve deficit or sensory deficit.   Skin: Skin is warm and dry. Capillary refill takes less than 2  seconds. No rash noted.   Multiple tattoos.    Psychiatric: He has a normal mood and affect. His behavior is normal. Thought content normal.       ED Course:   Procedures    Medical Decision Making:    History Acquisition:   Additional historians utilized:  none    Prior medical records were reviewed:    visit 4/8 for N/V.   Seen in ED 5 times in last 2 months for alcohol intoxication. Admitted to [facility] 3/28.   Surgery visit 2/15 for inguinal hernia    The patient's list of active medical problems, social history, medications, and allergies as documented has been reviewed.     Differential Diagnoses:   Based on available information and initial assessment, Differential Diagnosis includes, but is not limited to:  Decompensated psychiatric disease (schizophrenia, bipolar disorder, major depression), excited delirium, medication noncompliance, substance abuse/withdrawal, intentional drug overdose, medication toxicity, APAP/ASA overdose, acute stress reaction, personality disorder, malingering, metabolic derangement        EKG:       Labs:   Labs Reviewed - No data to display  Independent review of the labs ordered include:   none    Imaging:     Imaging Results    None            Additional Consideration:   Additional testing considered during clinical course: labs/imaging considered, but not indicated secondary to uncomplicated alcohol intoxication    Social determinants of health considered during development of treatment plan include: poor access to care, unhoused, alcohol abuse, tobacco abuse    Current co-morbidities considered which impacted clinical decision making: HTN, DM    Case discussed with additional provider: none    Medications - No data to display          Medical Decision Making:   Initial Assessment:   37 yo M with chronic alcohol abuse history presents with alcohol intoxication.   States he wants to go to rehab.  Vitals and exam benign.  Will allow to sober and reassess.   ED  Management:  Patient clinically sober on reassessment.   Informed patient we do not admit for rehab or arrange for voluntary alcohol rehab.   He was given outpatient drug/alcohol resources for management if he desires.  Stable for D/C.    On re-evaluation, the patient's status has improved.  After complete ED evaluation, clinical impression is most consistent with alcohol intoxication.  PCP follow-up within 2-3 days was recommended.    After taking into careful account the patient's history, physical exam findings, as well as empirical and objective data obtained throughout ED workup, I feel no emergent medical condition has been identified. No further evaluation or admission was felt to be required, and the patient is stable for discharge from the ED. The patient and any additional family present were updated with test results, overall clinical impression, and recommended further plan of care, including discharge instructions as provided and outpatient follow-up for continued evaluation and management as needed. All questions were answered. The patient expressed understanding and agreed with current plan for discharge and follow-up plan of care. Strict ED return precautions were provided, including return/worsening of current symptoms, new symptoms, or any other concerns.                 Clinical Impression:       ICD-10-CM ICD-9-CM   1. Alcoholic intoxication without complication  F10.920 305.00   2. ETOH abuse  F10.10 305.00            ED Disposition Condition    Discharge Stable               Mk Montano MD  04/10/23 2111

## 2023-04-09 NOTE — DISCHARGE INSTRUCTIONS
We provided you with a list of outpatient alcohol abuse resources.   Contact a rehab center if you would like inpatient treatment to stop drinking.       Thank you for choosing [facility]!     Our goal in the Emergency Department is to always provide outstanding medical care. You may receive a survey by mail or e-mail in the next week regarding your experience today. We would greatly appreciate you completing and returning the survey. Your feedback provides us with a way to recognize our staff who provide very good care, and it helps us learn how to improve when your experience was below our aspiration of excellence.      It is important to remember that some problems are difficult to diagnose and may not be found during your first visit. Be sure to follow up with your primary care doctor and review any labs/imaging that was performed during your visit with them. If you do not have a primary care doctor, you may contact the one listed on your discharge paperwork, or you may also call the [facility] Appointment Desk at [phone] to schedule an appointment.     All medications may potentially have side effects and it is impossible to predict which medications may give you side effects. If you feel that you are having a negative effect of any medication you should immediately stop taking them and seek medical attention.  Do not drive or make any important decisions for 24 hours if you have received any pain medications, sedatives or mood altering drugs during your ER visit.    We appreciate you trusting us with your medical care. We will be happy to take care of you for all of your future medical needs. You may return to the ER at any time for any new/concerning symptoms, worsening condition, or failure to improve. We hope you feel better soon.     Sincerely,    Mk Montano Jr., MD  Board-Certified Emergency Medicine Physician  [facility]

## 2023-04-09 NOTE — ED TRIAGE NOTES
Pt presents to the ED to get into rehab. Pt stats that he would like to stop drinking. Last drink was this morning.       Patient identifiers verified and correct.     APPEARANCE: Patient not in acute distress. Pt is intoxicated.  NEURO: Awake, alert, appropriate for age, condition, and situation, pupils equal, round, and reactive.   HEENT: Head symmetrical. Eyes bilateral.  Bilateral ears without drainage. Bilateral nares patent, throat clear.  CARDIAC: Regular rate and rhythm  RESPIRATORY: Airway is open and patent. Respirations are normal and spontaneous on room air.   GI/: Abdomen soft and non-distended. .   NEUROVASCULAR: All extremities are warm and pink. .  MUSCULOSKELETAL: Moves all extremities.   SKIN: Warm and dry, adequate turgor, mucus membranes moist and pink; no breakdown, lesions, or ecchymosis noted.     Will continue to monitor.

## 2023-04-10 PROCEDURE — 99284 EMERGENCY DEPT VISIT MOD MDM: CPT | Mod: 25

## 2023-04-11 ENCOUNTER — HOSPITAL ENCOUNTER (EMERGENCY)
Facility: HOSPITAL | Age: 38
Discharge: HOME | End: 2023-04-11
Attending: EMERGENCY MEDICINE
Payer: MEDICAID

## 2023-04-11 VITALS
DIASTOLIC BLOOD PRESSURE: 79 MMHG | OXYGEN SATURATION: 95 % | SYSTOLIC BLOOD PRESSURE: 168 MMHG | RESPIRATION RATE: 20 BRPM | TEMPERATURE: 98 F | HEIGHT: 71 IN | WEIGHT: 200 LBS | BODY MASS INDEX: 28 KG/M2 | HEART RATE: 107 BPM

## 2023-04-11 DIAGNOSIS — F10.920 ALCOHOLIC INTOXICATION WITHOUT COMPLICATION: Primary | ICD-10-CM

## 2023-04-11 LAB — ETHANOL SERPL-MCNC: 193 MG/DL

## 2023-04-11 PROCEDURE — 25000003 PHARM REV CODE 250: Performed by: EMERGENCY MEDICINE

## 2023-04-11 PROCEDURE — 96360 HYDRATION IV INFUSION INIT: CPT

## 2023-04-11 PROCEDURE — 82077 ASSAY SPEC XCP UR&BREATH IA: CPT | Performed by: EMERGENCY MEDICINE

## 2023-04-11 RX ADMIN — SODIUM CHLORIDE 1000 ML: 0.9 INJECTION, SOLUTION INTRAVENOUS at 02:04

## 2023-04-11 NOTE — ED PROVIDER NOTES
"Encounter Date: 4/10/2023       History     Chief Complaint   Patient presents with    Generalized Body Aches     C/o body pain "for a long time" rated 10 out of 10. + ETOH. Pt falling asleep during triage. Reports drinking "a lot" of bourbon daily. Unsure when he had last drink, states it was some time today.     HPI    This is a 38-year-old male history of alcohol abuse, frequent ER visits to alcohol abuse presents the ER for evaluation alcohol intoxication general malaise.  Patient endorses that he drank too much.  He reports that he wants to go to rehab and is waiting for [facility] to call him in the morning.  Patient continuing endorses that he drank too much .    Review of patient's allergies indicates:  No Known Allergies  Past Medical History:   Diagnosis Date    Alcoholism     Diabetes mellitus     Tobacco use      No past surgical history on file.  No family history on file.  Social History     Tobacco Use    Smoking status: Every Day     Types: Cigarettes    Smokeless tobacco: Never   Substance Use Topics    Alcohol use: Yes     Comment: "a gallon a day"    Drug use: Not Currently     Review of Systems   Constitutional:  Positive for fatigue.   All other systems reviewed and are negative.    Physical Exam     Initial Vitals [04/10/23 2357]   BP Pulse Resp Temp SpO2   (!) 155/93 (!) 121 20 98.1 °F (36.7 °C) (!) 94 %      MAP       --         Physical Exam    Nursing note and vitals reviewed.  Constitutional:   Elderly chronically ill-appearing no acute distress   HENT:   Head: Normocephalic and atraumatic.   Eyes: Pupils are equal, round, and reactive to light.   Neck:   Normal range of motion.  Cardiovascular:            Tachycardic rate and rhythm   Pulmonary/Chest: No respiratory distress.   Abdominal: Abdomen is soft. He exhibits no distension. There is no abdominal tenderness.   Musculoskeletal:         General: Normal range of motion.      Cervical back: Normal range of motion. " "    Neurological: He is alert.   Skin: Skin is warm and dry. Capillary refill takes less than 2 seconds.       ED Course   Procedures  Labs Reviewed   ALCOHOL,MEDICAL (ETHANOL) - Abnormal; Notable for the following components:       Result Value    Alcohol, Serum 193 (*)     All other components within normal limits          Imaging Results    None          Medications   sodium chloride 0.9% bolus 1,000 mL 1,000 mL (0 mLs Intravenous Stopped 4/11/23 0312)     Medical Decision Making:   Initial Assessment:   38-year-old male history of alcohol abuse presents the ER for evaluation of "drinking too much".  Complaining of malaise and fatigue.  Reports he wants to go to rehab and waiting for him to calm today.  I asked him how I could help him today, he reports that he "drank too much".  He has no chest pain shortness of breath he is sleeping comfortably in bed, easily awakened arousable, not aggressive or violent neurologically intact.  Likely alcohol intoxication.  Will plan symptomatic support observation and reassess.                        Clinical Impression:   Final diagnoses:  [F10.920] Alcoholic intoxication without complication (Primary)               Toño Chester MD  04/11/23 0557    "

## 2023-04-11 NOTE — PROVIDER PROGRESS NOTES - EMERGENCY DEPT.
Encounter Date: 4/10/2023    ED Physician Progress Notes        Physician Note:   This is an assumption of care note.     Upon shift change, the patient was transferred to me from Dr. Chester @ 5:59 AM in stable condition.     Patient presented to ED with chief complaint of plan at shift change was  to reassess patient pending sobriety. He has a long standing history of alcohol intoxication/ dependency.       Update:   No acute events during shift. Patient is alert, PO tolerant and in NAD. He is ambulatory with a steady gait and clinically sober.  No signs of alcohol withdrawal. He is stable for discharge today. Patient states he is interested in alcohol treatment, given resources. Return precautions discussed.  After taking into careful account the historical factors and physical exam findings of the patient's presentation today, in conjunction with the empirical and objective data obtained on ED workup, no acute emergent medical condition has been identified. The patient appears to be low risk for an emergent medical condition and I feel it is safe and appropriate at this time for the patient to be discharged to follow-up as detailed in their discharge instructions for reevaluation and possible continued outpatient workup and management. I have discussed the specifics of the workup with the patient and the patient has verbalized understanding of the details of the workup, the diagnosis, the treatment plan, and the need for outpatient follow-up.  Although the patient has no emergent etiology today this does not preclude the development of an emergent condition so in addition, I have advised the patient that they can return to the ED and/or activate EMS at any time with worsening of their symptoms, change of their symptoms, or with any other medical complaint.  The patient remained comfortable and stable during their visit in the ED.  Discharge and follow-up instructions discussed with the patient who expressed  understanding and willingness to comply with my recommendations.  LABS:  Labs Reviewed  ALCOHOL,MEDICAL (ETHANOL) - Abnormal; Notable for the following components:     Alcohol, Serum                193 (*)             All other components within normal limits      MEDICATIONS:  Medications  sodium chloride 0.9% bolus 1,000 mL 1,000 mL (0 mLs Intravenous Stopped 4/11/23 0312)      IMPRESSION:  Alcoholic intoxication without complication  (primary encounter diagnosis)           DISPOSITION:  Discharge

## 2023-04-11 NOTE — DISCHARGE INSTRUCTIONS

## 2023-04-11 NOTE — ED TRIAGE NOTES
"The patient complains of generalized body aches. He reports heavy ETOH use. He states, "I just don't feel well". He was seen in the ED last night. He is awaiting admission to rehab.    Adult Physical Assessment  LOC: Sampson Cook, 38 y.o. male verified via two identifiers.  The patient is awake, alert, oriented and speaking appropriately at this time.  APPEARANCE: Patient resting comfortably and appears to be in no acute distress at this time. Patient is clean and well groomed, patient's clothing is properly fastened.  SKIN:The skin is warm and dry, color consistent with ethnicity, patient has normal skin turgor and moist mucus membranes, skin intact, no breakdown or brusing noted.  MUSCULOSKELETAL: Patient moving all extremities well, no obvious swelling or deformities noted.  RESPIRATORY: Airway is open and patent, respirations are spontaneous, patient has a normal effort and rate, no accessory muscle use noted.  CARDIAC: Patient has a normal rate and rhythm, no periphreal edema noted in any extremity, capillary refill < 3 seconds in all extremities  ABDOMEN: Soft and non tender to palpation, no abdominal distention noted. Bowel sounds present in all four quadrants.  NEUROLOGIC: Eyes open spontaneously, behavior appropriate to situation, follows commands, facial expression symmetrical, bilateral hand grasp equal and even, purposeful motor response noted, normal sensation in all extremities when touched with a finger. Moderate tremor to bilateral upper extremities.    "